# Patient Record
Sex: MALE | Race: BLACK OR AFRICAN AMERICAN | Employment: UNEMPLOYED | URBAN - METROPOLITAN AREA
[De-identification: names, ages, dates, MRNs, and addresses within clinical notes are randomized per-mention and may not be internally consistent; named-entity substitution may affect disease eponyms.]

---

## 2018-10-18 ENCOUNTER — HOSPITAL ENCOUNTER (OUTPATIENT)
Dept: WOUND CARE | Age: 43
Discharge: HOME | End: 2018-10-18
Payer: MEDICARE

## 2018-10-18 VITALS
SYSTOLIC BLOOD PRESSURE: 152 MMHG | HEART RATE: 98 BPM | TEMPERATURE: 97.1 F | OXYGEN SATURATION: 100 % | DIASTOLIC BLOOD PRESSURE: 101 MMHG | RESPIRATION RATE: 18 BRPM

## 2018-10-18 PROBLEM — L97.513 RIGHT FOOT ULCER, WITH NECROSIS OF MUSCLE (HCC): Status: ACTIVE | Noted: 2018-10-18

## 2018-10-18 PROBLEM — E11.42 DIABETIC POLYNEUROPATHY (HCC): Status: ACTIVE | Noted: 2018-10-18

## 2018-10-18 PROBLEM — L97.512 RIGHT FOOT ULCER, WITH FAT LAYER EXPOSED (HCC): Status: ACTIVE | Noted: 2018-10-18

## 2018-10-18 PROCEDURE — 77030011255 HC DSG AQUACEL AG BMS -A: Performed by: PODIATRIST

## 2018-10-18 PROCEDURE — 11042 DBRDMT SUBQ TIS 1ST 20SQCM/<: CPT

## 2018-10-18 PROCEDURE — 77030011256 HC DRSG MEPILEX <16IN NO BORD MOLN -A: Performed by: PODIATRIST

## 2018-10-18 RX ORDER — GABAPENTIN 300 MG/1
300 CAPSULE ORAL 3 TIMES DAILY
COMMUNITY
End: 2019-06-28

## 2018-10-18 NOTE — PROGRESS NOTES
Podiatry Consultation Note    Assessment:       Patient Active Problem List   Diagnosis Code    Right foot ulcer, with necrosis of muscle ([address] L97.513    Diabetic polyneuropathy ([address] E11.42    Right foot ulcer, with fat layer exposed ([address] L97.512       Plan:     Must return to the surgical shoe given Monday to help offload the ulcer lesions. Complete Doxycycline as instructed. Selective excisional debridement with LWC with Aquacel AG and mepilex. Subjective:     I was asked to evaluate [facility] for for diabetic ulcer right foot. He was seen at [facility] yesterday and impressions where taken for diabetic shoes with inserts. He  is a 39 y.o. male admitted on 10/18/2018 for wound care. Allergies   Allergen Reactions    Contrast Agent [Iodine] Rash       Current Outpatient Medications   Medication Sig    gabapentin (NEURONTIN) 300 mg capsule Take 300 mg by mouth three (3) times daily.  amoxicillin/potassium clav (AMOXICILLIN-POT CLAVULANATE PO) Take 1 Tab by mouth two (2) times a day.  insulin aspart (NOVOLOG) 100 unit/mL injection by SubCUTAneous route. Indications: TYPE 2 DIABETES MELLITUS    simvastatin (ZOCOR) 20 mg tablet Take 40 mg by mouth nightly.  aspirin 81 mg chewable tablet Take 81 mg by mouth daily. Indications: MYOCARDIAL INFARCTION PREVENTION     No current facility-administered medications for this encounter. Past Medical History:   Diagnosis Date    Diabetes [facility])      Past Surgical History:   Procedure Laterality Date    HX GI      NEUROLOGICAL PROCEDURE UNLISTED       No family history on file.   Social History     Socioeconomic History    Marital status: SINGLE     Spouse name: Not on file    Number of children: Not on file    Years of education: Not on file    Highest education level: Not on file   Social Needs    Financial resource strain: Not on file    Food insecurity - worry: Not on file    Food insecurity - inability: Not on file   [employer] needs - medical: Not on file   [employer] needs - non-medical: Not on file   Occupational History    Not on file   Tobacco Use    Smoking status: Current Every Day Smoker     Packs/day: 0.25    Smokeless tobacco: Never Used   Substance and Sexual Activity    Alcohol use:  Yes     Alcohol/week: 3.0 oz     Types: 1 Shots of liquor, 4 Glasses of wine per week     Comment: socially    Drug use: Yes     Frequency: 7.0 times per week     Types: Marijuana    Sexual activity: Not on file   Other Topics Concern     Service Not Asked    Blood Transfusions Not Asked    Caffeine Concern Not Asked    Occupational Exposure Not Asked    Hobby Hazards Not Asked    Sleep Concern Not Asked    Stress Concern Not Asked    Weight Concern Not Asked    Special Diet Not Asked    Back Care Not Asked    Exercise Not Asked    Bike Helmet Not Asked   [address] Not Asked    Self-Exams Not Asked   Social History Narrative    Not on file       Physical Exam:  GENERAL: alert, cooperative, no distress, appears stated age    REVIEW OF SYSTEMS:  General: denies chronic fatigue, weight loss, fever, anemia, bruising, depression, nervousness, panic attacks  HEENT: denies ringing in ears, ear infections, dizzy spells, poor vision, glaucoma, sinus trouble, hoarseness, eye infections  GI: denies diarrhea, gas, bloating, heartburn, regurgitation, difficulty swallowing, painful swallowing, nausea, vomiting, constipation, abdominal pain, decreased appetite, blood in stools, black stools, jaundice, dark urine  Lungs: denies pneumonia, asthma, cough, SOB, hemoptysis  Heart: denies chest pain, irregular heart beat, ankle swelling, HTN  Skin: denies rashes, hives, allergic reaction  Urinary: denies UTI, kidney stones, decreased urine force and flow, urination at night, blood in urine, painful urination  Bones and Joints: denies arthritis, rheumatism, back pain, gout, osteoporosis  Neurologic: denies stroke, seizures, headaches, numbness, tingling      Vitals:    10/18/18 1404   BP: (!) 152/101   Pulse: 98   Resp: 18   Temp: 97.1 °F (36.2 °C)   SpO2: 100%       OBJECTIVE:    Patient is alert, well developed, cooperative, pleasant and in no apparent distress. Lower Extremity Exam:     PHYSICAL EXAM: Patient is 39year old male. Pleasant, alert and oriented x3, in no apparent distress, and looks his/her given age. Patient is well-developed and nourished, with good attention to hygiene and body habitus. Mood and affect normal, appropriate to situation. VASCULAR EXAM:. . Pedal pulses are palpable 2/4 DP 2/4 PT right and left foot. Skin temperature is warm to cool right and left foot. Digital capillary fill time is 3 seconds right and left foot. There is no edema of the right and left foot and ankle. NEUROLOGICAL EXAM:. . Sensation is decrease to the right and left foot. . Protective sensation decrease with 5.07g wire Bilateral. Deep tendon reflexes are intact and symmetrical on the right and left foot. MUSCULOSKELETAL EXAM:. . Muscle tone is normal.There is no equinus of the right and left limb. The muscle strength is 5/5 for the flexors, extensors, inverters, and everters. Natalia Figueredo MYCOTIC NAILS:. . Mycosis: Partial (<50%) Bilateral 1 2 3 4 5. Shape: Dystrophic Bilateral 1 2 3 4 5 Color: Yellow Bilateral 1 2 3 4 5. DERMATOLOGICAL EXAM:. . Skin is of abnormal texture and turgor with atrophic skin changes noting decreased hair growth, nail changes (thickening), pigmentary changes, skin texture (thin), Bilateral. No asc lymphagitis noted right  Subhyperkeratotic lesion with tissue breakdown. Noted Grade 2 ulcer. 1.0 centimeters x 0.9 centimeters x 0.5 centimeters with necrotic, nonviable tissue there is no probing to the capsule sub met 5 right. . There is serous exudate with odor noted upon debridement. Subhyperkeratotic lesion with tissue breakdown. Noted Grade 2 ulcer.  1.0 centimeters x 0.5 centimeters x 0.1 centimeters with necrotic, nonviable tissue sub met 1 right. . There is serous exudate noted. No results found for this or any previous visit (from the past 24 hour(s)). Imaging: deferred        EXCISIONAL DEBRIDEMENT NOTE    Selective sharp instrument debridement of slough and devitilized tissue    After the benefits/risks/SE were discussed, the patient agreed to proceed. Time out was done:   * Patient was identified by name and date of birth   * Agreement on procedure being performed was verified   * Procedure site verified and marked as necessary   * Patient was positioned for comfort   * Consent was signed and verified. Site: sub met 5 and sub met 1 right foot    Instruments used:    []  Dermal curette  [x] Blade        [] #15  [x] #10  [] Forceps  [] Tissue nippers  [] sterile scissors  [] Other     Anesthesia:    []  EMLA 2.5% cream: applied to wound beds for approximately 15minutes. []   Lidocaine 2% Topical Gel      []  Lidocaine injectable 1% with epinephrine 1:100,000    []  Lidocaine injectable 1% without epinephrine    []  Other:     [x]  None         [x] patient is insensate due to neuropathy         [] patient declines        [] allergy to anesthetic        [] tissue for debridement is either superficial, loosely adherent and/or necrotic and denervated     After satisfactory anesthesia achieved, the wound/s was/were sharply excisional debrided necrotic, devitalized and granulation tissue down to the sub Q layer, revealing a clean and viable wound bed. Post debridement measurement was1.2_x1.3_x0.2cm sub met 5 right  Post debridement measurement was 0.5_x0.6_x0.1_cm     Bleeding: <5mL Resolved with light focal pressure. Wounds were cleaned and irrigated with saline.      Wound care applied:   []   Hydrogell   []  Hypergel   [x]   Hydrofiber/Aquacel  AG    []   Cadexomer Iodine (Iodosorb)   []  Silver Alginate    []   Medihoney:    []   Collagenase:Santyl   []  Calcium Alginate   []   Collagen: []   Foam   []  Non-Adherent Contact Layer   []   Xeroform    []   Adaptec:   []   Hydrocolloid   []   Transparent Film    []   Promogran   []   Cheri   []   Promogran       []  Antibiotic ointment/cream  []   Wound VAC   []   Other (see below)     Other:     []   Dry Gauze and Roll Gauze    []   Foam and Roll Gauze    []   Dry Gauze    []    Bordered gauze:     []   Secure with Tape    [x]   Other  mepilex    [x]   Compression Wrap:          []   Unna Boot    []Multi-Layer    [x]Tubular Bandages       Chari-Ulcer Care    []   Cream     []   Lotion     []   Ointment     []   Barrier     []   Other:       The patient tolerated the procedure well with no complications. The patient left the exam room in satisfactory and stable condition.        Josue Calix DPM  [facility]  [phone] [facility]  10/18/2018, 2:37 PM

## 2018-10-18 NOTE — WOUND CARE
Patient Name: Soo Schwartz    : 1975    MRN: [id number]      Situation: Referred her by Dr. Angelito Encarnacion of [address] [place] [address]. Background: Diabetic x 23 years. Foot ulcers present x 1 year. He presented today with no dressings, saying the dressings were taken off yesterday when his feet were measured for his diabetic shoes. He states he has been putting a generic form of Vaseline on the ulcers. Assessment: Two diabetic foot ulcers on right plantar surface of foot. See flowsheet below. Plan: After debridement by Dr. Angelito Encarnacion, wounds were dressed with Aquacel Ag and bordered foam.  Supplies ordered from [facility].  He will resume wearing his post-op shoe given to him previously by Dr. Angelito Encarnacion. Patient instructed in dressings. Extra Aquacel AG and bandages given until supplies are received. He will return in 2 weeks for follow up. Offered information on Diabetic Education at [place], but he said he has already been to the classes.         10/18/18 1411   Wound Foot Right;Plantar;Medial   Date First Assessed/Time First Assessed: 10/18/18 1407   POA: Yes  Wound Type: Diabetic  Location: Foot  Orientation: Right;Plantar;Medial   DRESSING TYPE Open to air   Non-Pressure Injury Partial thickness (epider/derm)   Wound Length (cm) 0.6 cm   Wound Width (cm) 0.6 cm   Wound Surface area (cm^2) 0.36 cm^2   Condition of Edges Closed;Calloused   Drainage Amount  None   Wound Odor None   Periwound Skin Condition Calloused   Cleansing and Cleansing Agents  Dermal wound cleanser   Dressing Type Applied Other (Comment)  (Aquacel Ag, Mepilex Border)   Post-Procedure Length (cm) 0.7 cm   Post-Procedure Width (cm) 0.3 cm   Post-Procedure Depth (cm) 0.1 cm   Post-Procedure Volume (cm^3) 0.02 cm^3   Post-Procedure Surface Area (cm^2) 0.21 cm^2   Wound Foot Right;Plantar;Lateral   Date First Assessed/Time First Assessed: 10/18/18 1407   POA: Yes  Wound Type: Diabetic  Location: Foot  Orientation: Right;Plantar;Lateral DRESSING TYPE Open to air   Wound Length (cm) 1 cm   Wound Width (cm) 1.1 cm   Wound Depth (cm) 0.4   Wound Surface area (cm^2) 1.1 cm^2   Condition of Base Pink   Condition of Edges Calloused; Open   Tissue Type Pink   Tissue Type Percent Pink 100   Drainage Amount  (None observe, patient took dressing off yesterday)   Periwound Skin Condition Calloused   Cleansing and Cleansing Agents  Dermal wound cleanser   Dressing Type Applied Other (Comment)  (Aquacel Ag, border foam)   Wound Procedure Type Selective Debridement   Procedure Pain Scale Numeric 0/10   Post-Procedure Length (cm) 1.1 cm   Post-Procedure Width (cm) 1.4 cm   Post-Procedure Depth (cm) 0.4 cm   Post-Procedure Volume (cm^3) 0.62 cm^3   Post-Procedure Surface Area (cm^2) 1.54 cm^2   Post Procedure Procedure Pain Scale Numeric 0/10

## 2018-11-01 ENCOUNTER — HOSPITAL ENCOUNTER (OUTPATIENT)
Dept: WOUND CARE | Age: 43
Discharge: HOME | End: 2018-11-01
Payer: MEDICARE

## 2018-11-01 VITALS
SYSTOLIC BLOOD PRESSURE: 144 MMHG | RESPIRATION RATE: 16 BRPM | OXYGEN SATURATION: 100 % | HEART RATE: 101 BPM | TEMPERATURE: 97.2 F | DIASTOLIC BLOOD PRESSURE: 101 MMHG

## 2018-11-01 PROCEDURE — 77030011256 HC DRSG MEPILEX <16IN NO BORD MOLN -A: Performed by: PODIATRIST

## 2018-11-01 PROCEDURE — 77030011255 HC DSG AQUACEL AG BMS -A: Performed by: PODIATRIST

## 2018-11-01 PROCEDURE — 11042 DBRDMT SUBQ TIS 1ST 20SQCM/<: CPT

## 2018-11-01 RX ORDER — INSULIN GLARGINE 100 [IU]/ML
INJECTION, SOLUTION SUBCUTANEOUS
COMMUNITY
End: 2019-05-29

## 2018-11-01 NOTE — WOUND CARE
Patient seen today for follow up of diabetic ulcers on his right foot. He states he feels they are getting better. The medial plantar ulcer is now healed and calloused over. The lateral ulcer is still open, but the base is pink and there are no signs of further breakdown. His ulcers were debrided today. He was given foam dressings to use for off-loading. Moleskin was placed in his surgical shoe and cut to off-load the lateral ulcer. Patient's BP was reading high today. It was also elevated at his last appointment. He states he has seen his PCP in the meantime, but she did not address it. He will call her office for an appointment. He will follow up here in the wound clinic in two weeks.

## 2018-11-01 NOTE — PROGRESS NOTES
Progress and Debridement Note    Patient: Yahaira Lerma MRN: [id number]  SSN: [id number]    YOB: 1975  Age: 39 y.o. Sex: male      Assessment:     Active Problems:    Right foot ulcer, with necrosis of muscle ([address] (10/18/2018)      Diabetic polyneuropathy ([address] (10/18/2018)      Right foot ulcer, with fat layer exposed ([address] (10/18/2018)        Plan:      Selective excisional debridement with [address] with Aquacel AG and mepilex. Modified surgical shoe to offload sub met 5. Patient to check on status of diabetic shoe gear. Continue current treatment        Subjective:     40 y/o diabetic male present to the wound clinic for diabetic ulcer right foot. Patient state he has been using the surgical a.but has not hear anything form [facility] regarding his diabetic shoe gear.     Objective:     Patient Vitals for the past 24 hrs:   Temp Pulse Resp BP SpO2   11/01/18 1442 97.2 °F (36.2 °C) (!) 101 16 (!) 144/101 100 %       Physical Exam:     General Exam  alert, cooperative, no distress, appears stated age    REVIEW OF SYSTEMS:  General: denies chronic fatigue, weight loss, fever, anemia, bruising, depression, nervousness, panic attacks  HEENT: denies ringing in ears, ear infections, dizzy spells, poor vision, glaucoma, sinus trouble, hoarseness, eye infections  GI: denies diarrhea, gas, bloating, heartburn, regurgitation, difficulty swallowing, painful swallowing, nausea, vomiting, constipation, abdominal pain, decreased appetite, blood in stools, black stools, jaundice, dark urine  Lungs: denies pneumonia, asthma, cough, SOB, hemoptysis  Heart: denies chest pain, irregular heart beat, ankle swelling, HTN  Skin: denies rashes, hives, allergic reaction  Urinary: denies UTI, kidney stones, decreased urine force and flow, urination at night, blood in urine, painful urination  Bones and Joints: denies arthritis, rheumatism, back pain, gout, osteoporosis  Neurologic: denies stroke, seizures, headaches, numbness, tingling    PHYSICAL EXAM: Patient is 39year old male. Pleasant, alert and oriented x3, in no apparent distress, and looks his/her given age. Patient is well-developed and nourished, with good attention to hygiene and body habitus. Mood and affect normal, appropriate to situation. VASCULAR EXAM:. . Pedal pulses are palpable 2/4 DP 2/4 PT right and left foot. Skin temperature is warm to cool right and left foot. Digital capillary fill time is 3 seconds right and left foot. There is no edema of the right and left foot and ankle. NEUROLOGICAL EXAM:. . Sensation is decrease to the right and left foot. . Protective sensation decrease with 5.07g wire Bilateral. Deep tendon reflexes are intact and symmetrical on the right and left foot. MUSCULOSKELETAL EXAM:. . Muscle tone is normal.There is no equinus of the right and left limb. The muscle strength is 5/5 for the flexors, extensors, inverters, and everters. Natalia Figueredo MYCOTIC NAILS:. . Mycosis: Partial (<50%) Bilateral 1 2 3 4 5. Shape: Dystrophic Bilateral 1 2 3 4 5 Color: Yellow Bilateral 1 2 3 4 5. DERMATOLOGICAL EXAM:. . Skin is of abnormal texture and turgor with atrophic skin changes noting decreased hair growth, nail changes (thickening), pigmentary changes, skin texture (thin), Bilateral. No asc lymphagitis noted right      Subhyperkeratotic lesion with tissue breakdown. Noted Grade 2 ulcer. 1.0 centimeters x 0.5 centimeters x 0.4 centimeters with necrotic, nonviable tissue there is no probing to the capsule sub met 5 right. . There is serous exudate with odor noted upon debridement  . Subhyperkeratotic lesion with tissue breakdown. Noted Grade 2Resolved ulcer.  There is complete epithelialization noted sub met 1 right.            Wound Foot Right;Plantar;Medial (Active)   DRESSING TYPE Open to air 11/1/2018  2:40 PM   Non-Pressure Injury Partial thickness (epider/derm) 10/18/2018  2:11 PM   Wound Length (cm) 0 cm 11/1/2018  2:40 PM   Wound Width (cm) 0.6 cm 10/18/2018  2:11 PM   Wound Surface area (cm^2) 0.36 cm^2 10/18/2018  2:11 PM   Condition of Edges Calloused 11/1/2018  2:40 PM   Drainage Amount  None 11/1/2018  2:40 PM   Wound Odor None 11/1/2018  2:40 PM   Periwound Skin Condition Calloused 11/1/2018  2:40 PM   Cleansing and Cleansing Agents  Dermal wound cleanser 11/1/2018  2:40 PM   Dressing Type Applied Other (Comment) 10/18/2018  2:11 PM   Post-Procedure Length (cm) 0.7 cm 10/18/2018  2:11 PM   Post-Procedure Width (cm) 0.3 cm 10/18/2018  2:11 PM   Post-Procedure Depth (cm) 0.1 cm 10/18/2018  2:11 PM   Post-Procedure Volume (cm^3) 0.02 cm^3 10/18/2018  2:11 PM   Post-Procedure Surface Area (cm^2) 0.21 cm^2 10/18/2018  2:11 PM   Number of days: 14       Wound Foot Right;Plantar;Lateral (Active)   DRESSING STATUS Removed 11/1/2018  2:40 PM   DRESSING TYPE Other (Comment) 11/1/2018  2:40 PM   Wound Length (cm) 1 cm 11/1/2018  2:40 PM   Wound Width (cm) 0.6 cm 11/1/2018  2:40 PM   Wound Depth (cm) 0.4 11/1/2018  2:40 PM   Wound Surface area (cm^2) 0.6 cm^2 11/1/2018  2:40 PM   Change in Wound Size % 45.45 11/1/2018  2:40 PM   Condition of Base [place] 11/1/2018  2:40 PM   Condition of Edges Calloused 11/1/2018  2:40 PM   Tissue Type Pink 11/1/2018  2:40 PM   Tissue Type Percent Pink 100 11/1/2018  2:40 PM   Drainage Amount  Moderate 11/1/2018  2:40 PM   Drainage Color Serosanguinous 11/1/2018  2:40 PM   Periwound Skin Condition Macerated 11/1/2018  2:40 PM   Cleansing and Cleansing Agents  Dermal wound cleanser 11/1/2018  2:40 PM   Dressing Type Applied Other (Comment) 10/18/2018  2:11 PM   Wound Procedure Type Selective Debridement 10/18/2018  2:11 PM   Procedure Pain Scale Numeric 0/10 10/18/2018  2:11 PM   Post-Procedure Length (cm) 1.1 cm 10/18/2018  2:11 PM   Post-Procedure Width (cm) 1.4 cm 10/18/2018  2:11 PM   Post-Procedure Depth (cm) 0.4 cm 10/18/2018  2:11 PM   Post-Procedure Volume (cm^3) 0.62 cm^3 10/18/2018  2:11 PM   Post-Procedure Surface Area (cm^2) 1.54 cm^2 10/18/2018  2:11 PM   Post Procedure Procedure Pain Scale Numeric 0/10 10/18/2018  2:11 PM   Number of days: 14          Lab/Data Review:  No results found for this or any previous visit (from the past 24 hour(s)). none      PROCEDURE    Selective sharp instrument excisional debridement of slough and devitilized tissue    After the benefits/risks/SE were discussed, the patient agreed to proceed. Time out was done:   * Patient was identified by name and date of birth   * Agreement on procedure being performed was verified   * Procedure site verified and marked as necessary   * Patient was positioned for comfort   * Consent was signed and verified. Site: sub met 5 right    Instruments used:    []  Dermal curette  [x] Blade        [] #15  [x] #10  [] Forceps  [] Tissue nippers  [] sterile scissors  [] Other     Anesthesia:    []  EMLA 2.5% cream: applied to wound beds for approximately 15minutes. []   Lidocaine 2% Topical Gel      []  Lidocaine injectable 1% with epinephrine 1:100,000    []  Lidocaine injectable 1% without epinephrine    []  Other:     [x]  None         [x] patient is insensate due to neuropathy         [] patient declines        [] allergy to anesthetic        [] tissue for debridement is either superficial, loosely adherent and/or necrotic and denervated     After satisfactory anesthesia achieved, the wound/s was/were sharply debrided necrotic, devitalized and granulation tissue down to the sub Q layer, revealing a clean and viable wound bed. Post debridement measurement was 1.0_x_0.8_x0.3cm . Bleeding: <5mL Resolved with light focal pressure. Wounds were cleaned and irrigated with saline.      Wound care applied:   []   Hydrogell   []  Hypergel   [x]   Hydrofiber/Aquacel AG      []   Cadexomer Iodine (Iodosorb)   []  Silver Alginate    []   Medihoney:    []   Collagenase:Santyl   []  Calcium Alginate   []   Collagen:    []   Foam   []  Non-Adherent Contact Layer   []   Xeroform    []   Adaptec:   []   Hydrocolloid   []   Transparent Film    []  Antibiotic ointment/cream     []   Other (see below)     Other:     []   Dry Gauze and Roll Gauze    []   Foam and Roll Gauze    []   Dry Gauze    []   Bordered gauze:     []   Secure with Tape    [x]   Bordered foam       []   Other      []   Compression Wrap:          []   Unna Boot    []Multi-Layer    []Tubular Bandages       Chari-Ulcer Care    []   Cream     []   Lotion     []   Ointment     []   Barrier     []   Other:       The patient tolerated the procedure well with no complications. The patient left the exam room in satisfactory and stable condition.      Signed By: Jeramy Whiting DPM     November 1, 2018 3:22 PM

## 2018-11-15 ENCOUNTER — HOSPITAL ENCOUNTER (OUTPATIENT)
Dept: WOUND CARE | Age: 43
Discharge: HOME | End: 2018-11-15
Payer: MEDICARE

## 2018-11-15 VITALS
DIASTOLIC BLOOD PRESSURE: 97 MMHG | OXYGEN SATURATION: 98 % | HEART RATE: 97 BPM | SYSTOLIC BLOOD PRESSURE: 140 MMHG | RESPIRATION RATE: 15 BRPM | TEMPERATURE: 98.4 F

## 2018-11-15 PROCEDURE — 77030011256 HC DRSG MEPILEX <16IN NO BORD MOLN -A: Performed by: PODIATRIST

## 2018-11-15 PROCEDURE — 77030022017 HC DRSG HEMO QCLOT ZMED -A: Performed by: PODIATRIST

## 2018-11-15 PROCEDURE — 11042 DBRDMT SUBQ TIS 1ST 20SQCM/<: CPT

## 2018-11-15 RX ORDER — DOXYCYCLINE 100 MG/1
100 CAPSULE ORAL 2 TIMES DAILY
Qty: 20 CAP | Refills: 0 | Status: SHIPPED | OUTPATIENT
Start: 2018-11-15 | End: 2019-05-29

## 2018-11-15 NOTE — PROGRESS NOTES
Progress and Debridement Note    Patient: Bishop Lacey MRN: [id number]  SSN: [id number]    YOB: 1975  Age: 39 y.o. Sex: male      Assessment:     Active Problems:    Right foot ulcer, with necrosis of muscle ([facility] [address] (10/18/2018)      Diabetic polyneuropathy ([address] (10/18/2018)      Right foot ulcer, with fat layer exposed ([address] (10/18/2018)        Plan:    Selective excisional debridement with continued LWC with Aquacel AG, offloading plain foam and surgical shoe right foot. Check on status of diabetic shoe gear. eRx  Doxycycline 100mg BID x 10 days        Subjective:     38 y/o diabetic male present for chronic diabetic ulcer right foot. He states he is not sure on his blood sugar. He states he has not received his diabetic shoes.     Objective:     Patient Vitals for the past 24 hrs:   Temp Pulse Resp BP SpO2   11/15/18 1455 98.4 °F (36.9 °C) 97 15 (!) 140/97 98 %       Physical Exam:     General Exam  alert, cooperative, no distress, appears stated age    REVIEW OF SYSTEMS:  General: denies chronic fatigue, weight loss, fever, anemia, bruising, depression, nervousness, panic attacks  HEENT: denies ringing in ears, ear infections, dizzy spells, poor vision, glaucoma, sinus trouble, hoarseness, eye infections  GI: denies diarrhea, gas, bloating, heartburn, regurgitation, difficulty swallowing, painful swallowing, nausea, vomiting, constipation, abdominal pain, decreased appetite, blood in stools, black stools, jaundice, dark urine  Lungs: denies pneumonia, asthma, cough, SOB, hemoptysis  Heart: denies chest pain, irregular heart beat, ankle swelling, HTN  Skin: denies rashes, hives, allergic reaction  Urinary: denies UTI, kidney stones, decreased urine force and flow, urination at night, blood in urine, painful urination  Bones and Joints: denies arthritis, rheumatism, back pain, gout, osteoporosis  Neurologic: denies stroke, seizures, headaches, numbness, tingling    PHYSICAL EXAM: Patient is 39year old male. Pleasant, alert and oriented x3, in no apparent distress, and looks his/her given age. Patient is well-developed and nourished, with good attention to hygiene and body habitus. Mood and affect normal, appropriate to situation. VASCULAR EXAM:. . Pedal pulses are palpable 2/4 DP 2/4 PT right and left foot. Skin temperature is warm to cool right and left foot. Digital capillary fill time is 3 seconds right and left foot. There is no edema of the right and left foot and ankle. NEUROLOGICAL EXAM:. . Sensation is decrease to the right and left foot. . Protective sensation decrease with 5.07g wire Bilateral. Deep tendon reflexes are intact and symmetrical on the right and left foot. MUSCULOSKELETAL EXAM:. . Muscle tone is normal.There is no equinus of the right and left limb. The muscle strength is 5/5 for the flexors, extensors, inverters, and everters. .     DERMATOLOGICAL EXAM:. . Skin is of abnormal texture and turgor with atrophic skin changes noting decreased hair growth, nail changes (thickening), pigmentary changes, skin texture (thin), Bilateral. No asc lymphagitis noted right        Subhyperkeratotic lesion with tissue breakdown. Noted Grade 2 ulcer. 0.5 centimeters x 0.3 centimeters x 0.3 centimeters with necrotic, nonviable tissue there is no probing to the capsule sub met 5 right. . There is serous exudate with odor noted upon debridement        Wound Foot Right;Plantar;Medial (Active)   DRESSING STATUS Removed 11/15/2018  2:48 PM   DRESSING TYPE Other (Comment) 11/15/2018  2:48 PM   Non-Pressure Injury Partial thickness (epider/derm) 10/18/2018  2:11 PM   Wound Length (cm) 0 cm 11/15/2018  2:48 PM   Wound Width (cm) 0.6 cm 10/18/2018  2:11 PM   Wound Surface area (cm^2) 0.36 cm^2 10/18/2018  2:11 PM   Condition of Edges Closed 11/15/2018  2:48 PM   Drainage Amount  None 11/15/2018  2:48 PM   Wound Odor None 11/1/2018  2:40 PM   Periwound Skin Condition Calloused 11/1/2018  2:40 PM   Cleansing and Cleansing Agents  Dermal wound cleanser 11/15/2018  2:48 PM   Dressing Type Applied Other (Comment) 11/1/2018  2:40 PM   Procedure Time Out 9234 11/1/2018  2:40 PM   Consent Obtained  Yes 11/1/2018  2:40 PM   Procedure Bleeding Yes 11/1/2018  2:40 PM   Post-Procedure Length (cm) 0 cm 11/1/2018  2:40 PM   Post-Procedure Width (cm) 0.3 cm 10/18/2018  2:11 PM   Post-Procedure Depth (cm) 0.1 cm 10/18/2018  2:11 PM   Post-Procedure Volume (cm^3) 0.02 cm^3 10/18/2018  2:11 PM   Post-Procedure Surface Area (cm^2) 0.21 cm^2 10/18/2018  2:11 PM   Post Procedure Procedure Pain Scale Numeric 0/10 11/1/2018  2:40 PM   Assisted Physcian in procedure  Yes 11/1/2018  2:40 PM   Procedure Tolerated Well 11/1/2018  2:40 PM   Number of days: 28       Wound Foot Right;Plantar;Lateral (Active)   DRESSING STATUS Removed 11/15/2018  2:48 PM   DRESSING TYPE Other (Comment) 11/15/2018  2:48 PM   Wound Length (cm) 0.5 cm 11/15/2018  2:48 PM   Wound Width (cm) 0.3 cm 11/15/2018  2:48 PM   Wound Depth (cm) 0.3 11/15/2018  2:48 PM   Wound Surface area (cm^2) 0.15 cm^2 11/15/2018  2:48 PM   Change in Wound Size % 86.36 11/15/2018  2:48 PM   Condition of Base Larned 11/15/2018  2:48 PM   Condition of Edges Calloused 11/15/2018  2:48 PM   Tissue Type Pink 11/15/2018  2:48 PM   Tissue Type Percent Pink 100 11/15/2018  2:48 PM   Drainage Amount  Small  11/15/2018  2:48 PM   Drainage Color Serosanguinous 11/15/2018  2:48 PM   Wound Odor None 11/15/2018  2:48 PM   Periwound Skin Condition Macerated 11/1/2018  2:40 PM   Cleansing and Cleansing Agents  Dermal wound cleanser 11/15/2018  2:48 PM   Dressing Type Applied Other (Comment) 11/1/2018  2:40 PM   Wound Procedure Type Selective Debridement 10/18/2018  2:11 PM   Procedure Time Out 1510 11/15/2018  3:10 PM   Consent Obtained  Yes 11/15/2018  3:10 PM   Procedure Anesthia  no 11/15/2018  3:10 PM   Procedure Instrument 15 blade 11/15/2018  3:10 PM   Procedure Pain Scale Numeric 0/10 11/15/2018  3:10 PM   Post-Procedure Length (cm) 0.8 cm 11/15/2018  3:12 PM   Post-Procedure Width (cm) 0.9 cm 11/15/2018  3:12 PM   Post-Procedure Depth (cm) 0.4 cm 11/15/2018  3:12 PM   Post-Procedure Volume (cm^3) 0.29 cm^3 11/15/2018  3:12 PM   Post-Procedure Surface Area (cm^2) 0.72 cm^2 11/15/2018  3:12 PM   Post Procedure Procedure Pain Scale Numeric 0/10 10/18/2018  2:11 PM   Number of days: 28          Lab/Data Review:  No results found for this or any previous visit (from the past 24 hour(s)). none      PROCEDURE    Selective sharp instrument excisional debridement of slough and devitilized tissue    After the benefits/risks/SE were discussed, the patient agreed to proceed. Time out was done:   * Patient was identified by name and date of birth   * Agreement on procedure being performed was verified   * Procedure site verified and marked as necessary   * Patient was positioned for comfort   * Consent was signed and verified. Site: sub met 5 right    Instruments used:    []  Dermal curette  [x] Blade        [] #15  [x] #10  [] Forceps  [] Tissue nippers  [] sterile scissors  [] Other     Anesthesia:    []  EMLA 2.5% cream: applied to wound beds for approximately 15minutes. []   Lidocaine 2% Topical Gel      []  Lidocaine injectable 1% with epinephrine 1:100,000    []  Lidocaine injectable 1% without epinephrine    []  Other:     [x]  None         [x] patient is insensate due to neuropathy         [] patient declines        [] allergy to anesthetic        [] tissue for debridement is either superficial, loosely adherent and/or necrotic and denervated     After satisfactory anesthesia achieved, the wound/s was/were sharply debrided necrotic, devitalized and granulation tissue down to the muscle layer, revealing a clean and viable wound bed. Post debridement measurement was 0.8x_0.9_x0.4_cm . Bleeding: <5mL Resolved with light focal pressure.      Wounds were cleaned and irrigated with saline. Wound care applied:   []   Hydrogell   []  Hypergel   [x]   Hydrofiber/Aquacel AG     []   Cadexomer Iodine (Iodosorb)   []  Silver Alginate    []   Medihoney:    []   Collagenase:Santyl   []  Calcium Alginate   []   Collagen:    [x]   Foam   []  Non-Adherent Contact Layer   []   Xeroform    []   Adaptec:   []   Hydrocolloid   []   Transparent Film    []  Antibiotic ointment/cream     []   Other (see below)     Other:     []   Dry Gauze and Roll Gauze    []   Foam and Roll Gauze    []   Dry Gauze    []   Bordered gauze:     []   Secure with Tape    [x]   Bordered foam offloading foam       []   Other      []   Compression Wrap:          []   Unna Boot    []Multi-Layer    []Tubular Bandages       Chari-Ulcer Care    []   Cream     []   Lotion     []   Ointment     []   Barrier     []   Other:       The patient tolerated the procedure well with no complications. The patient left the exam room in satisfactory and stable condition.      Signed By: Lorena Irene DPM     November 15, 2018 3:18 PM

## 2018-11-15 NOTE — WOUND CARE
Medial ulcer healed. Patient educated on importance of maintaining his blood sugars within a healthy limit. Debridement completed by Dr Shiva Howe. Aquacel AG, plain foam for off-loading used and secured with Hypafix. Patient educated on dressing application and supplies provided, patient to complete dressing change 3x weekly. Started on Doxycycline 100mg BID x 10 days.

## 2019-05-29 ENCOUNTER — HOSPITAL ENCOUNTER (INPATIENT)
Age: 44
LOS: 8 days | Discharge: NURSING, REHAB OR LONG-TERM CARE | DRG: 854 | End: 2019-06-06
Attending: EMERGENCY MEDICINE | Admitting: INTERNAL MEDICINE
Payer: MEDICARE

## 2019-05-29 ENCOUNTER — APPOINTMENT (OUTPATIENT)
Dept: GENERAL RADIOLOGY | Age: 44
DRG: 854 | End: 2019-05-29
Attending: PHYSICIAN ASSISTANT
Payer: MEDICARE

## 2019-05-29 DIAGNOSIS — L97.513 RIGHT FOOT ULCER, WITH NECROSIS OF MUSCLE (HCC): Primary | ICD-10-CM

## 2019-05-29 DIAGNOSIS — M86.9 OSTEOMYELITIS OF RIGHT FOOT, UNSPECIFIED TYPE (HCC): ICD-10-CM

## 2019-05-29 PROBLEM — R29.898 LEFT ARM WEAKNESS: Status: ACTIVE | Noted: 2019-05-29

## 2019-05-29 PROBLEM — A41.9 SEPSIS (HCC): Status: ACTIVE | Noted: 2019-05-29

## 2019-05-29 LAB
ALBUMIN SERPL-MCNC: 4 G/DL (ref 3.4–5)
ALBUMIN/GLOB SERPL: 0.8 {RATIO} (ref 0.8–1.7)
ALP SERPL-CCNC: 78 U/L (ref 45–117)
ALT SERPL-CCNC: 24 U/L (ref 16–61)
ANION GAP BLD CALC-SCNC: 17 MMOL/L (ref 10–20)
ANION GAP SERPL CALC-SCNC: 9 MMOL/L (ref 3–18)
APPEARANCE UR: CLEAR
AST SERPL-CCNC: 26 U/L (ref 15–37)
ATRIAL RATE: 96 BPM
BASOPHILS # BLD: 0 K/UL (ref 0–0.1)
BASOPHILS NFR BLD: 1 % (ref 0–2)
BILIRUB SERPL-MCNC: 0.5 MG/DL (ref 0.2–1)
BILIRUB UR QL: NEGATIVE
BUN BLD-MCNC: 22 MG/DL (ref 7–18)
BUN SERPL-MCNC: 20 MG/DL (ref 7–18)
BUN/CREAT SERPL: 11 (ref 12–20)
CA-I BLD-MCNC: 1.29 MMOL/L (ref 1.12–1.32)
CALCIUM SERPL-MCNC: 10.4 MG/DL (ref 8.5–10.1)
CALCULATED P AXIS, ECG09: 73 DEGREES
CALCULATED R AXIS, ECG10: 56 DEGREES
CALCULATED T AXIS, ECG11: 74 DEGREES
CHLORIDE BLD-SCNC: 99 MMOL/L (ref 100–108)
CHLORIDE SERPL-SCNC: 99 MMOL/L (ref 100–108)
CO2 BLD-SCNC: 29 MMOL/L (ref 19–24)
CO2 SERPL-SCNC: 31 MMOL/L (ref 21–32)
COLOR UR: YELLOW
CREAT SERPL-MCNC: 1.83 MG/DL (ref 0.6–1.3)
CREAT UR-MCNC: 1.6 MG/DL (ref 0.6–1.3)
DIAGNOSIS, 93000: NORMAL
DIFFERENTIAL METHOD BLD: ABNORMAL
EOSINOPHIL # BLD: 0.1 K/UL (ref 0–0.4)
EOSINOPHIL NFR BLD: 1 % (ref 0–5)
ERYTHROCYTE [DISTWIDTH] IN BLOOD BY AUTOMATED COUNT: 12.4 % (ref 11.6–14.5)
EST. AVERAGE GLUCOSE BLD GHB EST-MCNC: 197 MG/DL
GLOBULIN SER CALC-MCNC: 4.9 G/DL (ref 2–4)
GLUCOSE BLD STRIP.AUTO-MCNC: 231 MG/DL (ref 70–110)
GLUCOSE BLD STRIP.AUTO-MCNC: 55 MG/DL (ref 70–110)
GLUCOSE BLD STRIP.AUTO-MCNC: 60 MG/DL (ref 70–110)
GLUCOSE BLD STRIP.AUTO-MCNC: 75 MG/DL (ref 74–106)
GLUCOSE BLD STRIP.AUTO-MCNC: 86 MG/DL (ref 70–110)
GLUCOSE SERPL-MCNC: 77 MG/DL (ref 74–99)
GLUCOSE UR STRIP.AUTO-MCNC: >1000 MG/DL
HBA1C MFR BLD: 8.5 % (ref 4.2–5.6)
HCT VFR BLD AUTO: 36.7 % (ref 36–48)
HCT VFR BLD CALC: 39 % (ref 36–49)
HGB BLD-MCNC: 11.6 G/DL (ref 13–16)
HGB BLD-MCNC: 13.3 G/DL (ref 12–16)
HGB UR QL STRIP: NEGATIVE
KETONES UR QL STRIP.AUTO: 15 MG/DL
LACTATE BLD-SCNC: 1.25 MMOL/L (ref 0.4–2)
LACTATE BLD-SCNC: 3.16 MMOL/L (ref 0.4–2)
LEUKOCYTE ESTERASE UR QL STRIP.AUTO: NEGATIVE
LYMPHOCYTES # BLD: 1.7 K/UL (ref 0.9–3.6)
LYMPHOCYTES NFR BLD: 33 % (ref 21–52)
MCH RBC QN AUTO: 28.9 PG (ref 24–34)
MCHC RBC AUTO-ENTMCNC: 31.6 G/DL (ref 31–37)
MCV RBC AUTO: 91.5 FL (ref 74–97)
MONOCYTES # BLD: 0.3 K/UL (ref 0.05–1.2)
MONOCYTES NFR BLD: 5 % (ref 3–10)
NEUTS SEG # BLD: 3.2 K/UL (ref 1.8–8)
NEUTS SEG NFR BLD: 60 % (ref 40–73)
NITRITE UR QL STRIP.AUTO: NEGATIVE
P-R INTERVAL, ECG05: 138 MS
PH UR STRIP: 5.5 [PH] (ref 5–8)
PLATELET # BLD AUTO: 459 K/UL (ref 135–420)
PMV BLD AUTO: 9.9 FL (ref 9.2–11.8)
POTASSIUM BLD-SCNC: 3.7 MMOL/L (ref 3.5–5.5)
POTASSIUM SERPL-SCNC: 3.8 MMOL/L (ref 3.5–5.5)
PROT SERPL-MCNC: 8.9 G/DL (ref 6.4–8.2)
PROT UR STRIP-MCNC: NEGATIVE MG/DL
Q-T INTERVAL, ECG07: 368 MS
QRS DURATION, ECG06: 84 MS
QTC CALCULATION (BEZET), ECG08: 464 MS
RBC # BLD AUTO: 4.01 M/UL (ref 4.7–5.5)
SODIUM BLD-SCNC: 140 MMOL/L (ref 136–145)
SODIUM SERPL-SCNC: 139 MMOL/L (ref 136–145)
SP GR UR REFRACTOMETRY: >1.03 (ref 1–1.03)
UROBILINOGEN UR QL STRIP.AUTO: 1 EU/DL (ref 0.2–1)
VENTRICULAR RATE, ECG03: 96 BPM
WBC # BLD AUTO: 5.2 K/UL (ref 4.6–13.2)

## 2019-05-29 PROCEDURE — 74011000258 HC RX REV CODE- 258: Performed by: INTERNAL MEDICINE

## 2019-05-29 PROCEDURE — 81003 URINALYSIS AUTO W/O SCOPE: CPT

## 2019-05-29 PROCEDURE — 87077 CULTURE AEROBIC IDENTIFY: CPT

## 2019-05-29 PROCEDURE — 74011000258 HC RX REV CODE- 258: Performed by: PHYSICIAN ASSISTANT

## 2019-05-29 PROCEDURE — 85025 COMPLETE CBC W/AUTO DIFF WBC: CPT

## 2019-05-29 PROCEDURE — 96375 TX/PRO/DX INJ NEW DRUG ADDON: CPT

## 2019-05-29 PROCEDURE — 74011250636 HC RX REV CODE- 250/636: Performed by: INTERNAL MEDICINE

## 2019-05-29 PROCEDURE — 74011250636 HC RX REV CODE- 250/636: Performed by: PHYSICIAN ASSISTANT

## 2019-05-29 PROCEDURE — 80047 BASIC METABLC PNL IONIZED CA: CPT

## 2019-05-29 PROCEDURE — 87040 BLOOD CULTURE FOR BACTERIA: CPT

## 2019-05-29 PROCEDURE — 77030021352 HC CBL LD SYS DISP COVD -B

## 2019-05-29 PROCEDURE — 93005 ELECTROCARDIOGRAM TRACING: CPT

## 2019-05-29 PROCEDURE — 83605 ASSAY OF LACTIC ACID: CPT

## 2019-05-29 PROCEDURE — 99284 EMERGENCY DEPT VISIT MOD MDM: CPT

## 2019-05-29 PROCEDURE — 96365 THER/PROPH/DIAG IV INF INIT: CPT

## 2019-05-29 PROCEDURE — 65660000000 HC RM CCU STEPDOWN

## 2019-05-29 PROCEDURE — 82962 GLUCOSE BLOOD TEST: CPT

## 2019-05-29 PROCEDURE — 83036 HEMOGLOBIN GLYCOSYLATED A1C: CPT

## 2019-05-29 PROCEDURE — 74011636637 HC RX REV CODE- 636/637: Performed by: INTERNAL MEDICINE

## 2019-05-29 PROCEDURE — 77030037870 HC GLD SHT PREVALON SAGE -B

## 2019-05-29 PROCEDURE — 80053 COMPREHEN METABOLIC PANEL: CPT

## 2019-05-29 PROCEDURE — 74011250637 HC RX REV CODE- 250/637: Performed by: INTERNAL MEDICINE

## 2019-05-29 PROCEDURE — 73630 X-RAY EXAM OF FOOT: CPT

## 2019-05-29 PROCEDURE — 71045 X-RAY EXAM CHEST 1 VIEW: CPT

## 2019-05-29 RX ORDER — HEPARIN SODIUM 5000 [USP'U]/ML
5000 INJECTION, SOLUTION INTRAVENOUS; SUBCUTANEOUS EVERY 8 HOURS
Status: DISCONTINUED | OUTPATIENT
Start: 2019-05-29 | End: 2019-06-06 | Stop reason: HOSPADM

## 2019-05-29 RX ORDER — CALCIUM CARBONATE/VITAMIN D3 600MG-5MCG
1 TABLET ORAL DAILY
Status: DISCONTINUED | OUTPATIENT
Start: 2019-05-30 | End: 2019-06-06 | Stop reason: HOSPADM

## 2019-05-29 RX ORDER — DEXTROSE MONOHYDRATE 25 G/50ML
25-50 INJECTION, SOLUTION INTRAVENOUS AS NEEDED
Status: DISCONTINUED | OUTPATIENT
Start: 2019-05-29 | End: 2019-05-31 | Stop reason: SDUPTHER

## 2019-05-29 RX ORDER — SODIUM CHLORIDE 0.9 % (FLUSH) 0.9 %
5-10 SYRINGE (ML) INJECTION AS NEEDED
Status: DISCONTINUED | OUTPATIENT
Start: 2019-05-29 | End: 2019-06-06 | Stop reason: HOSPADM

## 2019-05-29 RX ORDER — INSULIN GLARGINE 100 [IU]/ML
10 INJECTION, SOLUTION SUBCUTANEOUS DAILY
Status: DISCONTINUED | OUTPATIENT
Start: 2019-05-30 | End: 2019-05-31

## 2019-05-29 RX ORDER — GABAPENTIN 100 MG/1
300 CAPSULE ORAL 3 TIMES DAILY
Status: DISCONTINUED | OUTPATIENT
Start: 2019-05-29 | End: 2019-06-06 | Stop reason: HOSPADM

## 2019-05-29 RX ORDER — GUAIFENESIN 100 MG/5ML
81 LIQUID (ML) ORAL DAILY
Status: DISCONTINUED | OUTPATIENT
Start: 2019-05-30 | End: 2019-06-06 | Stop reason: HOSPADM

## 2019-05-29 RX ORDER — INSULIN GLARGINE 100 [IU]/ML
10 INJECTION, SOLUTION SUBCUTANEOUS
Status: DISCONTINUED | OUTPATIENT
Start: 2019-05-30 | End: 2019-05-29

## 2019-05-29 RX ORDER — VANCOMYCIN/0.9 % SOD CHLORIDE 1.5G/250ML
1500 PLASTIC BAG, INJECTION (ML) INTRAVENOUS ONCE
Status: COMPLETED | OUTPATIENT
Start: 2019-05-29 | End: 2019-05-29

## 2019-05-29 RX ORDER — MAGNESIUM SULFATE 100 %
4 CRYSTALS MISCELLANEOUS AS NEEDED
Status: DISCONTINUED | OUTPATIENT
Start: 2019-05-29 | End: 2019-06-06 | Stop reason: HOSPADM

## 2019-05-29 RX ORDER — INSULIN LISPRO 100 [IU]/ML
INJECTION, SOLUTION INTRAVENOUS; SUBCUTANEOUS
Status: DISCONTINUED | OUTPATIENT
Start: 2019-05-29 | End: 2019-06-05

## 2019-05-29 RX ORDER — SODIUM CHLORIDE 450 MG/100ML
75 INJECTION, SOLUTION INTRAVENOUS CONTINUOUS
Status: DISCONTINUED | OUTPATIENT
Start: 2019-05-29 | End: 2019-06-06 | Stop reason: HOSPADM

## 2019-05-29 RX ORDER — INSULIN GLARGINE 100 [IU]/ML
28 INJECTION, SOLUTION SUBCUTANEOUS
Status: DISCONTINUED | OUTPATIENT
Start: 2019-05-29 | End: 2019-05-29

## 2019-05-29 RX ORDER — SIMVASTATIN 40 MG/1
40 TABLET, FILM COATED ORAL
Status: DISCONTINUED | OUTPATIENT
Start: 2019-05-29 | End: 2019-06-06 | Stop reason: HOSPADM

## 2019-05-29 RX ORDER — INSULIN GLARGINE 100 [IU]/ML
28 INJECTION, SOLUTION SUBCUTANEOUS DAILY
Status: ON HOLD | COMMUNITY
End: 2019-06-06 | Stop reason: SDUPTHER

## 2019-05-29 RX ADMIN — SODIUM CHLORIDE 1000 ML: 900 INJECTION, SOLUTION INTRAVENOUS at 12:27

## 2019-05-29 RX ADMIN — INSULIN LISPRO 4 UNITS: 100 INJECTION, SOLUTION INTRAVENOUS; SUBCUTANEOUS at 22:56

## 2019-05-29 RX ADMIN — PIPERACILLIN SODIUM,TAZOBACTAM SODIUM 3.38 G: 3; .375 INJECTION, POWDER, FOR SOLUTION INTRAVENOUS at 16:41

## 2019-05-29 RX ADMIN — SIMVASTATIN 40 MG: 40 TABLET, FILM COATED ORAL at 22:56

## 2019-05-29 RX ADMIN — SODIUM CHLORIDE 125 ML/HR: 450 INJECTION, SOLUTION INTRAVENOUS at 16:44

## 2019-05-29 RX ADMIN — GABAPENTIN 300 MG: 100 CAPSULE ORAL at 16:41

## 2019-05-29 RX ADMIN — GABAPENTIN 300 MG: 100 CAPSULE ORAL at 22:56

## 2019-05-29 RX ADMIN — Medication 16 G: at 16:40

## 2019-05-29 RX ADMIN — SODIUM CHLORIDE 40 ML: 900 INJECTION, SOLUTION INTRAVENOUS at 15:51

## 2019-05-29 RX ADMIN — SODIUM CHLORIDE 1000 ML: 900 INJECTION, SOLUTION INTRAVENOUS at 13:42

## 2019-05-29 RX ADMIN — VANCOMYCIN HYDROCHLORIDE 1500 MG: 10 INJECTION, POWDER, LYOPHILIZED, FOR SOLUTION INTRAVENOUS at 13:42

## 2019-05-29 RX ADMIN — HEPARIN SODIUM 5000 UNITS: 5000 INJECTION INTRAVENOUS; SUBCUTANEOUS at 22:56

## 2019-05-29 RX ADMIN — PIPERACILLIN SODIUM,TAZOBACTAM SODIUM 3.38 G: 3; .375 INJECTION, POWDER, FOR SOLUTION INTRAVENOUS at 12:29

## 2019-05-29 NOTE — PROGRESS NOTES
MRI ordered for patient, however patient has an allergy to contrast dye, stating \"It was a long time ago but I swelled up\". Dr. Rui Jha notified and says to hold off on the MRI for now. MRI notified.

## 2019-05-29 NOTE — PROGRESS NOTES
Pharmacy Dosing Services: Vancomycin    Indication: Diabetic foot infection    Day of therapy: 1    Other Antimicrobials (Include dose, start day & day of therapy):  Pip/tazo 3.375 gm IV every 6 hours (in ED)    Loading dose (date given): 1500 mg  Current Maintenance dose: None at this time    Goal Vancomycin Level: 15-20  (Trough 15-20 for most infections, 20 for meningitis/osteomyelitis, pre-HD level ~25)    Vancomycin Level (if drawn): None at this time     Significant Cultures: pending    Renal function stable? (unstable defined as SCr increase of 0.5 mg/dL or > 50% increase from baseline, whichever is greater) (Y/N): Y     CAPD, Hemodialysis or Renal Replacement Therapy (Y/N): N     Recent Labs     19  1215   CREA 1.83*   BUN 20*   WBC 5.2     Temp (24hrs), Av.9 °F (36.6 °C), Min:97.9 °F (36.6 °C), Max:97.9 °F (36.6 °C)    Creatinine Clearance (Creatinine Clearance (ml/min)): 46     Regimen assessment: New start - follow BMP  Maintenance dose: 1000 mg IV every 12 hours  Next scheduled level: Trough on  at 1430       Pharmacy will follow daily and adjust medications as appropriate for renal function and/or serum levels.     Thank you,  Aixa Rebolledo, PHARMD

## 2019-05-29 NOTE — ED NOTES
Wound to dorsal foot unable to stage black with some white tissue, approximately 1.5 cm in diameter    Wound to medial red, approximately 1.5 cm in diameter, stage 2, no drainage.

## 2019-05-29 NOTE — ED NOTES
I performed a brief evaluation, including history and physical, of the patient here in triage and I have determined that pt will need further treatment and evaluation from the main side ER physician. I have placed initial orders to help in expediting patients care.      May 29, 2019 at 12:08 PM - ARABELLA Montero        Visit Vitals  BP (!) 72/47 (BP 1 Location: Right arm, BP Patient Position: Sitting)   Pulse (!) 134   Temp 97.9 °F (36.6 °C)   Resp 16   Ht 5' 6\" (1.676 m)   Wt 68 kg (150 lb)   SpO2 98%   BMI 24.21 kg/m²

## 2019-05-29 NOTE — ED NOTES
Spoke with MRI, will hold off on MRI as the patient is receiving antibiotics and fluids.  Patient is septic

## 2019-05-29 NOTE — ED NOTES
TRANSFER - ED to INPATIENT REPORT:    SBAR report made available to receiving floor on this patient being transferred to [address] [place] [address] ([facility])  for routine progression of care       Admitting diagnosis Sepsis ([facility] [address] [A41.9]    Information from the following report(s) SBAR, Kardex and Recent Results was made available to receiving floor. Lines:   Peripheral IV 05/29/19 Left Antecubital (Active)   Site Assessment Clean, dry, & intact 5/29/2019 12:50 PM   Phlebitis Assessment 0 5/29/2019 12:50 PM   Infiltration Assessment 0 5/29/2019 12:50 PM   Dressing Status Clean, dry, & intact 5/29/2019 12:50 PM   Dressing Type Transparent 5/29/2019 12:50 PM   Hub Color/Line Status Pink;Patent; Flushed 5/29/2019 12:50 PM        Medication list confirmed with patient    Opportunity for questions and clarification was provided.       Patient is oriented to time, place, person and situation Sepsis summary completed 2000 ml NS, will hand 40 ml prior to leaving the floor, vanc is till infusing*  Patient is  continent and ambulatory without assist     Valuables transported with patient     Patient transported with:   Monitor  Tech    MAP (Monitor): 110 =Monitored (most recent)  Vitals w/ MEWS Score (last day)     Date/Time MEWS Score Pulse Resp Temp BP Level of Consciousness SpO2    05/29/19 1515    92  13    156/97  (Abnormal)     100 %    05/29/19 1500    92  13    158/96  (Abnormal)     100 %    05/29/19 1445    97  15    143/93  (Abnormal)     100 %    05/29/19 1440    99  20    120/82    100 %    05/29/19 1415    97  13    128/78    100 %    05/29/19 1400    100  12    127/76    100 %    05/29/19 1345    105  (Abnormal)   20    120/69    100 %    05/29/19 1330    108  (Abnormal)       116/88        05/29/19 1315    106  (Abnormal)   15    109/61        05/29/19 1300    110  (Abnormal)   23        100 %    05/29/19 1245    114  (Abnormal)   12    89/52  (Abnormal)     100 %    05/29/19 1209         83/51  (Abnormal)         05/29/19 1206  6  134  (Abnormal)   16  97.9 °F (36.6 °C)  72/47  (Abnormal)   Alert  98 %              Septic Patients:     Lactic Acid  Lab Results   Component Value Date    LACPOC 1.25 05/29/2019    LACPO 3.16 ([facility]) 05/29/2019    (Most recent on top)  Repeat drawn: YES Time: 1521     ALL LACTIC ACIDS GREATER THAN 2 MUST BE REPEATED POC WITHIN 4 HOURS OR PRIOR TO ADMISSION               Total Fluid Bolus initiated and documented on MAR: YES    All ordered antibiotics initiated within first 3 hours of TIME ZERO?   YES

## 2019-05-29 NOTE — H&P
Mark Alicea MD   Physician   Hospitalist   Progress Notes   Signed   Date of Service:  19 6203                          []Kristina copied text    []Sushma for details                                              Internal Medicine History and Physical  Note              NAME:            Black Gillis   :               1975   MRN:               [id number]      PCP:                Andre Singh MD      Date/Time:      2019 1:33 PM        I hereby certify this patient for admission based upon medical necessity as noted below:         Assessment / plan :         #  Sepsis ([address] (2019) , likely from foot infection. Iv vanco and zosyn. Chemistry not available yet. Blood and ulcer CS. ID consulted. Tele bed. Maintain vital signs. IVF. Lactic acid. Presented with hypotension and tachycardia     # Dehydration. IVF. Strict I/O      #   Right foot ulcer, with necrosis of muscle ([facility] [address] (10/18/2018). Podiatrist consulted. Wound care     #   Osteomyelitis of right foot ([address] (2019), possible OM. Possible the source of the infection. ID consulted. As above.     #   Left arm weakness (2019) from previous injury in his neck. Stable.      # DM . Provide SSI, hypoglycemia protocol and frequent Accu checks.         -DVT prophylaxis :  heparin.   - Code Status : FULL  Further management depend on the course of the case and expanded data base. DISPO - pt to be admitted at this time for reasons addressed above, continued hospitalization for ongoing assessment and treatment indicated          Subjective:      CHIEF COMPLAINT: sent from pod clinic      HISTORY OF PRESENT ILLNESS:     Mr. Freida Jorgensen is a 39 y.o.  male who is admitted for sepsi. Mr. Freida Jorgensen presented to the Emergency Department today  complaining of above. He has R foot ulcer on the bottom for about a year and on the side  Recently. Today went to pod clinic and found to have possible OM on the foot and sent to er.  Pt  Deny any symptoms, no pains, no fever. Say his BSL fluctuating . Diagnosed with DM about 15-20 years . Using insulin. In er found to have low bp and high hr , given ivf resuscitation. Given iv Abx and CS sent per er md.              Past Medical History:   Diagnosis Date    Diabetes ([address]                 Past Surgical History:   Procedure Laterality Date    HX GI        NEUROLOGICAL PROCEDURE UNLISTED             Social History            Tobacco Use    Smoking status: Current Every Day Smoker       Packs/day: 0.25    Smokeless tobacco: Never Used   Substance Use Topics    Alcohol use: Yes       Alcohol/week: 3.0 oz       Types: 1 Shots of liquor, 4 Glasses of wine per week       Comment: socially         History reviewed. No pertinent family history.           Allergies   Allergen Reactions    Contrast Agent [Iodine] Rash                 Prior to Admission medications    Medication Sig Start Date End Date Taking? Authorizing Provider   insulin glargine (LANTUS,BASAGLAR) 100 unit/mL (3 mL) inpn 28 Units by SubCUTAneous route daily.     Yes Alexis, MD Patricia   calcium-cholecalciferol, d3, (CALCIUM 600 + D) 600-125 mg-unit tab Take  by mouth.     Yes Alexis, MD Patricia   gabapentin (NEURONTIN) 300 mg capsule Take 300 mg by mouth three (3) times daily.     Yes Provider, Historical   simvastatin (ZOCOR) 20 mg tablet Take 40 mg by mouth nightly.     Yes Alexis, MD Patricia   insulin aspart (NOVOLOG) 100 unit/mL injection by SubCUTAneous route. Indications: TYPE 2 DIABETES MELLITUS       Patricia Espinoza MD   aspirin 81 mg chewable tablet Take 81 mg by mouth daily.  Indications: MYOCARDIAL INFARCTION PREVENTION       Patricia Espinoza MD         Review of Systems:  (bold if positive, otherwise negative), apart from what mentioned in HPI  Apart from above patient deny any other significant complain  Gen:  Weight gain, weight loss, fever, chills, fatigue  Eyes:  Visual changes, pain, conjunctivitis  ENT:  Sore throat, rhinorrhea, decreased hearing  CVS:  Palpitations, chest pain, dizziness, syncope, edema, PND  Pulm:  Cough, dyspnea, sputum, hemoptysis, wheezing  GI:  Abdominal pain, nausea, emesis, diarrhea, constipation, GERD, melena  :  Hematuria, incontinence, nocturia, dysuria, discharge  MS:  Pain, weakness, swelling, arthritis  Skin:  Rash, erythema, abscess, ulcers/ wound, moles  Endo:  Heat intolerance, cold intolerance, weight gain, polyuria  Hem:  Enlarged nodes, bruising, bleeding, night sweats  Renal:  Edema, change in urine, flank pain  Neuro:  Numbness, tingling, weakness, seizure, headache, tremors         VITALS:    Vital signs reviewed; most recent are:     Visit Vitals  /61   Pulse (!) 106   Temp 97.9 °F (36.6 °C)   Resp 15   Ht 5' 6\" (1.676 m)   Wt 68 kg (150 lb)   SpO2 100%   BMI 24.21 kg/m²          SpO2 Readings from Last 6 Encounters:   05/29/19 100%   11/15/18 98%   11/01/18 100%   10/18/18 100%   02/23/13 99%              Intake/Output Summary (Last 24 hours) at 5/29/2019 1355  Last data filed at 5/29/2019 1323      Gross per 24 hour   Intake 1100 ml   Output    Net 1100 ml         Physical Exam:      Gen:  Appear stated age, Well-developed, well-nourished, in no acute distress  HEENT:  Head atraumatic, normocephalic , hearing intact to voice, moist mucous membranes. Neck:  Supple, no masses I appreciate, thyroid non-tender. Resp:  No accessory muscle use,Bilateral BS present, clear breath sounds without wheezes rales or rhonchi  Card:  No murmurs, normal S1, S2 without thrills, bruits or peripheral edema. Abd:  Soft, non-tender, non-distended, normoactive bowel sounds are present, no palpable organomegaly    Musc:  No cyanosis or clubbing. Skin: r foot ulcers x2   No rashes or ulcers, skin turgor is good. Neuro: L UL weakness   Cranial nerves are grossly intact,  follows commands appropriately.   Psych:  Good insight, oriented to person, place and time, alert.     Labs:            Recent Labs     05/29/19  1215 WBC 5.2   HGB 11.6*   HCT 36.7   *          Recent Labs     05/29/19  1215      K 3.8   CL 99*   CO2 31   GLU 77   BUN 20*   CREA 1.83*   CA 10.4*   ALB 4.0   TBILI 0.5   SGOT 26   ALT 24            Lab Results   Component Value Date/Time     Glucose (POC) 391 (H) 02/23/2013 01:37 AM     Glucose (POC) 419 (HH) 02/23/2013 12:25 AM     Glucose, POC 75 05/29/2019 12:25 PM      No results for input(s): PH, PCO2, PO2, HCO3, FIO2 in the last 72 hours. No results for input(s): INR in the last 72 hours.     No lab exists for component: Michael Nunez     Xr Chest Port     Result Date: 5/29/2019  IMPRESSION: No acute pulmonary process identified.         Please refer to radiology reports. Risk of deterioration: high         Total time spent in the care of this patient: [address] [facility] discussed with: Patient, Nursing Staff, Consultant/Specialist and ER MD        Discussed:  Care Plan                  I have personally reviewed all pertinent labs, films and EKGs that have officially resulted.  I reviewed available pertaining electronic documentation outlining the initial presentation as well as the emergency room physician's encounter.     Attending Physician:   Roni Medina MD

## 2019-05-29 NOTE — CONSULTS
Infectious Disease Consultation Note    Date of Admission: 5/29/2019    Date of Consultation: 5/29/2019    Referred by: Dr. Satya Hernandes       Reason for Referral:        Current Antimicrobials: Prior Antimicrobials         Assessment / Plan:     Osteomyelitis, right 5th metatarsal  - acute. Secondary to new dorsolateral ulcer  - xray 5/29: lytic changes distal 5th metatarsal -> continue current abx  -> await Podiatry plans. Anticipate Metatarsal resection. If all infected bone successfully removed, 5 days abx post-op   Transient Hypotension  - promptly resolved with IVF. Likely dehydration. Does not appear septic. Now hypertensive -> monitor   Chronic non-healing ulcer, lateral plantar, R foot    DM    HTN      Microbiology:   5/29 blcx IP x2    Lines / Catheters:   piv    HPI:     39year-old [place] American male with Insulin requiring DM with chronic right lateral plantar ulcer admitted to [facility] 5/29/2019 with new dorsolateral R foot ulcer and osteomyelitis 5th metatarsal.  He has had a chronic right lateral plantar ulcer for over a year which has been followed at the wound clinic and by Dr. Mendoza De La Torre since at least October 2018. Serial photos in wound care notes show a decreasing ulcer but last follow up was in November 2018. This ulcer remained unhealed but around 5 days PTA he developed a blister on the dorsolateral aspect of the right foot which became unroofed leaving a new ulcer. He had no fever or chills or sweats but he had anorexia and did not eat anything for the day PTA. He had no vomiting or diarrhea. Seen at wound clinic today he was directed to the ED. Blood pressure was 72/47 and pulse 134. He was afebrile and WBC count was 5.2. Xray of the right foot showed focal lytic changes in the 5th distal metatarsal compatible with osteomyelitis. He was admitted and started on Vancomycin and zosyn.   Within 2 hours of IV fluid bolus, his blood pressure normalized and subsequently became hypertensive. Tachycardia has resolved    Active Hospital Problems    Diagnosis Date Noted    Osteomyelitis of right foot ([facility] [address] 05/29/2019    Sepsis ([facility] [address] 05/29/2019    Left arm weakness 05/29/2019    Right foot ulcer, with necrosis of muscle ([facility] [address] 10/18/2018     Past Medical History:   Diagnosis Date    Diabetes [facility])      Past Surgical History:   Procedure Laterality Date    HX GI      NEUROLOGICAL PROCEDURE UNLISTED       History reviewed. No pertinent family history. Social History     Socioeconomic History    Marital status: SINGLE     Spouse name: Not on file    Number of children: Not on file    Years of education: Not on file    Highest education level: Not on file   Occupational History    Not on file   Social Needs    Financial resource strain: Not on file    Food insecurity:     Worry: Not on file     Inability: Not on file    Transportation needs:     Medical: Not on file     Non-medical: Not on file   Tobacco Use    Smoking status: Current Every Day Smoker     Packs/day: 0.25    Smokeless tobacco: Never Used   Substance and Sexual Activity    Alcohol use:  Yes     Alcohol/week: 3.0 oz     Types: 1 Shots of liquor, 4 Glasses of wine per week     Comment: socially    Drug use: Yes     Frequency: 7.0 times per week     Types: Marijuana    Sexual activity: Not on file   Lifestyle    Physical activity:     Days per week: Not on file     Minutes per session: Not on file    Stress: Not on file   Relationships    Social connections:     Talks on phone: Not on file     Gets together: Not on file     Attends Islam service: Not on file     Active member of club or organization: Not on file     Attends meetings of clubs or organizations: Not on file     Relationship status: Not on file    Intimate partner violence:     Fear of current or ex partner: Not on file     Emotionally abused: Not on file     Physically abused: Not on file     Forced sexual activity: Not on file   Other Topics Concern     Service Not Asked    Blood Transfusions Not Asked    Caffeine Concern Not Asked    Occupational Exposure Not Asked    Hobby Hazards Not Asked    Sleep Concern Not Asked    Stress Concern Not Asked    Weight Concern Not Asked    Special Diet Not Asked    Back Care Not Asked    Exercise Not Asked    Bike Helmet Not Asked   [address] Not Asked    Self-Exams Not Asked   Social History Narrative    Not on file       Allergies:    Contrast agent [iodine] . Medications:     Current Facility-Administered Medications   Medication Dose Route Frequency    sodium chloride (NS) flush 5-10 mL  5-10 mL IntraVENous PRN    [START ON 5/30/2019] aspirin chewable tablet 81 mg  81 mg Oral DAILY    [START ON 5/30/2019] calcium-vitamin D 600 mg(1,500mg) -200 unit per tablet 1 Tab  1 Tab Oral DAILY    gabapentin (NEURONTIN) capsule 300 mg  300 mg Oral TID    . PHARMACY TO SUBSTITUTE PER PROTOCOL (Reordered from: insulin glargine (LANTUS,BASAGLAR) 100 unit/mL (3 mL) inpn)    Per Protocol    simvastatin (ZOCOR) tablet 40 mg  40 mg Oral QHS    0.45% sodium chloride infusion  125 mL/hr IntraVENous CONTINUOUS    heparin (porcine) injection 5,000 Units  5,000 Units SubCUTAneous Q8H    insulin lispro (HUMALOG) injection   SubCUTAneous AC&HS    glucose chewable tablet 16 g  4 Tab Oral PRN    glucagon (GLUCAGEN) injection 1 mg  1 mg IntraMUSCular PRN    dextrose (D50) infusion 12.5-25 g  25-50 mL IntraVENous PRN    [START ON 5/30/2019] vancomycin (VANCOCIN) 1,000 mg in 0.9% sodium chloride (MBP/ADV) 250 mL adv  1,000 mg IntraVENous Q12H    [START ON 5/31/2019] VANCOMYCIN INFORMATION NOTE   Other ONCE    VANCOMYCIN INFORMATION NOTE   Other Rx Dosing/Monitoring    piperacillin-tazobactam (ZOSYN) 3.375 g in 0.9% sodium chloride (MBP/ADV) 100 mL \"Extended 4 Hours Infusion ####\"   3.375 g IntraVENous Q8H       ROS:   A comprehensive review of systems was negative except for that written in the History of Present Illness. Physical Exam:     Temp (24hrs), Av.7 °F (36.5 °C), Min:97.3 °F (36.3 °C), Max:98 °F (36.7 °C)    Visit Vitals  BP (!) 189/117 (BP 1 Location: Left arm, BP Patient Position: At rest)   Pulse 98   Temp 97.3 °F (36.3 °C)   Resp 16   Ht 5' 6\" (1.676 m)   Wt 68 kg (150 lb)   SpO2 100%   BMI 24.21 kg/m²       General: Well developed, well nourished 39 y.o.  male in no acute distress. ENT: ENT exam normal, no neck nodes or sinus tenderness  Head: normocephalic, without obvious abnormality  Mouth:  mucous membranes moist, pharynx normal without lesions  Neck: supple, symmetrical, trachea midline   Cardio:  regular rate and rhythm, S1, S2 normal, no murmur, click, rub or gallop  Chest: inspection normal - no chest wall deformities or tenderness, respiratory effort normal  Lungs: clear to auscultation, no wheezes or rales and unlabored breathing  Abdomen: soft, non-tender. Bowel sounds normal. No masses, no organomegaly.   Extremities:  no redness or tenderness in the calves or thighs, no edema, new ulcer right dorsolateral foot, chronic plantar ulcer at level of  5th MT  Neuro: Grossly normal    Lab results:     Chemistry  Recent Labs     19   GLU 77      K 3.8   CL 99*   CO2 31   BUN 20*   CREA 1.83*   CA 10.4*   AGAP 9   BUCR 11*   AP 78   TP 8.9*   ALB 4.0   GLOB 4.9*   AGRAT 0.8       CBC w/ Diff  Recent Labs     19   WBC 5.2   RBC 4.01*   HGB 11.6*   HCT 36.7   *   GRANS 60   LYMPH 33   EOS 1       Microbiology  All Micro Results     Procedure Component Value Units Date/Time    CULTURE, BLOOD [[id number]] Collected:  19 1225    Order Status:  Completed Specimen:  Blood Updated:  19     Special Requests: PERIPHERAL        Culture result: PENDING    CULTURE, BLOOD [[id number]] Collected:  19    Order Status:  Completed Specimen:  Blood Updated:  19     Special Requests: PERIPHERAL        Culture result: PENDING             Ling Tabor MD, [facility]  Infectious Disease Specialist  Pager [phone]

## 2019-05-29 NOTE — PROGRESS NOTES
Internal Medicine History and Physical  Note           NAME:  Black Gillis   :   1975   MRN:  [id number]     PCP:  Andre Singh MD     Date/Time:  2019 1:33 PM      I hereby certify this patient for admission based upon medical necessity as noted below:        Assessment / plan :        #  Sepsis ([facility] [address] (2019) , likely from foot infection. Iv vanco and zosyn. Chemistry not available yet. Blood and ulcer CS. ID consulted. Tele bed. Maintain vital signs. IVF. Lactic acid. Presented with hypotension and tachycardia    # Dehydration. IVF. Strict I/O     #   Right foot ulcer, with necrosis of muscle ([facility] [address] (10/18/2018). Podiatrist consulted. Wound care    #   Osteomyelitis of right foot ([facility] [address] (2019), possible OM. Possible the source of the infection. ID consulted. As above. #   Left arm weakness (2019) from previous injury in his neck. Stable. # DM . Provide SSI, hypoglycemia protocol and frequent Accu checks. -DVT prophylaxis :  heparin.   - Code Status : FULL  Further management depend on the course of the case and expanded data base. DISPO - pt to be admitted at this time for reasons addressed above, continued hospitalization for ongoing assessment and treatment indicated        Subjective:     CHIEF COMPLAINT: sent from pod clinic     HISTORY OF PRESENT ILLNESS:     Mr. Freida Jorgensen is a 39 y.o.  male who is admitted for sepsi. Mr. Freida Jorgensen presented to the Emergency Department today  complaining of above. He has R foot ulcer on the bottom for about a year and on the side  Recently. Today went to pod clinic and found to have possible OM on the foot and sent to er. Pt  Deny any symptoms, no pains, no fever. Say his BSL fluctuating . Diagnosed with DM about 15-20 years . Using insulin. In er found to have low bp and high hr , given ivf resuscitation.  Given iv Abx and CS sent per er md.       Past Medical History: Diagnosis Date    Diabetes [facility])         Past Surgical History:   Procedure Laterality Date    HX GI      NEUROLOGICAL PROCEDURE UNLISTED         Social History     Tobacco Use    Smoking status: Current Every Day Smoker     Packs/day: 0.25    Smokeless tobacco: Never Used   Substance Use Topics    Alcohol use: Yes     Alcohol/week: 3.0 oz     Types: 1 Shots of liquor, 4 Glasses of wine per week     Comment: socially        History reviewed. No pertinent family history. Allergies   Allergen Reactions    Contrast Agent [Iodine] Rash        Prior to Admission medications    Medication Sig Start Date End Date Taking? Authorizing Provider   insulin glargine (LANTUS,BASAGLAR) 100 unit/mL (3 mL) inpn 28 Units by SubCUTAneous route daily. Yes Alexis, MD Patricia   calcium-cholecalciferol, d3, (CALCIUM 600 + D) 600-125 mg-unit tab Take  by mouth. Yes Patricia Espinoza MD   gabapentin (NEURONTIN) 300 mg capsule Take 300 mg by mouth three (3) times daily. Yes Provider, Historical   simvastatin (ZOCOR) 20 mg tablet Take 40 mg by mouth nightly. Yes Alexis, MD Patricia   insulin aspart (NOVOLOG) 100 unit/mL injection by SubCUTAneous route. Indications: TYPE 2 DIABETES MELLITUS    Alexis, MD Patricia   aspirin 81 mg chewable tablet Take 81 mg by mouth daily.  Indications: MYOCARDIAL INFARCTION PREVENTION    Alexis, MD Patricia       Review of Systems:  (bold if positive, otherwise negative), apart from what mentioned in HPI  Apart from above patient deny any other significant complain  Gen:  Weight gain, weight loss, fever, chills, fatigue  Eyes:  Visual changes, pain, conjunctivitis  ENT:  Sore throat, rhinorrhea, decreased hearing  CVS:  Palpitations, chest pain, dizziness, syncope, edema, PND  Pulm:  Cough, dyspnea, sputum, hemoptysis, wheezing  GI:  Abdominal pain, nausea, emesis, diarrhea, constipation, GERD, melena  :  Hematuria, incontinence, nocturia, dysuria, discharge  MS:  Pain, weakness, swelling, arthritis  Skin: Rash, erythema, abscess, ulcers/ wound, moles  Endo:  Heat intolerance, cold intolerance, weight gain, polyuria  Hem:  Enlarged nodes, bruising, bleeding, night sweats  Renal:  Edema, change in urine, flank pain  Neuro:  Numbness, tingling, weakness, seizure, headache, tremors       VITALS:    Vital signs reviewed; most recent are:    Visit Vitals  /61   Pulse (!) 106   Temp 97.9 °F (36.6 °C)   Resp 15   Ht 5' 6\" (1.676 m)   Wt 68 kg (150 lb)   SpO2 100%   BMI 24.21 kg/m²     SpO2 Readings from Last 6 Encounters:   05/29/19 100%   11/15/18 98%   11/01/18 100%   10/18/18 100%   02/23/13 99%            Intake/Output Summary (Last 24 hours) at 5/29/2019 1355  Last data filed at 5/29/2019 1323  Gross per 24 hour   Intake 1100 ml   Output    Net 1100 ml        Physical Exam:     Gen:  Appear stated age, Well-developed, well-nourished, in no acute distress  HEENT:  Head atraumatic, normocephalic , hearing intact to voice, moist mucous membranes. Neck:  Supple, no masses I appreciate, thyroid non-tender. Resp:  No accessory muscle use,Bilateral BS present, clear breath sounds without wheezes rales or rhonchi  Card:  No murmurs, normal S1, S2 without thrills, bruits or peripheral edema. Abd:  Soft, non-tender, non-distended, normoactive bowel sounds are present, no palpable organomegaly    Musc:  No cyanosis or clubbing. Skin: r foot ulcers x2   No rashes or ulcers, skin turgor is good. Neuro: L UL weakness   Cranial nerves are grossly intact,  follows commands appropriately. Psych:  Good insight, oriented to person, place and time, alert.     Labs:       Recent Labs     05/29/19  1215   WBC 5.2   HGB 11.6*   HCT 36.7   *     Recent Labs     05/29/19  1215      K 3.8   CL 99*   CO2 31   GLU 77   BUN 20*   CREA 1.83*   CA 10.4*   ALB 4.0   TBILI 0.5   SGOT 26   ALT 24     Lab Results   Component Value Date/Time    Glucose (POC) 391 (H) 02/23/2013 01:37 AM    Glucose (POC) 419 (HH) 02/23/2013 12:25 AM Glucose, POC 75 05/29/2019 12:25 PM     No results for input(s): PH, PCO2, PO2, HCO3, FIO2 in the last 72 hours. No results for input(s): INR in the last 72 hours. No lab exists for component: Goldberg Aw    Xr Chest Port    Result Date: 5/29/2019  IMPRESSION: No acute pulmonary process identified. Please refer to radiology reports. Risk of deterioration: high      Total time spent in the care of this patient: [address] discussed with: Patient, Nursing Staff, Consultant/Specialist and ER MD      Discussed:  Care Plan       I have personally reviewed all pertinent labs, films and EKGs that have officially resulted. I reviewed available pertaining electronic documentation outlining the initial presentation as well as the emergency room physician's encounter.     Attending Physician: Mell Mosley MD

## 2019-05-30 LAB
ALBUMIN SERPL-MCNC: 3 G/DL (ref 3.4–5)
ALBUMIN/GLOB SERPL: 0.8 {RATIO} (ref 0.8–1.7)
ALP SERPL-CCNC: 59 U/L (ref 45–117)
ALT SERPL-CCNC: 18 U/L (ref 16–61)
ANION GAP SERPL CALC-SCNC: 7 MMOL/L (ref 3–18)
AST SERPL-CCNC: 22 U/L (ref 15–37)
BASOPHILS # BLD: 0 K/UL (ref 0–0.1)
BASOPHILS NFR BLD: 1 % (ref 0–2)
BILIRUB DIRECT SERPL-MCNC: 0.2 MG/DL (ref 0–0.2)
BILIRUB SERPL-MCNC: 0.7 MG/DL (ref 0.2–1)
BUN SERPL-MCNC: 11 MG/DL (ref 7–18)
BUN/CREAT SERPL: 11 (ref 12–20)
CALCIUM SERPL-MCNC: 8.2 MG/DL (ref 8.5–10.1)
CHLORIDE SERPL-SCNC: 105 MMOL/L (ref 100–108)
CO2 SERPL-SCNC: 27 MMOL/L (ref 21–32)
CREAT SERPL-MCNC: 1 MG/DL (ref 0.6–1.3)
DIFFERENTIAL METHOD BLD: ABNORMAL
EOSINOPHIL # BLD: 0.1 K/UL (ref 0–0.4)
EOSINOPHIL NFR BLD: 2 % (ref 0–5)
ERYTHROCYTE [DISTWIDTH] IN BLOOD BY AUTOMATED COUNT: 12.6 % (ref 11.6–14.5)
GLOBULIN SER CALC-MCNC: 3.8 G/DL (ref 2–4)
GLUCOSE BLD STRIP.AUTO-MCNC: 118 MG/DL (ref 70–110)
GLUCOSE BLD STRIP.AUTO-MCNC: 138 MG/DL (ref 70–110)
GLUCOSE BLD STRIP.AUTO-MCNC: 265 MG/DL (ref 70–110)
GLUCOSE BLD STRIP.AUTO-MCNC: 309 MG/DL (ref 70–110)
GLUCOSE BLD STRIP.AUTO-MCNC: 58 MG/DL (ref 70–110)
GLUCOSE BLD STRIP.AUTO-MCNC: 78 MG/DL (ref 70–110)
GLUCOSE SERPL-MCNC: 29 MG/DL (ref 74–99)
HCT VFR BLD AUTO: 32 % (ref 36–48)
HGB BLD-MCNC: 10.1 G/DL (ref 13–16)
LYMPHOCYTES # BLD: 1.6 K/UL (ref 0.9–3.6)
LYMPHOCYTES NFR BLD: 36 % (ref 21–52)
MAGNESIUM SERPL-MCNC: 2.2 MG/DL (ref 1.6–2.6)
MCH RBC QN AUTO: 29.4 PG (ref 24–34)
MCHC RBC AUTO-ENTMCNC: 31.6 G/DL (ref 31–37)
MCV RBC AUTO: 93.3 FL (ref 74–97)
MONOCYTES # BLD: 0.4 K/UL (ref 0.05–1.2)
MONOCYTES NFR BLD: 9 % (ref 3–10)
NEUTS SEG # BLD: 2.2 K/UL (ref 1.8–8)
NEUTS SEG NFR BLD: 52 % (ref 40–73)
PHOSPHATE SERPL-MCNC: 4 MG/DL (ref 2.5–4.9)
PLATELET # BLD AUTO: 368 K/UL (ref 135–420)
PMV BLD AUTO: 9.9 FL (ref 9.2–11.8)
POTASSIUM SERPL-SCNC: 4 MMOL/L (ref 3.5–5.5)
PROT SERPL-MCNC: 6.8 G/DL (ref 6.4–8.2)
RBC # BLD AUTO: 3.43 M/UL (ref 4.7–5.5)
SODIUM SERPL-SCNC: 139 MMOL/L (ref 136–145)
WBC # BLD AUTO: 4.3 K/UL (ref 4.6–13.2)

## 2019-05-30 PROCEDURE — 85025 COMPLETE CBC W/AUTO DIFF WBC: CPT

## 2019-05-30 PROCEDURE — 36415 COLL VENOUS BLD VENIPUNCTURE: CPT

## 2019-05-30 PROCEDURE — 74011250636 HC RX REV CODE- 250/636: Performed by: INTERNAL MEDICINE

## 2019-05-30 PROCEDURE — 74011250637 HC RX REV CODE- 250/637: Performed by: INTERNAL MEDICINE

## 2019-05-30 PROCEDURE — 74011000258 HC RX REV CODE- 258: Performed by: INTERNAL MEDICINE

## 2019-05-30 PROCEDURE — 84100 ASSAY OF PHOSPHORUS: CPT

## 2019-05-30 PROCEDURE — 83735 ASSAY OF MAGNESIUM: CPT

## 2019-05-30 PROCEDURE — 65660000000 HC RM CCU STEPDOWN

## 2019-05-30 PROCEDURE — 80048 BASIC METABOLIC PNL TOTAL CA: CPT

## 2019-05-30 PROCEDURE — 80076 HEPATIC FUNCTION PANEL: CPT

## 2019-05-30 PROCEDURE — 74011636637 HC RX REV CODE- 636/637: Performed by: INTERNAL MEDICINE

## 2019-05-30 PROCEDURE — 82962 GLUCOSE BLOOD TEST: CPT

## 2019-05-30 RX ADMIN — INSULIN LISPRO 9 UNITS: 100 INJECTION, SOLUTION INTRAVENOUS; SUBCUTANEOUS at 23:34

## 2019-05-30 RX ADMIN — GABAPENTIN 300 MG: 100 CAPSULE ORAL at 08:31

## 2019-05-30 RX ADMIN — Medication 16 G: at 07:58

## 2019-05-30 RX ADMIN — PIPERACILLIN SODIUM,TAZOBACTAM SODIUM 3.38 G: 3; .375 INJECTION, POWDER, FOR SOLUTION INTRAVENOUS at 16:48

## 2019-05-30 RX ADMIN — INSULIN LISPRO 9 UNITS: 100 INJECTION, SOLUTION INTRAVENOUS; SUBCUTANEOUS at 12:01

## 2019-05-30 RX ADMIN — PIPERACILLIN SODIUM,TAZOBACTAM SODIUM 3.38 G: 3; .375 INJECTION, POWDER, FOR SOLUTION INTRAVENOUS at 08:30

## 2019-05-30 RX ADMIN — HEPARIN SODIUM 5000 UNITS: 5000 INJECTION INTRAVENOUS; SUBCUTANEOUS at 14:31

## 2019-05-30 RX ADMIN — INSULIN GLARGINE 10 UNITS: 100 INJECTION, SOLUTION SUBCUTANEOUS at 09:00

## 2019-05-30 RX ADMIN — PIPERACILLIN SODIUM,TAZOBACTAM SODIUM 3.38 G: 3; .375 INJECTION, POWDER, FOR SOLUTION INTRAVENOUS at 01:14

## 2019-05-30 RX ADMIN — SODIUM CHLORIDE 125 ML/HR: 450 INJECTION, SOLUTION INTRAVENOUS at 01:14

## 2019-05-30 RX ADMIN — HEPARIN SODIUM 5000 UNITS: 5000 INJECTION INTRAVENOUS; SUBCUTANEOUS at 23:33

## 2019-05-30 RX ADMIN — ASPIRIN 81 MG 81 MG: 81 TABLET ORAL at 08:31

## 2019-05-30 RX ADMIN — SIMVASTATIN 40 MG: 40 TABLET, FILM COATED ORAL at 23:34

## 2019-05-30 RX ADMIN — GABAPENTIN 300 MG: 100 CAPSULE ORAL at 16:48

## 2019-05-30 RX ADMIN — HEPARIN SODIUM 5000 UNITS: 5000 INJECTION INTRAVENOUS; SUBCUTANEOUS at 06:53

## 2019-05-30 RX ADMIN — SODIUM CHLORIDE 1250 MG: 900 INJECTION, SOLUTION INTRAVENOUS at 14:31

## 2019-05-30 RX ADMIN — Medication 1 TABLET: at 08:31

## 2019-05-30 RX ADMIN — SODIUM CHLORIDE 1000 MG: 900 INJECTION, SOLUTION INTRAVENOUS at 04:37

## 2019-05-30 RX ADMIN — GABAPENTIN 300 MG: 100 CAPSULE ORAL at 23:34

## 2019-05-30 NOTE — DIABETES MGMT
Diabetes Patient/Family Education Record  Factors That  May Influence Patients Ability  to Learn or  Comply with Recommendations   []   Language barrier    []   Cultural needs   []   Motivation    []   Cognitive limitation    []   Physical   []   Education    []   Physiological factors   []   Hearing/vision/speaking impairment   []   Uatsdin beliefs    []   Financial factors   []  Other:   [x]  No factors identified at this time.      Person Instructed:   [x]   Patient   []   Family   []  Other     Preference for Learning:   [x]   Verbal   []   Written   []  Demonstration     Level of Comprehension & Competence:    [x]  Good                                      [] Fair                                     []  Poor                             [x]  Needs Reinforcement   [x]  Teachback completed    Education Component:   [x]  Medication management,  and potential medication interactions    [x]  Nutritional management -obtain usual meal pattern   []  Exercise   [x]  Signs, symptoms, and treatment of hyperglycemia and hypoglycemia   [x] Prevention, recognition and treatment of hyperglycemia and hypoglycemia   [x]  Importance of blood glucose monitoring and how to obtain a blood glucose meter -has meter   []  Instruction on use of the blood glucose meter   [x]  Discuss the importance of HbA1C monitoring    [x]  Sick day guidelines   []  Proper use and disposal of lancets, needles, syringes or insulin pens (if appropriate)   []  Potential long-term complications (retinopathy, kidney disease, neuropathy, foot care)   [] Information about whom to contact in case of emergency or for more information    [x]  Goal:  Patient/family will demonstrate understanding of Diabetes Self Management Skills by: (date) _6/7______  Plan for post-discharge education or self-management support:    [] Outpatient class schedule provided            [] Patient Declined    [] Scheduled for outpatient classes (date) _______  Verify:  Does patient understand how diabetes medications work? ____yes________________________  Does patient know what their most recent A1c is? _____8.5______________________________  Does patient monitor glucose at home? _______once a day____________________________________  Does patient have difficulty obtaining diabetes medications or testing supplies? _____no- $10/mo____________       Uriel Sepulveda RD

## 2019-05-30 NOTE — PROGRESS NOTES
Received critical lab value Glucose 29; called and spoke to RN caring for patient. RN stated patient is being treated.

## 2019-05-30 NOTE — PROGRESS NOTES
Problem: Discharge Planning  Goal: *Discharge to safe environment  Outcome: Progressing Towards Goal    Plan: home    Reason for Admission:   Sepsis, diabetic foot infection                  RRAT Score: 16                 Do you (patient/family) have any concerns for transition/discharge? Not @ this time              Plan for utilizing home health:   Depending on ?wound care needs. Current Advanced Directive/Advance Care Plan:   Not on file    Likelihood of readmission? Mod/Yellow            Transition of Care Plan:     Home with possible home health & out-pt follow up when medically stable. Chart reviewed. Met with pt., verified all demographics. States has MCR/ASHLEY ins. [facility] Dr. Ingrid Rodrigues is his PCP, last seen approx few months ago. Ayaz Bright, mother: [phone]. Lives alone. Uses no DME  Independent with ADL's prior to admit. Not receiving any home health. Will cont to follow for any needs. Lara ConleyRN,ext [phone]. Patient has designated no one to participate in his/her discharge plan and to receive any needed information, will listen himself. Name:   Address:  Phone number:    Care Management Interventions  PCP Verified by CM: Yes(Dr. Ingrid Rodrigues, last seen few months ago. )  Palliative Care Criteria Met (RRAT>21 & CHF Dx)?: No  Mode of Transport at Discharge:  Other (see comment)(family/friends)  Transition of Care Consult (CM Consult): Discharge Planning  Discharge Durable Medical Equipment: No  Physical Therapy Consult: No  Occupational Therapy Consult: No  Speech Therapy Consult: No  Current Support Network: Lives Alone  Confirm Follow Up Transport: Self  Plan discussed with Pt/Family/Caregiver: Yes  Discharge Location  Discharge Placement: Home

## 2019-05-30 NOTE — DIABETES MGMT
Glycemic Control: Contacted by pharmacy re safety of administering 28 units of Lantus   With BG 55-60-75 requiring Hypoglycemia treatment. Recommend retiming and reducing to 10 units  Lantus  q 24 hrs.  Sammi KELLOGG    Recent Glucose Results:   Lab Results   Component Value Date/Time    GLU 77 05/29/2019 12:15 PM    GLUCPOC 86 05/29/2019 05:02 PM    GLUCPOC 55 (L) 05/29/2019 04:34 PM    GLUCPOC 60 (L) 05/29/2019 04:18 PM    GLUCPOC 75 05/29/2019 12:25 PM

## 2019-05-30 NOTE — PROGRESS NOTES
Bedside and Verbal shift change report given to Nitin Howard RN (oncoming nurse) by Mery Toscano RN (offgoing nurse). Report included the following information SBAR, Kardex, Intake/Output, MAR and Recent Results. 1915-  Bedside and Verbal shift change report given to Mery Toscano RN (oncoming nurse) by Nitin Howard RN (offgoing nurse). Report included the following information SBAR, Kardex, Intake/Output, MAR and Recent Results.

## 2019-05-30 NOTE — DIABETES MGMT
NUTRITIONAL ASSESSMENT GLYCEMIC CONTROL/ PLAN OF CARE     Evaristo Gracia           39 y.o.           5/29/2019                 1. Right foot ulcer, with necrosis of muscle ([address]       Diabetic polyneuropathy ([address] E11.42    Right foot ulcer, with fat layer exposed ([address] L97.512    Osteomyelitis of right foot ([address] M86.9    Sepsis ([address] A41.9    Left arm weakness R29.898      INTERVENTIONS/PLAN:   Pt is hypoglycemia prone. BG 29 this am before given long acting insulin and he did not feel symptoms. Added HS snack for hypoglycemia prevention. ASSESSMENT:   Nutritional Status:    Pt screened for nutritional status. He is 5'6\"  150 lbs  Ideal wt 142 lbs. Pt is at an appropriate weight as evidenced by 105% ideal weight and BMI= 24.5kg/m2. Pt stated usual meal pattern. Discussed tips he has been given in the past for improving his diet. Pt is well nourished at this time. Diabetes Management:   Recent blood glucose:        Lab Results   Component Value Date/Time    GLU 29 (LL) 05/30/2019 05:30 AM    GLUCPOC 309 (H) 05/30/2019 11:31 AM    GLUCPOC 118 (H) 05/30/2019 08:16 AM    GLUCPOC 78 05/30/2019 07:51 AM     within target range (non-ICU: <140; ICU<180): [] Yes   [x]  No    Current Insulin regimen: lantus decreased from 28 units to 10 units daily. Home medication/insulin regimen:   Key Antihyperglycemic Medications             insulin glargine (LANTUS,BASAGLAR) 100 unit/mL (3 mL) inpn (Taking) 28 Units by SubCUTAneous route daily. insulin aspart (NOVOLOG) 100 unit/mL injection by SubCUTAneous route. Indications: TYPE 2 DIABETES MELLITUS        HbA1c: 8.5% equivalent  to ave Blood Glucose of 195 mg/dl for 2-3 months prior to admission  Adequate glycemic control PTA:  [] Yes  [x] No   Checks BG in am- discussed timing and 2 hrs after meals.     SUBJECTIVE/OBJECTIVE:   Information obtained from: Pt reports he only pays $10 /month for Diabetic meds- insulin    Diet: Diabetic Diet  Patient Vitals for the past 100 hrs:   % Diet Eaten   05/29/19 1812 100 %   Observed intake at the breakfast meal-90%  Medications: [x]                Reviewed     Most Recent POC Glucose:   Recent Labs     05/30/19  0530 05/29/19  1215   GLU 29* 77         Labs:   Lab Results   Component Value Date/Time    Hemoglobin A1c 8.5 (H) 05/29/2019 12:15 PM     Lab Results   Component Value Date/Time    Sodium 139 05/30/2019 05:30 AM    Potassium 4.0 05/30/2019 05:30 AM    Chloride 105 05/30/2019 05:30 AM    CO2 27 05/30/2019 05:30 AM    Anion gap 7 05/30/2019 05:30 AM    Glucose 29 (LL) 05/30/2019 05:30 AM    BUN 11 05/30/2019 05:30 AM    Creatinine 1.00 05/30/2019 05:30 AM    Calcium 8.2 (L) 05/30/2019 05:30 AM    Magnesium 2.2 05/30/2019 05:30 AM    Phosphorus 4.0 05/30/2019 05:30 AM    Albumin 3.0 (L) 05/30/2019 05:30 AM       Anthropometrics: IBW : 64.4 kg (142 lb), % IBW (Calculated): 105.63 %, BMI (calculated): 24.2  Wt Readings from Last 1 Encounters:   05/29/19 68 kg (150 lb)      Ht Readings from Last 1 Encounters:   05/29/19 5' 6\" (1.676 m)     Estimated Nutrition Needs:  2040 Kcals/day, Protein (g): 80 g Fluid (ml): 2000 ml  Based on:   [x]          Actual BW    []          ABW   []            Adjusted BW    Nutrition Diagnoses:      BG below and above target range- monitor for adjustment. Nutrition Interventions: Encouraged increased intake at meals to meet calorie and protein requirements and to prevent further hypoglycemia. Goal:   Blood glucose will be within target range of  mg/dL by 6/3/19. Wt maintenance by 6/10    Pt will select nutrient dense food choices by 6/10. .     Nutrition Monitoring and Evaluation      []     Monitor po intake on meal rounds  [x]     Continue inpatient monitoring and intervention  []     Other:      Nutrition Risk:  []   High     [x]  Moderate    []  Minimal/Uncompromised    Marjan Galvez RD  pgr [phone]

## 2019-05-30 NOTE — PROGRESS NOTES
conducted an initial consultation and Spiritual Assessment for Ekta Agosto, who is a 39 y.o.,male. Patients Primary Language is: [place]. According to the patients EMR Protestant Affiliation is: Liset Olson. The reason the Patient came to the hospital is:   Patient Active Problem List    Diagnosis Date Noted    Osteomyelitis of right foot ([facility] 05/29/2019    Sepsis ([address] 05/29/2019    Left arm weakness 05/29/2019    Right foot ulcer, with necrosis of muscle ([address] 10/18/2018    Diabetic polyneuropathy ([address] 10/18/2018    Right foot ulcer, with fat layer exposed ([facility] 10/18/2018        The  provided the following Interventions:  Initiated a relationship of care and support. Explored issues of gladis, spirituality and/or Adventist needs while hospitalized. Listened empathically. Provided chaplaincy education. Provided information about Spiritual Care Services. Offered prayer and assurance of continued prayers on patient's behalf. Chart reviewed. The following outcomes were achieved:  Patient shared some information about their medical narrative and spiritual journey/beliefs. Patient processed feeling about current hospitalization. Patient expressed gratitude for the 's visit. Assessment:  Patient did not indicate any spiritual or Adventist issues which require Spiritual Care Services interventions at this time. Patient does not have any Adventist/cultural needs that will affect patients preferences in health care. Plan:  Chaplains will continue to follow and will provide pastoral care on an as needed or requested basis.  recommends bedside caregivers page  on duty if patient shows signs of acute spiritual or emotional distress.     [facility]   Staff [address] [facility]   [phone]

## 2019-05-30 NOTE — WOUND CARE
Received IP wound care consult and reviewed EMR. Patient was seen in podiatry office on 5/29/19 and sent to ED for further treatment. Patient pending surgical intervention with podiatry.

## 2019-05-30 NOTE — PROGRESS NOTES
1929: Assumed pt care. Received pt resting in bed, pt is alert and oriented x 4. Denies any pain at this time. No signs of distress. Call bell within reach. 5-30-19 2256: received a call from the lab, notified by Kiya Lee that pt has positive blood culture result of gram positive cocci in pairs and chains. Verbal read back provided. 0185: paged Dr. Monica Poon. 6725: still awaiting on Dr. Monica Poon to call back. Paged him again. 0860: still no call back from Dr. Monica Poon, paged him again. [phone]: called [place] [phone], notified her that Dr. Monica Poon has not called me back yet after paging him 3 times, she said, \"Ok I will text him. \" advised by charge nurse Samuel Hickman to call Dr. Monica Poon directly at [place] [address]. Called Dr. Monica Poon, notified him that pt has positive blood culture of gram positive cocci in pairs and chains. Informed him that pt is on zosyn and vanco, Dr. Monica Poon said, \"Ok, that should be fine. \" RBV provided. 9361: Bedside and Verbal shift change report given to [facility]  (oncoming nurse) by Tawnya Bryson   (offgoing nurse). Report included the following information SBAR, Intake/Output, MAR and Recent Results.

## 2019-05-30 NOTE — PROGRESS NOTES
Internal Medicine Progress Note        NAME: Faustino Woodson   :  1975  MRM:  [id number]    Date/Time: 2019        ASSESSMENT/PLAN:        #  Sepsis POA , likely from foot infection. Iv vanco and zosyn. Chemistry not available yet. Blood culture and ulcer CS. ID consulted. Tele bed. Maintain vital signs. IVF. Lactic acid. Presented with hypotension and tachycardia.      # Dehydration. IVF. Strict I/O      #   Right foot ulcer, with necrosis of muscle ([address] (10/18/2018). Podiatrist consulted. Wound care     #   Osteomyelitis, right 5th metatarsal, poa , possible OM. Possible the source of the infection. ID consulted. As above.     #   Left arm weakness (2019) from previous injury in his neck. Stable.      # DM . Uncontrolled with hyperglycemia. Provide SSI, hypoglycemia protocol and frequent Accu checks.       # Episode of hypoglycemia. Tend to have hypoglycemia in hospital per nursing. Decreased lantus. BSL better. -DVT prophylaxis :  heparin.   - Code Status : FULL      Lab Review:     Recent Labs     19  0519  1215   WBC 4.3* 5.2   HGB 10.1* 11.6*   HCT 32.0* 36.7    459*     Recent Labs     19  0519  1215    139   K 4.0 3.8    99*   CO2 27 31   GLU 29* 77   BUN 11 20*   CREA 1.00 1.83*   CA 8.2* 10.4*   MG 2.2  --    PHOS 4.0  --    ALB 3.0* 4.0   TBILI 0.7 0.5   SGOT 22 26   ALT 18 24     Lab Results   Component Value Date/Time    Glucose (POC) 309 (H) 2019 11:31 AM    Glucose (POC) 118 (H) 2019 08:16 AM    Glucose (POC) 78 2019 07:51 AM    Glucose (POC) 58 (L) 2019 07:26 AM    Glucose (POC) 231 (H) 2019 09:35 PM    Glucose, POC 75 2019 12:25 PM     No results for input(s): PH, PCO2, PO2, HCO3, FIO2 in the last 72 hours. No results for input(s): INR in the last 72 hours.     No lab exists for component: INREXT    No results found for: SDES  Lab Results   Component Value Date/Time    Culture result: NO GROWTH AFTER 19 HOURS 05/29/2019 12:25 PM    Culture result: CULTURE IN [facility] UPDATES TO FOLLOW 05/29/2019 12:15 PM    Culture result: Sent to [facility] for ID/Susceptibility if indicated 05/29/2019 12:15 PM              Subjective:     Chief Complaint:      Offer no complain. Foot feel better    ROS:  (bold if positive,otherwise negative)    Fever/chills ,  Dysuria   Cough , Sputum , SOB/ONEAL , Chest Pain     Diarrhea ,Nausea/Vomit , Abd Pain , Constipation    Tolerating Diet                Objective:     Vitals:  Last 24hrs VS reviewed since prior progress note. Most recent are:    Visit Vitals  /84 (BP 1 Location: Left arm, BP Patient Position: At rest)   Pulse 88   Temp 98.7 °F (37.1 °C)   Resp 16   Ht 5' 6\" (1.676 m)   Wt 68 kg (150 lb)   SpO2 98%   BMI 24.21 kg/m²     SpO2 Readings from Last 6 Encounters:   05/30/19 98%   11/15/18 98%   11/01/18 100%   10/18/18 100%   02/23/13 99%            Intake/Output Summary (Last 24 hours) at 5/30/2019 1151  Last data filed at 5/30/2019 0659  Gross per 24 hour   Intake 3705 ml   Output 1350 ml   Net 2355 ml          Physical Exam:   Gen:  Appear stated age, Well-developed, well-nourished, in no acute distress  HEENT:  Head atraumatic, normocephalic , hearing intact to voice, moist mucous membranes. Neck:  Supple, no masses I appreciate, thyroid non-tender. Resp:  No accessory muscle use,Bilateral BS present, clear breath sounds without wheezes rales or rhonchi  Card:  No murmurs, normal S1, S2 without thrills, bruits or peripheral edema. Abd:  Soft, non-tender, non-distended, normoactive bowel sounds are present, no palpable organomegaly    Musc:  No cyanosis or clubbing. Skin: r foot ulcers x2   No rashes or ulcers, skin turgor is good. Neuro: L UL weakness   Cranial nerves are grossly intact,  follows commands appropriately. Psych:  Good insight, oriented to person, place and time, alert.     Medications Reviewed: (see below)    Lab Data Reviewed: (see below)    ______________________________________________________________________    Medications:     Current Facility-Administered Medications   Medication Dose Route Frequency    sodium chloride (NS) flush 5-10 mL  5-10 mL IntraVENous PRN    aspirin chewable tablet 81 mg  81 mg Oral DAILY    calcium-vitamin D 600 mg(1,500mg) -200 unit per tablet 1 Tab  1 Tab Oral DAILY    gabapentin (NEURONTIN) capsule 300 mg  300 mg Oral TID    simvastatin (ZOCOR) tablet 40 mg  40 mg Oral QHS    0.45% sodium chloride infusion  125 mL/hr IntraVENous CONTINUOUS    heparin (porcine) injection 5,000 Units  5,000 Units SubCUTAneous Q8H    insulin lispro (HUMALOG) injection   SubCUTAneous AC&HS    glucose chewable tablet 16 g  4 Tab Oral PRN    glucagon (GLUCAGEN) injection 1 mg  1 mg IntraMUSCular PRN    dextrose (D50) infusion 12.5-25 g  25-50 mL IntraVENous PRN    vancomycin (VANCOCIN) 1,000 mg in 0.9% sodium chloride (MBP/ADV) 250 mL adv  1,000 mg IntraVENous Q12H    [START ON 5/31/2019] VANCOMYCIN INFORMATION NOTE   Other ONCE    VANCOMYCIN INFORMATION NOTE   Other Rx Dosing/Monitoring    piperacillin-tazobactam (ZOSYN) 3.375 g in 0.9% sodium chloride (MBP/ADV) 100 mL \"Extended 4 Hours Infusion ####\"   3.375 g IntraVENous Q8H    insulin glargine (LANTUS) injection 10 Units  10 Units SubCUTAneous DAILY          Total time spent with patient: 35 minutes                  Care Plan discussed with: Patient, Care Manager, Nursing Staff and Consultant/Specialist    Discussed:  Care Plan and D/C Planning    Prophylaxis:  Hep SQ    Disposition:  Home w/Family             Attending Physician: Roni Medina MD

## 2019-05-30 NOTE — PROGRESS NOTES
Infectious Disease Follow-up Note      Date of Admission: 5/29/2019     Date of Note:  5/30/2019      Summary:     40 y/o AAM w/ DM, HTN chronic R lateral plantar ulcer admitted w/new dorsolateral R foot ulcer and OM 5th MT on xray. Transient hypotension promptly responsive to fluids - not septic. Interval History:     Lying in bed. No new complaints. Appears comfortable. Awaiting Podiatry visit. Current Antimicrobials: Prior Antimicrobials    Vancom, zosyn 5/29 - 1        Assessment / Plan:      Osteomyelitis, right 5th metatarsal  - acute. Secondary to new dorsolateral ulcer  - xray 5/29: lytic changes distal 5th metatarsal -> continue current abx  -> await Podiatry plans. Anticipate Metatarsal resection. If all infected bone successfully removed, 5 days abx post-op   Transient Hypotension  - promptly resolved with IVF. Likely dehydration. Does not appear septic.  Now hypertensive -> monitor   Chronic non-healing ulcer, lateral plantar, R foot     DM     HTN        Microbiology:   5/29      blcx IP x2     Lines / Catheters:   piv    Patient Active Problem List   Diagnosis Code    Right foot ulcer, with necrosis of muscle ([address] L97.513    Diabetic polyneuropathy ([address] E11.42    Right foot ulcer, with fat layer exposed ([address] L97.512    Osteomyelitis of right foot ([address] M86.9    Sepsis ([address] A41.9    Left arm weakness R29.898       Current Facility-Administered Medications   Medication Dose Route Frequency    vancomycin (VANCOCIN) 1,250 mg in 0.9% sodium chloride 250 mL IVPB  1,250 mg IntraVENous Q12H    [START ON 5/31/2019] VANCOMYCIN INFORMATION NOTE   Other ONCE    sodium chloride (NS) flush 5-10 mL  5-10 mL IntraVENous PRN    aspirin chewable tablet 81 mg  81 mg Oral DAILY    calcium-vitamin D 600 mg(1,500mg) -200 unit per tablet 1 Tab  1 Tab Oral DAILY    gabapentin (NEURONTIN) capsule 300 mg  300 mg Oral TID    simvastatin (ZOCOR) tablet 40 mg  40 mg Oral QHS    0.45% sodium chloride infusion  125 mL/hr IntraVENous CONTINUOUS    heparin (porcine) injection 5,000 Units  5,000 Units SubCUTAneous Q8H    insulin lispro (HUMALOG) injection   SubCUTAneous AC&HS    glucose chewable tablet 16 g  4 Tab Oral PRN    glucagon (GLUCAGEN) injection 1 mg  1 mg IntraMUSCular PRN    dextrose (D50) infusion 12.5-25 g  25-50 mL IntraVENous PRN    VANCOMYCIN INFORMATION NOTE   Other Rx Dosing/Monitoring    piperacillin-tazobactam (ZOSYN) 3.375 g in 0.9% sodium chloride (MBP/ADV) 100 mL \"Extended 4 Hours Infusion ####\"   3.375 g IntraVENous Q8H    insulin glargine (LANTUS) injection 10 Units  10 Units SubCUTAneous DAILY       Objective:     Visit Vitals  /73 (BP 1 Location: Left arm, BP Patient Position: At rest)   Pulse 98   Temp 98.5 °F (36.9 °C)   Resp 16   Ht 5' 6\" (1.676 m)   Wt 68 kg (150 lb)   SpO2 98%   BMI 24.21 kg/m²       Temp (24hrs), Av °F (36.7 °C), Min:97.3 °F (36.3 °C), Max:98.7 °F (37.1 °C)      General: Well developed, well nourished 39 y.o.  male in no acute distress. ENT: ENT exam normal, no neck nodes or sinus tenderness  Head: normocephalic, without obvious abnormality  Mouth:  mucous membranes moist, pharynx normal without lesions  Neck: supple, symmetrical, trachea midline   Cardio:  regular rate and rhythm, S1, S2 normal, no murmur, click, rub or gallop  Chest: inspection normal - no chest wall deformities or tenderness, respiratory effort normal  Lungs: clear to auscultation, no wheezes or rales and unlabored breathing  Abdomen: soft, non-tender. Bowel sounds normal. No masses, no organomegaly.   Extremities:  no redness or tenderness in the calves or thighs, no edema, new ulcer right dorsolateral foot, chronic plantar ulcer at level of  5th MT  Neuro: Grossly normal        Lab results     Chemistry  Recent Labs     19  0530 19  1215   GLU 29* 77    139   K 4.0 3.8    99*   CO2 27 31   BUN 11 20*   CREA 1.00 1.83*   CA 8.2* 10.4* AGAP 7 9   BUCR 11* 11*   AP 59 78   TP 6.8 8.9*   ALB 3.0* 4.0   GLOB 3.8 4.9*   AGRAT 0.8 0.8       CBC w/ Diff  Recent Labs     05/30/19  0530 05/29/19  1215   WBC 4.3* 5.2   RBC 3.43* 4.01*   HGB 10.1* 11.6*   HCT 32.0* 36.7    459*   GRANS 52 60   LYMPH 36 33   EOS 2 1       Microbiology  All Micro Results     Procedure Component Value Units Date/Time    CULTURE, BLOOD [[id number]] Collected:  05/29/19 1225    Order Status:  Completed Specimen:  Blood Updated:  05/30/19 0843     Special Requests: PERIPHERAL        Culture result: NO GROWTH AFTER 19 HOURS       CULTURE, BLOOD [[id number]] Collected:  05/29/19 1215    Order Status:  Completed Specimen:  Blood Updated:  05/30/19 0333     Special Requests: PERIPHERAL        GRAM STAIN       ANAEROBIC BOTTLE GRAM POSITIVE COCCI IN CHAINS IN PAIRS                  SMEAR CALLED TO AND CORRECTLY REPEATED BY: Sonny Renee RN,3000, ON 5/30/19 AT 0329 TO S           Culture result:       CULTURE IN [address] UPDATES TO FOLLOW                  Sent to [facility] for ID/Susceptibility if indicated                   Rosales Muir MD, [facility]  Infectious Disease Specialist  Pager [phone]

## 2019-05-30 NOTE — CONSULTS
Podiatry Consult    Subjective:         Date of Consultation: May 30, 2019     Referring Physician: Dr Daquan Ahuja    Mr Skip Shannon is a 38y/o male who is a 39 y.o. male with DM, HTN. Who was admitted for further work up for right foot NON healing ulcers lateral and sub 5th met head despite prior treatment. Patient Active Problem List    Diagnosis Date Noted    Osteomyelitis of right foot ([facility] 05/29/2019    Sepsis ([facility] 05/29/2019    Left arm weakness 05/29/2019    Right foot ulcer, with necrosis of muscle ([address] 10/18/2018    Diabetic polyneuropathy ([address] 10/18/2018    Right foot ulcer, with fat layer exposed ([facility] 10/18/2018     Past Medical History:   Diagnosis Date    Diabetes ([facility]       Past Surgical History:   Procedure Laterality Date    HX GI      NEUROLOGICAL PROCEDURE UNLISTED        History reviewed. No pertinent family history. Social History     Tobacco Use    Smoking status: Current Every Day Smoker     Packs/day: 0.25    Smokeless tobacco: Never Used   Substance Use Topics    Alcohol use:  Yes     Alcohol/week: 3.0 oz     Types: 1 Shots of liquor, 4 Glasses of wine per week     Comment: socially     Current Facility-Administered Medications   Medication Dose Route Frequency Provider Last Rate Last Dose    vancomycin (VANCOCIN) 1,250 mg in 0.9% sodium chloride 250 mL IVPB  1,250 mg IntraVENous Q12H Karen Monge  mL/hr at 05/30/19 1431 1,250 mg at 05/30/19 1431    [START ON 5/31/2019] VANCOMYCIN INFORMATION NOTE   Other ONCE Karen Monge MD        sodium chloride (NS) flush 5-10 mL  5-10 mL IntraVENous PRN Karen Monge MD        aspirin chewable tablet 81 mg  81 mg Oral DAILY Karen Monge MD   81 mg at 05/30/19 0831    calcium-vitamin D 600 mg(1,500mg) -200 unit per tablet 1 Tab  1 Tab Oral DAILY Karen Monge MD   1 Tab at 05/30/19 0831    gabapentin (NEURONTIN) capsule 300 mg  300 mg Oral TID Karen Monge MD   300 mg at 05/30/19 1756  simvastatin (ZOCOR) tablet 40 mg  40 mg Oral QHS Sheryl Varner MD   40 mg at 05/29/19 2256    0.45% sodium chloride infusion  125 mL/hr IntraVENous CONTINUOUS Sheryl Varner  mL/hr at 05/30/19 0114 125 mL/hr at 05/30/19 0114    heparin (porcine) injection 5,000 Units  5,000 Units SubCUTAneous Q8H Sheryl Varner MD   5,000 Units at 05/30/19 1431    insulin lispro (HUMALOG) injection   SubCUTAneous AC&HS Sheryl Varner MD   Stopped at 05/30/19 1648    glucose chewable tablet 16 g  4 Tab Oral PRN Sheryl Varner MD   16 g at 05/30/19 0758    glucagon (GLUCAGEN) injection 1 mg  1 mg IntraMUSCular PRN Sheryl Varner MD        dextrose (D50) infusion 12.5-25 g  25-50 mL IntraVENous PRN Sheryl Varner MD        VANCOMYCIN INFORMATION NOTE   Other Rx Dosing/Monitoring Sheryl Varner MD        piperacillin-tazobactam (ZOSYN) 3.375 g in 0.9% sodium chloride (MBP/ADV) 100 mL \"Extended 4 Hours Infusion ####\"   3.375 g IntraVENous Q8H Sheryl Varner MD 25 mL/hr at 05/30/19 1648 3.375 g at 05/30/19 1648    insulin glargine (LANTUS) injection 10 Units  10 Units SubCUTAneous DAILY Sheryl Varner MD   10 Units at 05/30/19 0900      Allergies   Allergen Reactions    Contrast Agent [Iodine] Rash        Review of Systems:      Constitutional: Negative for chills and fever. He denies chronic fatigue, weight loss,depression, nervousness, panic attacks. HEENT: Negative for congestion, rhinorrhea and sore throat.  He denies ringing in ears,  dizzy spells, glaucoma, sinus trouble,  Respiratory: Denies for cough and shortness of breath, hemoptysis    Cardiovascular: Denies chest pain, irregular heart beat.   Gastrointestinal: Negative for abdominal pain, blood in stool, constipation, diarrhea, nausea and vomiting. Genitourinary: Negative for dysuria, frequency and hematuria. Musculoskeletal: Negative for back pain and myalgias.    Skin: Positive for RIGHT foot with necrotic tissue distal lateral 5th metatarsal head both plantar ulcer and lateral  Negative for rash. Neurological: Negative for dizziness and headaches        Objective:     Patient Vitals for the past 8 hrs:   BP Temp Pulse Resp SpO2   19 1520 107/73 98.5 °F (36.9 °C) 98 16 98 %   19 1138 143/84 98.7 °F (37.1 °C) 88 16 98 %     Temp (24hrs), Av °F (36.7 °C), Min:97.3 °F (36.3 °C), Max:98.7 °F (37.1 °C)      Physical Exam:    Mr. Marisabel Javier is 38 y/o  male who is pleasant, alert and oriented x3, in no apparent distress. Patient is well-developed and nourished, with good attention to hygiene and body habitus.     Lower Extremity Exam: .   Left: 5th metatarsal  with no odor present. There are two ulcers lateral and sub 5th met heads. There is some  diffuse erythema and mild edema.  NO active weeping / drainage.     VASCULAR EXAM:   Pedal pulses: intact 2/4 D/P and P/T. Skin temperature is warm to warm right and left foot. Digital capillary fill time is 3sec right and left foot.      Neurological Exam:   Light touch protective sensation is absent to both feet. There is noted Loss of protective sensation. There are no Tinel's or 's signs present to the nerves crossing the ankle joint.     MUSCULOSKELETAL EXAM:.   Muscle tone is normal for age and situation. Muscle strength of the flexor and extensor group inversion and eversion Bilateral 5/5.      MYCOTIC NAIL:.   Dystrophic nail 1,2,3,4,5 bilateral. Elongated thickened nails 1,2,3,4,5 bilateral Hypertrophic nails 1,2,3,4,5     DERMATOLOGICAL EXAM:.   Skin is of abnormal texture and turgor with some atrophic skin changes noting decreased hair growth, nail changes (thickening), pigmentary changes, skin texture (thin,shiney), skin color (rubor, red) . R/L Bilateral. There is diffuse xerosis.  There is noted subungual debris     RADIOGRAPHIC FINDINGS:.  (see report for details)  lytic changes of the fifth distal metatarsal compatible with osteomyelitis      Wound Presentation:  Wound Foot Right;Plantar;Medial (Active)   Number of days: 224       Wound Foot Right;Plantar;Lateral (Active)   Number of days: 224        Lab Review:   Recent Results (from the past 24 hour(s))   GLUCOSE, POC    Collection Time: 05/29/19  9:35 PM   Result Value Ref Range    Glucose (POC) 231 (H) 70 - 072 mg/dL   METABOLIC PANEL, BASIC    Collection Time: 05/30/19  5:30 AM   Result Value Ref Range    Sodium 139 136 - 145 mmol/L    Potassium 4.0 3.5 - 5.5 mmol/L    Chloride 105 100 - 108 mmol/L    CO2 27 21 - 32 mmol/L    Anion gap 7 3.0 - 18 mmol/L    Glucose 29 (LL) 74 - 99 mg/dL    BUN 11 7.0 - 18 MG/DL    Creatinine 1.00 0.6 - 1.3 MG/DL    BUN/Creatinine ratio 11 (L) 12 - 20      GFR est AA >60 >60 ml/min/1.73m2    GFR est non-AA >60 >60 ml/min/1.73m2    Calcium 8.2 (L) 8.5 - 10.1 MG/DL   CBC WITH AUTOMATED DIFF    Collection Time: 05/30/19  5:30 AM   Result Value Ref Range    WBC 4.3 (L) 4.6 - 13.2 K/uL    RBC 3.43 (L) 4.70 - 5.50 M/uL    HGB 10.1 (L) 13.0 - 16.0 g/dL    HCT 32.0 (L) 36.0 - 48.0 %    MCV 93.3 74.0 - 97.0 FL    MCH 29.4 24.0 - 34.0 PG    MCHC 31.6 31.0 - 37.0 g/dL    RDW 12.6 11.6 - 14.5 %    PLATELET 491 462 - 373 K/uL    MPV 9.9 9.2 - 11.8 FL    NEUTROPHILS 52 40 - 73 %    LYMPHOCYTES 36 21 - 52 %    MONOCYTES 9 3 - 10 %    EOSINOPHILS 2 0 - 5 %    BASOPHILS 1 0 - 2 %    ABS. NEUTROPHILS 2.2 1.8 - 8.0 K/UL    ABS. LYMPHOCYTES 1.6 0.9 - 3.6 K/UL    ABS. MONOCYTES 0.4 0.05 - 1.2 K/UL    ABS. EOSINOPHILS 0.1 0.0 - 0.4 K/UL    ABS.  BASOPHILS 0.0 0.0 - 0.1 K/UL    DF AUTOMATED     MAGNESIUM    Collection Time: 05/30/19  5:30 AM   Result Value Ref Range    Magnesium 2.2 1.6 - 2.6 mg/dL   PHOSPHORUS    Collection Time: 05/30/19  5:30 AM   Result Value Ref Range    Phosphorus 4.0 2.5 - 4.9 MG/DL   HEPATIC FUNCTION PANEL    Collection Time: 05/30/19  5:30 AM   Result Value Ref Range    Protein, total 6.8 6.4 - 8.2 g/dL    Albumin 3.0 (L) 3.4 - 5.0 g/dL Globulin 3.8 2.0 - 4.0 g/dL    A-G Ratio 0.8 0.8 - 1.7      Bilirubin, total 0.7 0.2 - 1.0 MG/DL    Bilirubin, direct 0.2 0.0 - 0.2 MG/DL    Alk. phosphatase 59 45 - 117 U/L    AST (SGOT) 22 15 - 37 U/L    ALT (SGPT) 18 16 - 61 U/L   GLUCOSE, POC    Collection Time: 05/30/19  7:26 AM   Result Value Ref Range    Glucose (POC) 58 (L) 70 - 110 mg/dL   GLUCOSE, POC    Collection Time: 05/30/19  7:51 AM   Result Value Ref Range    Glucose (POC) 78 70 - 110 mg/dL   GLUCOSE, POC    Collection Time: 05/30/19  8:16 AM   Result Value Ref Range    Glucose (POC) 118 (H) 70 - 110 mg/dL   GLUCOSE, POC    Collection Time: 05/30/19 11:31 AM   Result Value Ref Range    Glucose (POC) 309 (H) 70 - 110 mg/dL   GLUCOSE, POC    Collection Time: 05/30/19  4:19 PM   Result Value Ref Range    Glucose (POC) 138 (H) 70 - 110 mg/dL       Impression:     NON healing ulcer of the right foot 5th metatarsal.     Recommendation:     Discussed with the patient all the above findings. Reviewed surgical treatment options pending MRI findings. Will order [address]    I would like to thank Dr. Kindra Morales and nursing staff for assistance in the team approach and care for the patient.     Signed By: Dr Vivian Martínez and Ankle  C) [phone]    May 30, 2019

## 2019-05-31 ENCOUNTER — APPOINTMENT (OUTPATIENT)
Dept: MRI IMAGING | Age: 44
DRG: 854 | End: 2019-05-31
Attending: PODIATRIST
Payer: MEDICARE

## 2019-05-31 LAB
ANION GAP SERPL CALC-SCNC: 7 MMOL/L (ref 3–18)
BACTERIA SPEC CULT: ABNORMAL
BASOPHILS # BLD: 0 K/UL (ref 0–0.1)
BASOPHILS NFR BLD: 1 % (ref 0–2)
BUN SERPL-MCNC: 12 MG/DL (ref 7–18)
BUN/CREAT SERPL: 10 (ref 12–20)
CALCIUM SERPL-MCNC: 8.1 MG/DL (ref 8.5–10.1)
CHLORIDE SERPL-SCNC: 101 MMOL/L (ref 100–108)
CO2 SERPL-SCNC: 29 MMOL/L (ref 21–32)
CREAT SERPL-MCNC: 1.24 MG/DL (ref 0.6–1.3)
DIFFERENTIAL METHOD BLD: ABNORMAL
EOSINOPHIL # BLD: 0.1 K/UL (ref 0–0.4)
EOSINOPHIL NFR BLD: 3 % (ref 0–5)
ERYTHROCYTE [DISTWIDTH] IN BLOOD BY AUTOMATED COUNT: 12.6 % (ref 11.6–14.5)
GLUCOSE BLD STRIP.AUTO-MCNC: 146 MG/DL (ref 70–110)
GLUCOSE BLD STRIP.AUTO-MCNC: 154 MG/DL (ref 70–110)
GLUCOSE BLD STRIP.AUTO-MCNC: 253 MG/DL (ref 70–110)
GLUCOSE BLD STRIP.AUTO-MCNC: 306 MG/DL (ref 70–110)
GLUCOSE SERPL-MCNC: 257 MG/DL (ref 74–99)
GRAM STN SPEC: ABNORMAL
GRAM STN SPEC: ABNORMAL
HCT VFR BLD AUTO: 32.1 % (ref 36–48)
HGB BLD-MCNC: 9.9 G/DL (ref 13–16)
LYMPHOCYTES # BLD: 1.5 K/UL (ref 0.9–3.6)
LYMPHOCYTES NFR BLD: 41 % (ref 21–52)
MAGNESIUM SERPL-MCNC: 2 MG/DL (ref 1.6–2.6)
MCH RBC QN AUTO: 28.6 PG (ref 24–34)
MCHC RBC AUTO-ENTMCNC: 30.8 G/DL (ref 31–37)
MCV RBC AUTO: 92.8 FL (ref 74–97)
MONOCYTES # BLD: 0.4 K/UL (ref 0.05–1.2)
MONOCYTES NFR BLD: 11 % (ref 3–10)
NEUTS SEG # BLD: 1.6 K/UL (ref 1.8–8)
NEUTS SEG NFR BLD: 44 % (ref 40–73)
PHOSPHATE SERPL-MCNC: 3.6 MG/DL (ref 2.5–4.9)
PLATELET # BLD AUTO: 346 K/UL (ref 135–420)
PMV BLD AUTO: 10.5 FL (ref 9.2–11.8)
POTASSIUM SERPL-SCNC: 4.5 MMOL/L (ref 3.5–5.5)
RBC # BLD AUTO: 3.46 M/UL (ref 4.7–5.5)
SERVICE CMNT-IMP: ABNORMAL
SODIUM SERPL-SCNC: 137 MMOL/L (ref 136–145)
VANCOMYCIN TROUGH SERPL-MCNC: 19.7 UG/ML (ref 10–20)
WBC # BLD AUTO: 3.5 K/UL (ref 4.6–13.2)

## 2019-05-31 PROCEDURE — 84100 ASSAY OF PHOSPHORUS: CPT

## 2019-05-31 PROCEDURE — 74011000258 HC RX REV CODE- 258: Performed by: INTERNAL MEDICINE

## 2019-05-31 PROCEDURE — 36415 COLL VENOUS BLD VENIPUNCTURE: CPT

## 2019-05-31 PROCEDURE — 65660000000 HC RM CCU STEPDOWN

## 2019-05-31 PROCEDURE — 80202 ASSAY OF VANCOMYCIN: CPT

## 2019-05-31 PROCEDURE — 74011250636 HC RX REV CODE- 250/636: Performed by: INTERNAL MEDICINE

## 2019-05-31 PROCEDURE — 74011250637 HC RX REV CODE- 250/637: Performed by: INTERNAL MEDICINE

## 2019-05-31 PROCEDURE — 74011636637 HC RX REV CODE- 636/637: Performed by: INTERNAL MEDICINE

## 2019-05-31 PROCEDURE — 85025 COMPLETE CBC W/AUTO DIFF WBC: CPT

## 2019-05-31 PROCEDURE — 80048 BASIC METABOLIC PNL TOTAL CA: CPT

## 2019-05-31 PROCEDURE — 83735 ASSAY OF MAGNESIUM: CPT

## 2019-05-31 PROCEDURE — 82962 GLUCOSE BLOOD TEST: CPT

## 2019-05-31 PROCEDURE — 73718 MRI LOWER EXTREMITY W/O DYE: CPT

## 2019-05-31 RX ORDER — INSULIN GLARGINE 100 [IU]/ML
6 INJECTION, SOLUTION SUBCUTANEOUS 2 TIMES DAILY
Status: DISCONTINUED | OUTPATIENT
Start: 2019-06-01 | End: 2019-05-31

## 2019-05-31 RX ORDER — INSULIN GLARGINE 100 [IU]/ML
6 INJECTION, SOLUTION SUBCUTANEOUS 2 TIMES DAILY
Status: DISCONTINUED | OUTPATIENT
Start: 2019-06-01 | End: 2019-06-03

## 2019-05-31 RX ORDER — INSULIN GLARGINE 100 [IU]/ML
6 INJECTION, SOLUTION SUBCUTANEOUS 2 TIMES DAILY
Status: DISCONTINUED | OUTPATIENT
Start: 2019-05-31 | End: 2019-05-31

## 2019-05-31 RX ADMIN — SODIUM CHLORIDE 1250 MG: 900 INJECTION, SOLUTION INTRAVENOUS at 14:13

## 2019-05-31 RX ADMIN — GABAPENTIN 300 MG: 100 CAPSULE ORAL at 09:10

## 2019-05-31 RX ADMIN — INSULIN LISPRO 3 UNITS: 100 INJECTION, SOLUTION INTRAVENOUS; SUBCUTANEOUS at 23:48

## 2019-05-31 RX ADMIN — PIPERACILLIN SODIUM,TAZOBACTAM SODIUM 3.38 G: 3; .375 INJECTION, POWDER, FOR SOLUTION INTRAVENOUS at 17:40

## 2019-05-31 RX ADMIN — SODIUM CHLORIDE 125 ML/HR: 450 INJECTION, SOLUTION INTRAVENOUS at 07:38

## 2019-05-31 RX ADMIN — GABAPENTIN 300 MG: 100 CAPSULE ORAL at 17:40

## 2019-05-31 RX ADMIN — Medication 1 TABLET: at 09:10

## 2019-05-31 RX ADMIN — INSULIN LISPRO 9 UNITS: 100 INJECTION, SOLUTION INTRAVENOUS; SUBCUTANEOUS at 12:06

## 2019-05-31 RX ADMIN — PIPERACILLIN SODIUM,TAZOBACTAM SODIUM 3.38 G: 3; .375 INJECTION, POWDER, FOR SOLUTION INTRAVENOUS at 01:05

## 2019-05-31 RX ADMIN — INSULIN LISPRO 12 UNITS: 100 INJECTION, SOLUTION INTRAVENOUS; SUBCUTANEOUS at 09:08

## 2019-05-31 RX ADMIN — GABAPENTIN 300 MG: 100 CAPSULE ORAL at 23:48

## 2019-05-31 RX ADMIN — HEPARIN SODIUM 5000 UNITS: 5000 INJECTION INTRAVENOUS; SUBCUTANEOUS at 14:09

## 2019-05-31 RX ADMIN — HEPARIN SODIUM 5000 UNITS: 5000 INJECTION INTRAVENOUS; SUBCUTANEOUS at 06:14

## 2019-05-31 RX ADMIN — SIMVASTATIN 40 MG: 40 TABLET, FILM COATED ORAL at 23:48

## 2019-05-31 RX ADMIN — PIPERACILLIN SODIUM,TAZOBACTAM SODIUM 3.38 G: 3; .375 INJECTION, POWDER, FOR SOLUTION INTRAVENOUS at 09:12

## 2019-05-31 RX ADMIN — ASPIRIN 81 MG 81 MG: 81 TABLET ORAL at 09:10

## 2019-05-31 RX ADMIN — SODIUM CHLORIDE 1250 MG: 900 INJECTION, SOLUTION INTRAVENOUS at 04:40

## 2019-05-31 RX ADMIN — INSULIN GLARGINE 10 UNITS: 100 INJECTION, SOLUTION SUBCUTANEOUS at 09:07

## 2019-05-31 NOTE — PROGRESS NOTES
Problem: Pain  Goal: *Control of Pain  5/31/2019 1958 by Lydia ROBLERO  Outcome: Progressing Towards Goal  5/31/2019 1953 by Lydia Witt  Outcome: Progressing Towards Goal     Problem: Patient Education: Go to Patient Education Activity  Goal: Patient/Family Education  5/31/2019 1958 by Lydia ROBLERO  Outcome: Progressing Towards Goal  5/31/2019 1953 by Lydia Witt  Outcome: Progressing Towards Goal     Problem: Falls - Risk of  Goal: *Absence of falls  5/31/2019 1958 by Lydia ROBLERO  Outcome: Progressing Towards Goal  5/31/2019 1953 by Lydia Witt  Outcome: Progressing Towards Goal  Goal: *Knowledge of fall prevention  5/31/2019 1958 by Lydia ROBLERO  Outcome: Progressing Towards Goal  5/31/2019 1953 by Lydia Witt  Outcome: Progressing Towards Goal     Problem: Diabetes Self-Management  Goal: *Disease process and treatment process  Description  Define diabetes and identify own type of diabetes; list 3 options for treating diabetes. 5/31/2019 1958 by Lydia ROBLERO  Outcome: Progressing Towards Goal  5/31/2019 1953 by Lydia Witt  Outcome: Progressing Towards Goal  Goal: *Incorporating nutritional management into lifestyle  Description  Describe effect of type, amount and timing of food on blood glucose; list 3 methods for planning meals. 5/31/2019 1958 by Lydia ROBLERO  Outcome: Progressing Towards Goal  5/31/2019 1953 by Lydia Witt  Outcome: Progressing Towards Goal  Goal: *Incorporating physical activity into lifestyle  Description  State effect of exercise on blood glucose levels.   5/31/2019 1958 by Lydia ROBLERO  Outcome: Progressing Towards Goal  5/31/2019 1953 by Lydia Witt  Outcome: Progressing Towards Goal  Goal: *Developing strategies to promote health/change behavior  Description  Define the ABC's of diabetes; identify appropriate screenings, schedule and personal plan for screenings. 5/31/2019 1958 by Ted ROBLERO  Outcome: Progressing Towards Goal  5/31/2019 1953 by Ted Keller  Outcome: Progressing Towards Goal  Goal: *Using medications safely  Description  State effect of diabetes medications on diabetes; name diabetes medication taking, action and side effects. 5/31/2019 1958 by Ted Keller N  Outcome: Progressing Towards Goal  5/31/2019 1953 by Ted Keller  Outcome: Progressing Towards Goal  Goal: *Monitoring blood glucose, interpreting and using results  Description  Identify recommended blood glucose targets  and personal targets. 5/31/2019 1958 by Ted ROBLERO  Outcome: Progressing Towards Goal  5/31/2019 1953 by Ted Keller  Outcome: Progressing Towards Goal  Goal: *Prevention, detection, treatment of acute complications  Description  List symptoms of hyper- and hypoglycemia; describe how to treat low blood sugar and actions for lowering  high blood glucose level. 5/31/2019 1958 by Ted ROBLERO  Outcome: Progressing Towards Goal  5/31/2019 1953 by Ted Keller  Outcome: Progressing Towards Goal  Goal: *Prevention, detection and treatment of chronic complications  Description  Define the natural course of diabetes and describe the relationship of blood glucose levels to long term complications of diabetes.   5/31/2019 1958 by Ted ROBLERO  Outcome: Progressing Towards Goal  5/31/2019 1953 by Ted Keller  Outcome: Progressing Towards Goal  Goal: *Developing strategies to address psychosocial issues  Description  Describe feelings about living with diabetes; identify support needed and support network  5/31/2019 1958 by Ted Keller  Outcome: Progressing Towards Goal  5/31/2019 1953 by Ted Keller  Outcome: Progressing Towards Goal  Goal: *Insulin pump training  5/31/2019 1958 by Ted ROBLERO  Outcome: Progressing Towards Goal  5/31/2019 1953 by Sarai Hope Marina Nunes N  Outcome: Progressing Towards Goal  Goal: *Sick day guidelines  5/31/2019 1958 by Climmie Ache N  Outcome: Progressing Towards Goal  5/31/2019 1953 by Climmie Ache  Outcome: Progressing Towards Goal  Goal: *Patient Specific Goal (EDIT GOAL, INSERT TEXT)  5/31/2019 1958 by Climmie Ache N  Outcome: Progressing Towards Goal  5/31/2019 1953 by Climmie Ache  Outcome: Progressing Towards Goal     Problem: Patient Education: Go to Patient Education Activity  Goal: Patient/Family Education  5/31/2019 1958 by Climmie Ache N  Outcome: Progressing Towards Goal  5/31/2019 1953 by Climmie Ache  Outcome: Progressing Towards Goal     Problem: Discharge Planning  Goal: *Discharge to safe environment  5/31/2019 1958 by Climmie Ache  Outcome: Progressing Towards Goal  5/31/2019 1953 by Climmie Ache  Outcome: Progressing Towards Goal

## 2019-05-31 NOTE — PROGRESS NOTES
Problem: Pain  Goal: *Control of Pain  Outcome: Progressing Towards Goal     Problem: Patient Education: Go to Patient Education Activity  Goal: Patient/Family Education  Outcome: Progressing Towards Goal     Problem: Falls - Risk of  Goal: *Absence of falls  Outcome: Progressing Towards Goal  Goal: *Knowledge of fall prevention  Outcome: Progressing Towards Goal     Problem: Diabetes Self-Management  Goal: *Disease process and treatment process  Description  Define diabetes and identify own type of diabetes; list 3 options for treating diabetes. Outcome: Progressing Towards Goal  Goal: *Incorporating nutritional management into lifestyle  Description  Describe effect of type, amount and timing of food on blood glucose; list 3 methods for planning meals. Outcome: Progressing Towards Goal  Goal: *Incorporating physical activity into lifestyle  Description  State effect of exercise on blood glucose levels. Outcome: Progressing Towards Goal  Goal: *Developing strategies to promote health/change behavior  Description  Define the ABC's of diabetes; identify appropriate screenings, schedule and personal plan for screenings. Outcome: Progressing Towards Goal  Goal: *Using medications safely  Description  State effect of diabetes medications on diabetes; name diabetes medication taking, action and side effects. Outcome: Progressing Towards Goal  Goal: *Monitoring blood glucose, interpreting and using results  Description  Identify recommended blood glucose targets  and personal targets. Outcome: Progressing Towards Goal  Goal: *Prevention, detection, treatment of acute complications  Description  List symptoms of hyper- and hypoglycemia; describe how to treat low blood sugar and actions for lowering  high blood glucose level.   Outcome: Progressing Towards Goal  Goal: *Prevention, detection and treatment of chronic complications  Description  Define the natural course of diabetes and describe the relationship of blood glucose levels to long term complications of diabetes.   Outcome: Progressing Towards Goal  Goal: *Developing strategies to address psychosocial issues  Description  Describe feelings about living with diabetes; identify support needed and support network  Outcome: Progressing Towards Goal  Goal: *Insulin pump training  Outcome: Progressing Towards Goal  Goal: *Sick day guidelines  Outcome: Progressing Towards Goal  Goal: *Patient Specific Goal (EDIT GOAL, INSERT TEXT)  Outcome: Progressing Towards Goal     Problem: Patient Education: Go to Patient Education Activity  Goal: Patient/Family Education  Outcome: Progressing Towards Goal     Problem: Discharge Planning  Goal: *Discharge to safe environment  Outcome: Progressing Towards Goal

## 2019-05-31 NOTE — PROGRESS NOTES
Radiologist preliminary report:    MRI right foot  T1 signal changes in the distal fifth metatarsal and fifth digit proximal phalanx with associated soft tissue edema are supportive of osteomyelitis. Full report to follow. Preliminary findings documented in PACS and Epic.

## 2019-05-31 NOTE — PROGRESS NOTES
Infectious Disease Follow-up Note      Date of Admission: 5/29/2019     Date of Note:  5/31/2019    Will plan to check back on Monday 6/3 but will be available by phone this weekend. I can be reached at [phone] if needed. Summary:     38 y/o AAM w/ DM, HTN chronic R lateral plantar ulcer admitted w/new dorsolateral R foot ulcer and OM 5th MT on xray. Transient hypotension promptly responsive to fluids - not septic. Interval History:     Remains afebrile. Tolerating antibiotics. No new complaints. Being taken down for MRI    Current Antimicrobials: Prior Antimicrobials    [place]com, zosyn 5/29 - 2        Assessment / Plan:      Osteomyelitis, right 5th metatarsal  - acute. Secondary to new dorsolateral ulcer  - xray 5/29: lytic changes distal 5th metatarsal -> continue current abx  -> Podiatry awaiting MRI, plans possible surgery tomorrow. If all infected bone successfully removed, 5 days abx post-op   Transient Hypotension  - promptly resolved with IVF. Likely dehydration. Does not appear septic.  Now hypertensive -> monitor   Chronic non-healing ulcer, lateral plantar, R foot     DM     HTN        Microbiology:   5/29      blcx IP x2     Lines / Catheters:   piv    Patient Active Problem List   Diagnosis Code    Right foot ulcer, with necrosis of muscle ([address] L97.513    Diabetic polyneuropathy ([address] E11.42    Right foot ulcer, with fat layer exposed ([address] L97.512    Osteomyelitis of right foot ([address] M86.9    Sepsis ([address] A41.9    Left arm weakness R29.898       Current Facility-Administered Medications   Medication Dose Route Frequency    [START ON 6/1/2019] insulin glargine (LANTUS) injection 6 Units  6 Units SubCUTAneous BID    [START ON 6/2/2019] VANCOMYCIN INFORMATION NOTE   Other ONCE    vancomycin (VANCOCIN) 1,250 mg in 0.9% sodium chloride 250 mL IVPB  1,250 mg IntraVENous Q12H    sodium chloride (NS) flush 5-10 mL  5-10 mL IntraVENous PRN    aspirin chewable tablet 81 mg  81 mg Oral DAILY    calcium-vitamin D 600 mg(1,500mg) -200 unit per tablet 1 Tab  1 Tab Oral DAILY    gabapentin (NEURONTIN) capsule 300 mg  300 mg Oral TID    simvastatin (ZOCOR) tablet 40 mg  40 mg Oral QHS    0.45% sodium chloride infusion  75 mL/hr IntraVENous CONTINUOUS    heparin (porcine) injection 5,000 Units  5,000 Units SubCUTAneous Q8H    insulin lispro (HUMALOG) injection   SubCUTAneous AC&HS    glucose chewable tablet 16 g  4 Tab Oral PRN    glucagon (GLUCAGEN) injection 1 mg  1 mg IntraMUSCular PRN    dextrose (D50) infusion 12.5-25 g  25-50 mL IntraVENous PRN    VANCOMYCIN INFORMATION NOTE   Other Rx Dosing/Monitoring    piperacillin-tazobactam (ZOSYN) 3.375 g in 0.9% sodium chloride (MBP/ADV) 100 mL \"Extended 4 Hours Infusion ####\"   3.375 g IntraVENous Q8H       Objective:     Visit Vitals  BP 93/55 (BP 1 Location: Left arm, BP Patient Position: At rest)   Pulse 88   Temp 98.3 °F (36.8 °C)   Resp 18   Ht 5' 6\" (1.676 m)   Wt 68 kg (150 lb)   SpO2 98%   BMI 24.21 kg/m²       Temp (24hrs), Av.1 °F (36.7 °C), Min:97.7 °F (36.5 °C), Max:98.3 °F (36.8 °C)      General: Well developed, well nourished 39 y.o.  male in no acute distress. ENT: ENT exam normal, no neck nodes or sinus tenderness  Head: normocephalic, without obvious abnormality  Mouth:  mucous membranes moist, pharynx normal without lesions  Neck: supple, symmetrical, trachea midline   Cardio:  regular rate and rhythm, S1, S2 normal, no murmur, click, rub or gallop  Chest: inspection normal - no chest wall deformities or tenderness, respiratory effort normal  Lungs: clear to auscultation, no wheezes or rales and unlabored breathing  Abdomen: soft, non-tender. Bowel sounds normal. No masses, no organomegaly.   Extremities:  no redness or tenderness in the calves or thighs, no edema, new ulcer right dorsolateral foot, chronic plantar ulcer at level of  5th MT  Neuro: Grossly normal        Lab results Chemistry  Recent Labs     05/31/19 0445 05/30/19  0530 05/29/19  1215   * 29* 77    139 139   K 4.5 4.0 3.8    105 99*   CO2 29 27 31   BUN 12 11 20*   CREA 1.24 1.00 1.83*   CA 8.1* 8.2* 10.4*   AGAP 7 7 9   BUCR 10* 11* 11*   AP  --  59 78   TP  --  6.8 8.9*   ALB  --  3.0* 4.0   GLOB  --  3.8 4.9*   AGRAT  --  0.8 0.8       CBC w/ Diff  Recent Labs     05/31/19 0445 05/30/19  0530 05/29/19  1215   WBC 3.5* 4.3* 5.2   RBC 3.46* 3.43* 4.01*   HGB 9.9* 10.1* 11.6*   HCT 32.1* 32.0* 36.7    368 459*   GRANS 44 52 60   LYMPH 41 36 33   EOS 3 2 1       Microbiology  All Micro Results     Procedure Component Value Units Date/Time    CULTURE, BLOOD [[id number]]  (Abnormal) Collected:  05/29/19 1215    Order Status:  Completed Specimen:  Blood Updated:  05/31/19 0732     Special Requests: PERIPHERAL        GRAM STAIN       ANAEROBIC BOTTLE GRAM POSITIVE COCCI IN CHAINS IN PAIRS                  SMEAR CALLED TO AND CORRECTLY REPEATED BY: Misael Benton RN,3000, ON 5/30/19 AT 0329 TO JWS           Culture result:       ANAEROBIC BOTTLE STREPTOCOCCI, BETA HEMOLYTIC GROUP C          CULTURE, BLOOD [[id number]] Collected:  05/29/19 1225    Order Status:  Completed Specimen:  Blood Updated:  05/31/19 0727     Special Requests: PERIPHERAL        Culture result: NO GROWTH 2 DAYS                Dominique Sykes MD, [facility]  Infectious Disease Specialist  Pager [phone]

## 2019-05-31 NOTE — DIABETES MGMT
Glycemic Control Plan of Care    Recommend Lantus 6 units BID         Georges Carlisle MPH RN  Pager [phone]  Office [phone]

## 2019-05-31 NOTE — ANCILLARY DISCHARGE INSTRUCTIONS
Patient and/or next of kin has been given the [facility] Important Message From Medicare About Your Rights\" letter and all questions were answered. Paper copy signed.

## 2019-05-31 NOTE — PROGRESS NOTES
Podiatry Surgery Progress Note    Patient: Esther Morales MRN: [id number] LOS: 2     YOB: 1975  Age: 39 y.o. Sex: male        Admit Date: 5/29/2019    POD * No surgery found *  Procedure:   * No surgery found *        Subjective:   Mr Brie Sultana  is a 39 y.o. male with DM, HTN. Who seen this morning at bedside stating over all feeling better. He was admitted for further work up for right foot NON healing ulcers lateral and sub 5th met head despite prior treatment.      Review of Systems:    Constitutional: Negative for chills and fever. He denies chronic fatigue, weight loss,depression, nervousness, panic attacks. HEENT: Negative for congestion, rhinorrhea and sore throat.  He denies ringing in ears,  dizzy spells, glaucoma, sinus trouble,  Respiratory: Denies for cough and shortness of breath, hemoptysis    Cardiovascular: Denies chest pain, irregular heart beat.   Gastrointestinal: Negative for abdominal pain, blood in stool, constipation, diarrhea, nausea and vomiting. Genitourinary: Negative for dysuria, frequency and hematuria. Musculoskeletal: Negative for back pain and myalgias. Skin: Positive for RIGHT foot with necrotic tissue distal lateral 5th metatarsal head both plantar ulcer and lateral  Negative for rash. Neurological: Negative for dizziness and headaches    Objective:     Vitals  Patient Vitals for the past 8 hrs:   BP Temp Pulse Resp SpO2   05/31/19 0731 114/70 98.1 °F (36.7 °C) 83 18 96 %   05/31/19 0357 103/66 98.1 °F (36.7 °C) 86 18 96 %   05/31/19 0025 134/90 97.7 °F (36.5 °C) 86 16 99 %       I/O Current Shift  No intake/output data recorded.     I/O Last Three Shifts  05/29 1901 - 05/31 0700  In: 1125 [I.V.:1125]  Out: 2800 [Urine:2800]      Data Review  Recent Results (from the past 24 hour(s))   GLUCOSE, POC    Collection Time: 05/30/19  7:51 AM   Result Value Ref Range    Glucose (POC) 78 70 - 110 mg/dL   GLUCOSE, POC    Collection Time: 05/30/19  8:16 AM   Result Value Ref Range    Glucose (POC) 118 (H) 70 - 110 mg/dL   GLUCOSE, POC    Collection Time: 05/30/19 11:31 AM   Result Value Ref Range    Glucose (POC) 309 (H) 70 - 110 mg/dL   GLUCOSE, POC    Collection Time: 05/30/19  4:19 PM   Result Value Ref Range    Glucose (POC) 138 (H) 70 - 110 mg/dL   GLUCOSE, POC    Collection Time: 05/30/19 10:41 PM   Result Value Ref Range    Glucose (POC) 265 (H) 70 - 110 mg/dL   CBC WITH AUTOMATED DIFF    Collection Time: 05/31/19  4:45 AM   Result Value Ref Range    WBC 3.5 (L) 4.6 - 13.2 K/uL    RBC 3.46 (L) 4.70 - 5.50 M/uL    HGB 9.9 (L) 13.0 - 16.0 g/dL    HCT 32.1 (L) 36.0 - 48.0 %    MCV 92.8 74.0 - 97.0 FL    MCH 28.6 24.0 - 34.0 PG    MCHC 30.8 (L) 31.0 - 37.0 g/dL    RDW 12.6 11.6 - 14.5 %    PLATELET 252 767 - 198 K/uL    MPV 10.5 9.2 - 11.8 FL    NEUTROPHILS 44 40 - 73 %    LYMPHOCYTES 41 21 - 52 %    MONOCYTES 11 (H) 3 - 10 %    EOSINOPHILS 3 0 - 5 %    BASOPHILS 1 0 - 2 %    ABS. NEUTROPHILS 1.6 (L) 1.8 - 8.0 K/UL    ABS. LYMPHOCYTES 1.5 0.9 - 3.6 K/UL    ABS. MONOCYTES 0.4 0.05 - 1.2 K/UL    ABS. EOSINOPHILS 0.1 0.0 - 0.4 K/UL    ABS. BASOPHILS 0.0 0.0 - 0.1 K/UL    DF AUTOMATED         Physical Exam:    Sergio Sen  is a 39 y.o. male. Pleasant, alert and oriented x3, in no apparent distress. Patient is well-developed and nourished, with good attention to hygiene and body habitus. Mood and affect normal, appropriate to situation. Lower Extremity Exam: .   Left: 5th metatarsal  with no odor present. There are two ulcers lateral and sub 5th met heads. There is some  diffuse erythema and mild edema.  NO active weeping / drainage.     VASCULAR EXAM:   Pedal pulses: intact 2/4 D/P and P/T. Skin temperature is warm to warm right and left foot. Digital capillary fill time is 3sec right and left foot.      Neurological Exam:   Light touch protective sensation is absent to both feet. There is noted Loss of protective sensation. There are no Tinel's or 's signs present to the nerves crossing the ankle joint.     MUSCULOSKELETAL EXAM:.   Muscle tone is normal for age and situation. Muscle strength of the flexor and extensor group inversion and eversion Bilateral 5/5.      MYCOTIC NAIL:.   Dystrophic nail 1,2,3,4,5 bilateral. Elongated thickened nails 1,2,3,4,5 bilateral Hypertrophic nails 1,2,3,4,5     DERMATOLOGICAL EXAM:.   Skin is of abnormal texture and turgor with some atrophic skin changes noting decreased hair growth, nail changes (thickening), pigmentary changes, skin texture (thin,shiney), skin color (rubor, red) . R/L Bilateral. There is diffuse xerosis. There is noted subungual debris     RADIOGRAPHIC FINDINGS:.  (see report for details)  lytic changes of the fifth distal metatarsal compatible with  osteomyelitis         Wound Presentation:  Wound Foot Right;Plantar;Medial (Active)   Number of days: 225       Wound Foot Right;Plantar;Lateral (Active)   Number of days: 225          Assessment:     Principal Problem:    Sepsis ([address] (5/29/2019)    Active Problems:    Right foot ulcer, with necrosis of muscle ([address] (10/18/2018)      Osteomyelitis of right foot ([address] (5/29/2019)      Left arm weakness (5/29/2019)      NON healing ulcer right foot     Plan/Recommendations/Medical Decision Making:       Discussed with the patient all the above findings. Reviewed surgical treatment options pending MRI findings. Pending MRI findings possible surgical intervention tomorrow. All questions were answered and he indicated that he understood.      Continue  LWC and IVabx.      MRI pending.     Dr Aure [facility]  C) [phone]  5/31/2019  7:40 AM

## 2019-05-31 NOTE — PROGRESS NOTES
Internal Medicine Progress Note        NAME: Flory Hardin   :  1975  MRM:  [id number]    Date/Time: 2019        ASSESSMENT/PLAN: pending MRI. Reduce IVF. Anticipate foot surgery     #  Sepsis ([facility] [address] (2019) , likely from foot infection. Iv vanco and zosyn. Chemistry not available yet. Blood and ulcer CS. ID consulted. Tele bed. Maintain vital signs. IVF. Lactic acid. Presented with hypotension and tachycardia     # Dehydration. IVF. Strict I/O      #   Right foot ulcer, with necrosis of muscle ([facility] [address] (10/18/2018). Podiatrist consulted. Wound care     #   Osteomyelitis of right foot ([facility] [address] (2019), possible OM. Possible the source of the infection. ID consulted. As above.     #   Left arm weakness (2019) from previous injury in his neck. Stable.      # DM . Provide SSI, hypoglycemia protocol and frequent Accu checks.      # Leukopenia. trend     -DVT prophylaxis :  heparin.   - Code Status : FULL      Lab Review:     Recent Labs     19  0519  1215   WBC 3.5* 4.3* 5.2   HGB 9.9* 10.1* 11.6*   HCT 32.1* 32.0* 36.7    368 459*     Recent Labs     19  0445 19  0519  1215    139 139   K 4.5 4.0 3.8    105 99*   CO2 29 27 31   * 29* 77   BUN 12 11 20*   CREA 1.24 1.00 1.83*   CA 8.1* 8.2* 10.4*   MG 2.0 2.2  --    PHOS 3.6 4.0  --    ALB  --  3.0* 4.0   TBILI  --  0.7 0.5   SGOT  --  22 26   ALT  --  18 24     Lab Results   Component Value Date/Time    Glucose (POC) 306 (H) 2019 07:58 AM    Glucose (POC) 265 (H) 2019 10:41 PM    Glucose (POC) 138 (H) 2019 04:19 PM    Glucose (POC) 309 (H) 2019 11:31 AM    Glucose (POC) 118 (H) 2019 08:16 AM    Glucose, POC 75 2019 12:25 PM     No results for input(s): PH, PCO2, PO2, HCO3, FIO2 in the last 72 hours. No results for input(s): INR in the last 72 hours.     No lab exists for component: INREXT    No results found for: SDES  Lab Results Component Value Date/Time    Culture result: NO GROWTH 2 DAYS 05/29/2019 12:25 PM    Culture result: (A) 05/29/2019 12:15 PM     ANAEROBIC BOTTLE STREPTOCOCCI, BETA HEMOLYTIC GROUP C           Subjective:     Chief Complaint:      No acute issue, foot same. ROS:  (bold if positive,otherwise negative)    Fever/chills ,  Dysuria   Cough , Sputum , SOB/ONEAL , Chest Pain     Diarrhea ,Nausea/Vomit , Abd Pain , Constipation     Tolerating Diet                Objective:     Vitals:  Last 24hrs VS reviewed since prior progress note. Most recent are:    Visit Vitals  /70 (BP 1 Location: Left arm, BP Patient Position: At rest)   Pulse 83   Temp 98.1 °F (36.7 °C)   Resp 18   Ht 5' 6\" (1.676 m)   Wt 68 kg (150 lb)   SpO2 96%   BMI 24.21 kg/m²     SpO2 Readings from Last 6 Encounters:   05/31/19 96%   11/15/18 98%   11/01/18 100%   10/18/18 100%   02/23/13 99%            Intake/Output Summary (Last 24 hours) at 5/31/2019 1011  Last data filed at 5/31/2019 4837  Gross per 24 hour   Intake 1475 ml   Output 2050 ml   Net -575 ml          Physical Exam:   Gen:  Appear stated age, Well-developed, well-nourished, in no acute distress  HEENT:  Head atraumatic, normocephalic , hearing intact to voice, moist mucous membranes. Neck:  Supple, no masses I appreciate, thyroid non-tender. Resp:  No accessory muscle use,Bilateral BS present, clear breath sounds without wheezes rales or rhonchi  Card:  No murmurs, normal S1, S2 without thrills, bruits or peripheral edema. Abd:  Soft, non-tender, non-distended, normoactive bowel sounds are present, no palpable organomegaly    Musc:  No cyanosis or clubbing. Skin: r foot ulcers x2  . RIGHT foot with necrotic tissue distal lateral 5th metatarsal head both plantar ulcer and lateral. skin turgor is good. Neuro: L UL weakness   Cranial nerves are grossly intact,  follows commands appropriately. alert.      Medications Reviewed: (see below)    Lab Data Reviewed: (see below)    ______________________________________________________________________    Medications:     Current Facility-Administered Medications   Medication Dose Route Frequency    vancomycin (VANCOCIN) 1,250 mg in 0.9% sodium chloride 250 mL IVPB  1,250 mg IntraVENous Q12H    VANCOMYCIN INFORMATION NOTE   Other ONCE    sodium chloride (NS) flush 5-10 mL  5-10 mL IntraVENous PRN    aspirin chewable tablet 81 mg  81 mg Oral DAILY    calcium-vitamin D 600 mg(1,500mg) -200 unit per tablet 1 Tab  1 Tab Oral DAILY    gabapentin (NEURONTIN) capsule 300 mg  300 mg Oral TID    simvastatin (ZOCOR) tablet 40 mg  40 mg Oral QHS    0.45% sodium chloride infusion  125 mL/hr IntraVENous CONTINUOUS    heparin (porcine) injection 5,000 Units  5,000 Units SubCUTAneous Q8H    insulin lispro (HUMALOG) injection   SubCUTAneous AC&HS    glucose chewable tablet 16 g  4 Tab Oral PRN    glucagon (GLUCAGEN) injection 1 mg  1 mg IntraMUSCular PRN    dextrose (D50) infusion 12.5-25 g  25-50 mL IntraVENous PRN    VANCOMYCIN INFORMATION NOTE   Other Rx Dosing/Monitoring    piperacillin-tazobactam (ZOSYN) 3.375 g in 0.9% sodium chloride (MBP/ADV) 100 mL \"Extended 4 Hours Infusion ####\"   3.375 g IntraVENous Q8H    insulin glargine (LANTUS) injection 10 Units  10 Units SubCUTAneous DAILY          Total time spent with patient: 35 minutes                  Care Plan discussed with: Patient, Care Manager, Nursing Staff and Consultant/Specialist    Discussed:  Care Plan    Prophylaxis:  Hep SQ    Disposition:  Home w/Family             Attending Physician: Tariq Mcgee MD

## 2019-05-31 NOTE — PROGRESS NOTES
1918: Assumed pt care. Received pt resting in bed, pt is alert and oriented x 4. Denies any pain at this time. No signs of distress. Call bell within reach. 2100: wound care done on pt's right foot, sprayed with dermal wound cleanser and scrubbed it, applied iodine soaked sterile gauze then wrapped in kerlix bandage, pt tolerated procedure well.    9-87-82    9073: Bedside and Verbal shift change report given to Isabel Lobato (oncoming nurse) by Sabrina Francois   (offgoing nurse). Report included the following information SBAR, Kardex, Intake/Output, MAR and Recent Results.

## 2019-05-31 NOTE — PROGRESS NOTES
1930 - [facility] Pt in bed, alert and oriented x 4. Not in any form of distress. Denies pain. Frequent use items and call bell within reach. Verbalized understanding to call for assistance. Bed locked in lowest position. Wound dressing changed as ordered. 0710 - Bedside and Verbal shift change report given to Pearl Fonseca RN (oncoming nurse) by Fabien Jj RN (offgoing nurse). Report included the following information SBAR, Kardex, Intake/Output, MAR and Recent Results.

## 2019-05-31 NOTE — PROGRESS NOTES
Bedside shift change report given to this RN (oncoming nurse) by Caprice Brito (offgoing nurse).  Report included the following information SBAR, Kardex and Cardiac Rhythm SR.

## 2019-05-31 NOTE — MANAGEMENT PLAN
Problem: Discharge Planning  Goal: *Discharge to safe environment  Outcome: Progressing Towards Goal    Plan: home    Care Management following and chart review. Possible surgery tomorrow?  Antibiotic length dependent on infection removal. Will continue to follow and assist with any planning needs      Prerna Carlisle RN BSN  Outcomes Manager  Pager # [phone]

## 2019-06-01 LAB
ANION GAP SERPL CALC-SCNC: 5 MMOL/L (ref 3–18)
BASOPHILS # BLD: 0 K/UL (ref 0–0.1)
BASOPHILS NFR BLD: 1 % (ref 0–2)
BUN SERPL-MCNC: 10 MG/DL (ref 7–18)
BUN/CREAT SERPL: 11 (ref 12–20)
CALCIUM SERPL-MCNC: 8.6 MG/DL (ref 8.5–10.1)
CHLORIDE SERPL-SCNC: 101 MMOL/L (ref 100–108)
CO2 SERPL-SCNC: 31 MMOL/L (ref 21–32)
CREAT SERPL-MCNC: 0.93 MG/DL (ref 0.6–1.3)
DIFFERENTIAL METHOD BLD: ABNORMAL
EOSINOPHIL # BLD: 0.1 K/UL (ref 0–0.4)
EOSINOPHIL NFR BLD: 3 % (ref 0–5)
ERYTHROCYTE [DISTWIDTH] IN BLOOD BY AUTOMATED COUNT: 12.7 % (ref 11.6–14.5)
GLUCOSE BLD STRIP.AUTO-MCNC: 182 MG/DL (ref 70–110)
GLUCOSE BLD STRIP.AUTO-MCNC: 257 MG/DL (ref 70–110)
GLUCOSE BLD STRIP.AUTO-MCNC: 292 MG/DL (ref 70–110)
GLUCOSE BLD STRIP.AUTO-MCNC: 357 MG/DL (ref 70–110)
GLUCOSE SERPL-MCNC: 224 MG/DL (ref 74–99)
HCT VFR BLD AUTO: 31.9 % (ref 36–48)
HGB BLD-MCNC: 10 G/DL (ref 13–16)
LYMPHOCYTES # BLD: 1.6 K/UL (ref 0.9–3.6)
LYMPHOCYTES NFR BLD: 48 % (ref 21–52)
MCH RBC QN AUTO: 29.2 PG (ref 24–34)
MCHC RBC AUTO-ENTMCNC: 31.3 G/DL (ref 31–37)
MCV RBC AUTO: 93.3 FL (ref 74–97)
MONOCYTES # BLD: 0.4 K/UL (ref 0.05–1.2)
MONOCYTES NFR BLD: 11 % (ref 3–10)
NEUTS SEG # BLD: 1.3 K/UL (ref 1.8–8)
NEUTS SEG NFR BLD: 37 % (ref 40–73)
PLATELET # BLD AUTO: 329 K/UL (ref 135–420)
PMV BLD AUTO: 10.1 FL (ref 9.2–11.8)
POTASSIUM SERPL-SCNC: 4.5 MMOL/L (ref 3.5–5.5)
RBC # BLD AUTO: 3.42 M/UL (ref 4.7–5.5)
SODIUM SERPL-SCNC: 137 MMOL/L (ref 136–145)
WBC # BLD AUTO: 3.4 K/UL (ref 4.6–13.2)

## 2019-06-01 PROCEDURE — 74011250637 HC RX REV CODE- 250/637: Performed by: INTERNAL MEDICINE

## 2019-06-01 PROCEDURE — 36415 COLL VENOUS BLD VENIPUNCTURE: CPT

## 2019-06-01 PROCEDURE — 74011636637 HC RX REV CODE- 636/637: Performed by: INTERNAL MEDICINE

## 2019-06-01 PROCEDURE — 65660000000 HC RM CCU STEPDOWN

## 2019-06-01 PROCEDURE — 82962 GLUCOSE BLOOD TEST: CPT

## 2019-06-01 PROCEDURE — 74011000258 HC RX REV CODE- 258: Performed by: INTERNAL MEDICINE

## 2019-06-01 PROCEDURE — 74011250636 HC RX REV CODE- 250/636: Performed by: INTERNAL MEDICINE

## 2019-06-01 PROCEDURE — 80048 BASIC METABOLIC PNL TOTAL CA: CPT

## 2019-06-01 PROCEDURE — 85025 COMPLETE CBC W/AUTO DIFF WBC: CPT

## 2019-06-01 RX ADMIN — GABAPENTIN 300 MG: 100 CAPSULE ORAL at 08:32

## 2019-06-01 RX ADMIN — PIPERACILLIN SODIUM,TAZOBACTAM SODIUM 3.38 G: 3; .375 INJECTION, POWDER, FOR SOLUTION INTRAVENOUS at 21:49

## 2019-06-01 RX ADMIN — SODIUM CHLORIDE 1250 MG: 900 INJECTION, SOLUTION INTRAVENOUS at 13:58

## 2019-06-01 RX ADMIN — INSULIN LISPRO 9 UNITS: 100 INJECTION, SOLUTION INTRAVENOUS; SUBCUTANEOUS at 08:30

## 2019-06-01 RX ADMIN — INSULIN LISPRO 3 UNITS: 100 INJECTION, SOLUTION INTRAVENOUS; SUBCUTANEOUS at 16:59

## 2019-06-01 RX ADMIN — GABAPENTIN 300 MG: 100 CAPSULE ORAL at 22:02

## 2019-06-01 RX ADMIN — HEPARIN SODIUM 5000 UNITS: 5000 INJECTION INTRAVENOUS; SUBCUTANEOUS at 13:58

## 2019-06-01 RX ADMIN — HEPARIN SODIUM 5000 UNITS: 5000 INJECTION INTRAVENOUS; SUBCUTANEOUS at 21:48

## 2019-06-01 RX ADMIN — INSULIN GLARGINE 6 UNITS: 100 INJECTION, SOLUTION SUBCUTANEOUS at 21:48

## 2019-06-01 RX ADMIN — Medication 1 TABLET: at 08:32

## 2019-06-01 RX ADMIN — SODIUM CHLORIDE 1250 MG: 900 INJECTION, SOLUTION INTRAVENOUS at 02:04

## 2019-06-01 RX ADMIN — PIPERACILLIN SODIUM,TAZOBACTAM SODIUM 3.38 G: 3; .375 INJECTION, POWDER, FOR SOLUTION INTRAVENOUS at 12:23

## 2019-06-01 RX ADMIN — GABAPENTIN 300 MG: 100 CAPSULE ORAL at 17:00

## 2019-06-01 RX ADMIN — ASPIRIN 81 MG 81 MG: 81 TABLET ORAL at 08:32

## 2019-06-01 RX ADMIN — SIMVASTATIN 40 MG: 40 TABLET, FILM COATED ORAL at 22:02

## 2019-06-01 RX ADMIN — INSULIN LISPRO 9 UNITS: 100 INJECTION, SOLUTION INTRAVENOUS; SUBCUTANEOUS at 11:40

## 2019-06-01 RX ADMIN — PIPERACILLIN SODIUM,TAZOBACTAM SODIUM 3.38 G: 3; .375 INJECTION, POWDER, FOR SOLUTION INTRAVENOUS at 04:52

## 2019-06-01 RX ADMIN — INSULIN LISPRO 15 UNITS: 100 INJECTION, SOLUTION INTRAVENOUS; SUBCUTANEOUS at 22:02

## 2019-06-01 RX ADMIN — INSULIN GLARGINE 6 UNITS: 100 INJECTION, SOLUTION SUBCUTANEOUS at 08:32

## 2019-06-01 NOTE — PROGRESS NOTES
Internal Medicine Progress Note        NAME: Desiree Cm   :  1975  MRM:  [id number]    Date/Time: 2019        ASSESSMENT/PLAN:      #  Sepsis ([facility] [address] (2019) , likely from foot infection. Iv vanco and zosyn. Chemistry not available yet. Blood and ulcer CS. ID consulted. Tele bed. Maintain vital signs. IVF. Lactic acid. Presented with hypotension and tachycardia  - blood C/S , one bottle positive for GPC, anaerobic positive for  BHS      # Dehydration. IVF. Strict I/O      #   Right foot ulcer, with necrosis of muscle ([facility] [address] (10/18/2018). Podiatrist consulted. Wound care     #   Osteomyelitis of right foot ([facility] [address] (2019). MRI positive for  OM. Possible the source of the infection. ID and podiatry consulted. As above. Surgery anticipated       #   Left arm weakness (2019) from previous injury in his neck. Stable.      # DM .  Provide SSI, hypoglycemia protocol and frequent Accu checks.      # Leukopenia. trend     -DVT prophylaxis :  heparin.   - Code Status : FULL      Lab Review:     Recent Labs     19  0420 19  0445 19  0530   WBC 3.4* 3.5* 4.3*   HGB 10.0* 9.9* 10.1*   HCT 31.9* 32.1* 32.0*    346 368     Recent Labs     19  0420 19  0445 19  0530 19  1215    137 139 139   K 4.5 4.5 4.0 3.8    101 105 99*   CO2 31 29 27 31   * 257* 29* 77   BUN 10 12 11 20*   CREA 0.93 1.24 1.00 1.83*   CA 8.6 8.1* 8.2* 10.4*   MG  --  2.0 2.2  --    PHOS  --  3.6 4.0  --    ALB  --   --  3.0* 4.0   TBILI  --   --  0.7 0.5   SGOT  --   --  22 26   ALT  --   --  18 24     Lab Results   Component Value Date/Time    Glucose (POC) 292 (H) 2019 07:56 AM    Glucose (POC) 154 (H) 2019 09:39 PM    Glucose (POC) 146 (H) 2019 04:31 PM    Glucose (POC) 253 (H) 2019 11:55 AM    Glucose (POC) 306 (H) 2019 07:58 AM    Glucose, POC 75 2019 12:25 PM     No results for input(s): PH, PCO2, PO2, HCO3, FIO2 in the last 72 hours. No results for input(s): INR in the last 72 hours. No lab exists for component: INREXT, INREXT    No results found for: SDES  Lab Results   Component Value Date/Time    Culture result: NO GROWTH 3 DAYS 05/29/2019 12:25 PM    Culture result: (A) 05/29/2019 12:15 PM     ANAEROBIC BOTTLE STREPTOCOCCI, BETA HEMOLYTIC GROUP C           Subjective:     Chief Complaint:      No acute issue, foot same. ROS:  (bold if positive,otherwise negative)    Fever/chills ,  Dysuria   Cough , Sputum , SOB/ONEAL , Chest Pain     Diarrhea ,Nausea/Vomit , Abd Pain , Constipation     Tolerating Diet                Objective:     Vitals:  Last 24hrs VS reviewed since prior progress note. Most recent are:    Visit Vitals  /75   Pulse 70   Temp 97.9 °F (36.6 °C)   Resp 18   Ht 5' 6\" (1.676 m)   Wt 68 kg (150 lb)   SpO2 98%   BMI 24.21 kg/m²     SpO2 Readings from Last 6 Encounters:   06/01/19 98%   11/15/18 98%   11/01/18 100%   10/18/18 100%   02/23/13 99%            Intake/Output Summary (Last 24 hours) at 6/1/2019 0945  Last data filed at 6/1/2019 0459  Gross per 24 hour   Intake    Output 1125 ml   Net -1125 ml          Physical Exam:   Gen:  Appear stated age, Well-developed, well-nourished, in no acute distress  HEENT:  Head atraumatic, normocephalic , hearing intact to voice, moist mucous membranes. Neck:  Supple, no masses I appreciate, thyroid non-tender. Resp:  No accessory muscle use,Bilateral BS present, clear breath sounds without wheezes rales or rhonchi  Card:  No murmurs, normal S1, S2 without thrills, bruits or peripheral edema. Abd:  Soft, non-tender, non-distended, normoactive bowel sounds are present, no palpable organomegaly    Musc:  No cyanosis or clubbing. Skin: r foot ulcers x2  . RIGHT foot with necrotic tissue distal lateral 5th metatarsal head both plantar ulcer and lateral. skin turgor is good.   Neuro: L UL weakness   Cranial nerves are grossly intact,  follows commands appropriately. alert.      Medications Reviewed: (see below)    Lab Data Reviewed: (see below)    ______________________________________________________________________    Medications:     Current Facility-Administered Medications   Medication Dose Route Frequency    [START ON 6/2/2019] VANCOMYCIN INFORMATION NOTE   Other ONCE    insulin glargine (LANTUS) injection 6 Units  6 Units SubCUTAneous BID    dextrose 10 % infusion 125-250 mL  125-250 mL IntraVENous PRN    vancomycin (VANCOCIN) 1,250 mg in 0.9% sodium chloride 250 mL IVPB  1,250 mg IntraVENous Q12H    sodium chloride (NS) flush 5-10 mL  5-10 mL IntraVENous PRN    aspirin chewable tablet 81 mg  81 mg Oral DAILY    calcium-vitamin D 600 mg(1,500mg) -200 unit per tablet 1 Tab  1 Tab Oral DAILY    gabapentin (NEURONTIN) capsule 300 mg  300 mg Oral TID    simvastatin (ZOCOR) tablet 40 mg  40 mg Oral QHS    0.45% sodium chloride infusion  75 mL/hr IntraVENous CONTINUOUS    heparin (porcine) injection 5,000 Units  5,000 Units SubCUTAneous Q8H    insulin lispro (HUMALOG) injection   SubCUTAneous AC&HS    glucose chewable tablet 16 g  4 Tab Oral PRN    glucagon (GLUCAGEN) injection 1 mg  1 mg IntraMUSCular PRN    VANCOMYCIN INFORMATION NOTE   Other Rx Dosing/Monitoring    piperacillin-tazobactam (ZOSYN) 3.375 g in 0.9% sodium chloride (MBP/ADV) 100 mL \"Extended 4 Hours Infusion ####\"   3.375 g IntraVENous Q8H          Total time spent with patient: 35 minutes                  Care Plan discussed with: Patient, Care Manager, Nursing Staff and Consultant/Specialist    Discussed:  Care Plan    Prophylaxis:  Hep SQ    Disposition:  Home w/Family             Attending Physician: Patricia Loredo MD

## 2019-06-01 NOTE — PROGRESS NOTES
Bedside shift change report given to this RN (oncoming nurse) by Octavio Oliver RN (offgoing nurse).  Report included the following information SBAR, Kardex and Cardiac Rhythm SR.

## 2019-06-01 NOTE — PROGRESS NOTES
Podiatry Surgery Progress Note    Patient: Briana Gustafson MRN: [id number] LOS: 3     YOB: 1975  Age: 39 y.o. Sex: male        Admit Date: 5/29/2019    POD * No surgery found *  Procedure:   * No surgery found *        Subjective:     Mr Louisa Barber U 30 y. o. male with DM, HTN.  Who seen this morning at bedside stating over all feeling better. He stated that had MRI done yesterday late in the afternoon. He was admitted for further work up for right foot NON healing ulcers lateral and sub 5th met head despite prior treatment.      Review of Systems:    Constitutional: Negative for chills and fever. He denies chronic fatigue, weight loss,depression, nervousness, panic attacks. HEENT: Negative for congestion, rhinorrhea and sore throat.  He denies ringing in ears,  dizzy spells, glaucoma, sinus trouble,  Respiratory: Denies for cough and shortness of breath, hemoptysis    Cardiovascular: Denies chest pain, irregular heart beat.   Gastrointestinal: Negative for abdominal pain, blood in stool, constipation, diarrhea, nausea and vomiting. Genitourinary: Negative for dysuria, frequency and hematuria. Musculoskeletal: Negative for back pain and myalgias. Skin: Positive for RIGHT foot with necrotic tissue distal lateral 5th metatarsal head both plantar ulcer and lateral  Negative for rash. Neurological: Negative for dizziness and headaches        Objective:     Vitals  Patient Vitals for the past 8 hrs:   BP Temp Pulse Resp SpO2   06/01/19 0824 115/75 97.9 °F (36.6 °C) 70 18 98 %   06/01/19 0800     98 %   06/01/19 0458 114/78 98 °F (36.7 °C) 71 18 97 %       I/O Current Shift  No intake/output data recorded.     I/O Last Three Shifts  05/30 1901 - 06/01 0700  In: 1475 [I.V.:1475]  Out: 2325 [Urine:2325]      Data Review  Recent Results (from the past 24 hour(s))   GLUCOSE, POC    Collection Time: 05/31/19 11:55 AM   Result Value Ref Range    Glucose (POC) 253 (H) 70 - 110 mg/dL   VANCOMYCIN TROUGH    Collection Time: 05/31/19 12:28 PM   Result Value Ref Range    Vancomycin,trough 19.7 10.0 - 20.0 ug/mL   GLUCOSE, POC    Collection Time: 05/31/19  4:31 PM   Result Value Ref Range    Glucose (POC) 146 (H) 70 - 110 mg/dL   GLUCOSE, POC    Collection Time: 05/31/19  9:39 PM   Result Value Ref Range    Glucose (POC) 154 (H) 70 - 919 mg/dL   METABOLIC PANEL, BASIC    Collection Time: 06/01/19  4:20 AM   Result Value Ref Range    Sodium 137 136 - 145 mmol/L    Potassium 4.5 3.5 - 5.5 mmol/L    Chloride 101 100 - 108 mmol/L    CO2 31 21 - 32 mmol/L    Anion gap 5 3.0 - 18 mmol/L    Glucose 224 (H) 74 - 99 mg/dL    BUN 10 7.0 - 18 MG/DL    Creatinine 0.93 0.6 - 1.3 MG/DL    BUN/Creatinine ratio 11 (L) 12 - 20      GFR est AA >60 >60 ml/min/1.73m2    GFR est non-AA >60 >60 ml/min/1.73m2    Calcium 8.6 8.5 - 10.1 MG/DL   CBC WITH AUTOMATED DIFF    Collection Time: 06/01/19  4:20 AM   Result Value Ref Range    WBC 3.4 (L) 4.6 - 13.2 K/uL    RBC 3.42 (L) 4.70 - 5.50 M/uL    HGB 10.0 (L) 13.0 - 16.0 g/dL    HCT 31.9 (L) 36.0 - 48.0 %    MCV 93.3 74.0 - 97.0 FL    MCH 29.2 24.0 - 34.0 PG    MCHC 31.3 31.0 - 37.0 g/dL    RDW 12.7 11.6 - 14.5 %    PLATELET 028 927 - 757 K/uL    MPV 10.1 9.2 - 11.8 FL    NEUTROPHILS 37 (L) 40 - 73 %    LYMPHOCYTES 48 21 - 52 %    MONOCYTES 11 (H) 3 - 10 %    EOSINOPHILS 3 0 - 5 %    BASOPHILS 1 0 - 2 %    ABS. NEUTROPHILS 1.3 (L) 1.8 - 8.0 K/UL    ABS. LYMPHOCYTES 1.6 0.9 - 3.6 K/UL    ABS. MONOCYTES 0.4 0.05 - 1.2 K/UL    ABS. EOSINOPHILS 0.1 0.0 - 0.4 K/UL    ABS. BASOPHILS 0.0 0.0 - 0.1 K/UL    DF AUTOMATED     GLUCOSE, POC    Collection Time: 06/01/19  7:56 AM   Result Value Ref Range    Glucose (POC) 292 (H) 70 - 110 mg/dL       Physical Exam:    Michell Singleton  is a 39 y.o. male who is pleasant, alert and oriented x3, in no apparent distress, and looks their given age. Patient is well-developed and nourished, with good attention to hygiene and body habitus.  Mood and affect normal, appropriate to situation. Lower Extremity Exam: .   Left: 5th metatarsal  with no odor present. There are two ulcers lateral and sub 5th met heads.  There is some  diffuse erythema and mild edema.  NO active weeping / drainage.     VASCULAR EXAM:   Pedal pulses: intact 2/4 D/P and P/T.  Skin temperature is warm to warm right and left foot. Digital capillary fill time is 3sec right and left foot.      Neurological Exam:   Light touch protective sensation is absent to both feet. There is noted Loss of protective sensation. There are no Tinel's or 's signs present to the nerves crossing the ankle joint.     MUSCULOSKELETAL EXAM:.   Muscle tone is normal for age and situation. Muscle strength of the flexor and extensor group inversion and eversion Bilateral 5/5.      MYCOTIC NAIL:.   Dystrophic nail 1,2,3,4,5 bilateral. Elongated thickened nails 1,2,3,4,5 bilateral Hypertrophic nails 1,2,3,4,5     DERMATOLOGICAL EXAM:.   Skin is of abnormal texture and turgor with some atrophic skin changes noting decreased hair growth, nail changes (thickening), pigmentary changes, skin texture (thin,shiney), skin color (rubor, red) . R/L Bilateral. There is diffuse xerosis.  There is noted subungual debris     RADIOGRAPHIC FINDINGS:.  (see report for details)  lytic changes of the fifth distal metatarsal compatible with  osteomyelitis        Wound Presentation:  Wound Foot Right;Plantar;Medial (Active)   Dressing Status  Clean, dry, and intact 5/31/2019  7:30 PM   Number of days: 226       Wound Foot Right;Plantar;Lateral (Active)   Dressing Status  Clean, dry, and intact 5/31/2019  7:30 PM   Number of days: 226          Assessment:     Principal Problem:    Sepsis ([address] (5/29/2019)    Active Problems:    Right foot ulcer, with necrosis of muscle ([address] (10/18/2018)      Osteomyelitis of right foot ([address] (5/29/2019)      Left arm weakness (5/29/2019)      NON healing ulcer right foot     Plan/Recommendations/Medical Decision Making:     Discussed with the patient all the above findings. Again discussed the surgical treatment options pending MRI findings. All questions were answered and he indicated that he understood.      Continue  LWC and IVabx.      MRI done report pending will set up surgery once get information from the MRI.        Dr Herrera [facility]  C) [phone]  6/1/2019  10:11 AM

## 2019-06-02 ENCOUNTER — ANESTHESIA (OUTPATIENT)
Dept: SURGERY | Age: 44
DRG: 854 | End: 2019-06-02
Payer: MEDICARE

## 2019-06-02 ENCOUNTER — ANESTHESIA EVENT (OUTPATIENT)
Dept: SURGERY | Age: 44
DRG: 854 | End: 2019-06-02
Payer: MEDICARE

## 2019-06-02 LAB
ANION GAP SERPL CALC-SCNC: 7 MMOL/L (ref 3–18)
BASOPHILS # BLD: 0 K/UL (ref 0–0.1)
BASOPHILS NFR BLD: 1 % (ref 0–2)
BUN SERPL-MCNC: 17 MG/DL (ref 7–18)
BUN/CREAT SERPL: 18 (ref 12–20)
CALCIUM SERPL-MCNC: 8.8 MG/DL (ref 8.5–10.1)
CHLORIDE SERPL-SCNC: 100 MMOL/L (ref 100–108)
CO2 SERPL-SCNC: 30 MMOL/L (ref 21–32)
CREAT SERPL-MCNC: 0.95 MG/DL (ref 0.6–1.3)
DATE LAST DOSE: NORMAL
DIFFERENTIAL METHOD BLD: ABNORMAL
EOSINOPHIL # BLD: 0.1 K/UL (ref 0–0.4)
EOSINOPHIL NFR BLD: 3 % (ref 0–5)
ERYTHROCYTE [DISTWIDTH] IN BLOOD BY AUTOMATED COUNT: 12.7 % (ref 11.6–14.5)
GLUCOSE BLD STRIP.AUTO-MCNC: 134 MG/DL (ref 70–110)
GLUCOSE BLD STRIP.AUTO-MCNC: 165 MG/DL (ref 70–110)
GLUCOSE BLD STRIP.AUTO-MCNC: 180 MG/DL (ref 70–110)
GLUCOSE BLD STRIP.AUTO-MCNC: 226 MG/DL (ref 70–110)
GLUCOSE BLD STRIP.AUTO-MCNC: 300 MG/DL (ref 70–110)
GLUCOSE SERPL-MCNC: 188 MG/DL (ref 74–99)
HCT VFR BLD AUTO: 32.6 % (ref 36–48)
HGB BLD-MCNC: 10 G/DL (ref 13–16)
LYMPHOCYTES # BLD: 1.5 K/UL (ref 0.9–3.6)
LYMPHOCYTES NFR BLD: 42 % (ref 21–52)
MCH RBC QN AUTO: 28.7 PG (ref 24–34)
MCHC RBC AUTO-ENTMCNC: 30.7 G/DL (ref 31–37)
MCV RBC AUTO: 93.7 FL (ref 74–97)
MONOCYTES # BLD: 0.3 K/UL (ref 0.05–1.2)
MONOCYTES NFR BLD: 9 % (ref 3–10)
NEUTS SEG # BLD: 1.6 K/UL (ref 1.8–8)
NEUTS SEG NFR BLD: 45 % (ref 40–73)
PLATELET # BLD AUTO: 320 K/UL (ref 135–420)
PMV BLD AUTO: 10.2 FL (ref 9.2–11.8)
POTASSIUM SERPL-SCNC: 4.2 MMOL/L (ref 3.5–5.5)
RBC # BLD AUTO: 3.48 M/UL (ref 4.7–5.5)
REPORTED DOSE,DOSE: NORMAL UNITS
REPORTED DOSE/TIME,TMG: NORMAL
SODIUM SERPL-SCNC: 137 MMOL/L (ref 136–145)
VANCOMYCIN TROUGH SERPL-MCNC: 17 UG/ML (ref 10–20)
WBC # BLD AUTO: 3.6 K/UL (ref 4.6–13.2)

## 2019-06-02 PROCEDURE — 87075 CULTR BACTERIA EXCEPT BLOOD: CPT

## 2019-06-02 PROCEDURE — 76210000026 HC REC RM PH II 1 TO 1.5 HR: Performed by: PODIATRIST

## 2019-06-02 PROCEDURE — 87070 CULTURE OTHR SPECIMN AEROBIC: CPT

## 2019-06-02 PROCEDURE — 77030020753 HC CUF TRNQT 1BLA STRY -B: Performed by: PODIATRIST

## 2019-06-02 PROCEDURE — 80048 BASIC METABOLIC PNL TOTAL CA: CPT

## 2019-06-02 PROCEDURE — 74011250637 HC RX REV CODE- 250/637: Performed by: PODIATRIST

## 2019-06-02 PROCEDURE — 0CBM0ZZ EXCISION OF PHARYNX, OPEN APPROACH: ICD-10-PCS | Performed by: PODIATRIST

## 2019-06-02 PROCEDURE — 76060000033 HC ANESTHESIA 1 TO 1.5 HR: Performed by: PODIATRIST

## 2019-06-02 PROCEDURE — 74011636637 HC RX REV CODE- 636/637: Performed by: INTERNAL MEDICINE

## 2019-06-02 PROCEDURE — 74011250636 HC RX REV CODE- 250/636: Performed by: PODIATRIST

## 2019-06-02 PROCEDURE — 88307 TISSUE EXAM BY PATHOLOGIST: CPT

## 2019-06-02 PROCEDURE — 77030003009 HC SUT SURG STL J&J -A: Performed by: PODIATRIST

## 2019-06-02 PROCEDURE — 80202 ASSAY OF VANCOMYCIN: CPT

## 2019-06-02 PROCEDURE — 0QBN0ZZ EXCISION OF RIGHT METATARSAL, OPEN APPROACH: ICD-10-PCS | Performed by: PODIATRIST

## 2019-06-02 PROCEDURE — 88311 DECALCIFY TISSUE: CPT

## 2019-06-02 PROCEDURE — 65660000000 HC RM CCU STEPDOWN

## 2019-06-02 PROCEDURE — 74011250636 HC RX REV CODE- 250/636

## 2019-06-02 PROCEDURE — 74011000250 HC RX REV CODE- 250: Performed by: PODIATRIST

## 2019-06-02 PROCEDURE — 88305 TISSUE EXAM BY PATHOLOGIST: CPT

## 2019-06-02 PROCEDURE — 74011250636 HC RX REV CODE- 250/636: Performed by: INTERNAL MEDICINE

## 2019-06-02 PROCEDURE — 0HXMXZZ TRANSFER RIGHT FOOT SKIN, EXTERNAL APPROACH: ICD-10-PCS | Performed by: PODIATRIST

## 2019-06-02 PROCEDURE — 77030006773 HC BLD SAW OSC BRSM -A: Performed by: PODIATRIST

## 2019-06-02 PROCEDURE — 74011000258 HC RX REV CODE- 258: Performed by: INTERNAL MEDICINE

## 2019-06-02 PROCEDURE — 77030002912 HC SUT ETHBND J&J -A: Performed by: PODIATRIST

## 2019-06-02 PROCEDURE — 36415 COLL VENOUS BLD VENIPUNCTURE: CPT

## 2019-06-02 PROCEDURE — 85025 COMPLETE CBC W/AUTO DIFF WBC: CPT

## 2019-06-02 PROCEDURE — 77030013708 HC HNDPC SUC IRR PULS STRY –B: Performed by: PODIATRIST

## 2019-06-02 PROCEDURE — 77030002916 HC SUT ETHLN J&J -A: Performed by: PODIATRIST

## 2019-06-02 PROCEDURE — 74011250636 HC RX REV CODE- 250/636: Performed by: NURSE ANESTHETIST, CERTIFIED REGISTERED

## 2019-06-02 PROCEDURE — 82962 GLUCOSE BLOOD TEST: CPT

## 2019-06-02 PROCEDURE — 77030027714 HC DRN WND KT TLS STRY -B: Performed by: PODIATRIST

## 2019-06-02 PROCEDURE — 76010000149 HC OR TIME 1 TO 1.5 HR: Performed by: PODIATRIST

## 2019-06-02 PROCEDURE — 77030018836 HC SOL IRR NACL ICUM -A: Performed by: PODIATRIST

## 2019-06-02 PROCEDURE — 74011000258 HC RX REV CODE- 258: Performed by: PODIATRIST

## 2019-06-02 PROCEDURE — 74011636637 HC RX REV CODE- 636/637: Performed by: PODIATRIST

## 2019-06-02 RX ORDER — HYDROMORPHONE HYDROCHLORIDE 2 MG/ML
0.5 INJECTION, SOLUTION INTRAMUSCULAR; INTRAVENOUS; SUBCUTANEOUS
Status: DISCONTINUED | OUTPATIENT
Start: 2019-06-02 | End: 2019-06-02 | Stop reason: HOSPADM

## 2019-06-02 RX ORDER — FENTANYL CITRATE 50 UG/ML
50 INJECTION, SOLUTION INTRAMUSCULAR; INTRAVENOUS AS NEEDED
Status: DISCONTINUED | OUTPATIENT
Start: 2019-06-02 | End: 2019-06-02 | Stop reason: HOSPADM

## 2019-06-02 RX ORDER — ACETAMINOPHEN 325 MG/1
650 TABLET ORAL
Status: DISCONTINUED | OUTPATIENT
Start: 2019-06-02 | End: 2019-06-03

## 2019-06-02 RX ORDER — MIDAZOLAM HYDROCHLORIDE 1 MG/ML
INJECTION, SOLUTION INTRAMUSCULAR; INTRAVENOUS AS NEEDED
Status: DISCONTINUED | OUTPATIENT
Start: 2019-06-02 | End: 2019-06-02 | Stop reason: HOSPADM

## 2019-06-02 RX ORDER — OXYCODONE AND ACETAMINOPHEN 5; 325 MG/1; MG/1
1 TABLET ORAL ONCE
Status: DISCONTINUED | OUTPATIENT
Start: 2019-06-02 | End: 2019-06-02 | Stop reason: HOSPADM

## 2019-06-02 RX ORDER — LIDOCAINE HYDROCHLORIDE 10 MG/ML
INJECTION INFILTRATION; PERINEURAL AS NEEDED
Status: DISCONTINUED | OUTPATIENT
Start: 2019-06-02 | End: 2019-06-02 | Stop reason: HOSPADM

## 2019-06-02 RX ORDER — BUPIVACAINE HYDROCHLORIDE 5 MG/ML
INJECTION, SOLUTION EPIDURAL; INTRACAUDAL AS NEEDED
Status: DISCONTINUED | OUTPATIENT
Start: 2019-06-02 | End: 2019-06-02 | Stop reason: HOSPADM

## 2019-06-02 RX ORDER — ONDANSETRON 2 MG/ML
4 INJECTION INTRAMUSCULAR; INTRAVENOUS ONCE
Status: DISCONTINUED | OUTPATIENT
Start: 2019-06-02 | End: 2019-06-02 | Stop reason: HOSPADM

## 2019-06-02 RX ORDER — PROPOFOL 10 MG/ML
INJECTION, EMULSION INTRAVENOUS AS NEEDED
Status: DISCONTINUED | OUTPATIENT
Start: 2019-06-02 | End: 2019-06-02 | Stop reason: HOSPADM

## 2019-06-02 RX ORDER — MAGNESIUM SULFATE 100 %
4 CRYSTALS MISCELLANEOUS AS NEEDED
Status: DISCONTINUED | OUTPATIENT
Start: 2019-06-02 | End: 2019-06-02 | Stop reason: HOSPADM

## 2019-06-02 RX ORDER — DEXTROSE MONOHYDRATE 100 MG/ML
125-250 INJECTION, SOLUTION INTRAVENOUS AS NEEDED
Status: DISCONTINUED | OUTPATIENT
Start: 2019-06-02 | End: 2019-06-02 | Stop reason: HOSPADM

## 2019-06-02 RX ORDER — INSULIN LISPRO 100 [IU]/ML
INJECTION, SOLUTION INTRAVENOUS; SUBCUTANEOUS ONCE
Status: DISCONTINUED | OUTPATIENT
Start: 2019-06-02 | End: 2019-06-02 | Stop reason: HOSPADM

## 2019-06-02 RX ORDER — SODIUM CHLORIDE 0.9 % (FLUSH) 0.9 %
5-40 SYRINGE (ML) INJECTION AS NEEDED
Status: DISCONTINUED | OUTPATIENT
Start: 2019-06-02 | End: 2019-06-02 | Stop reason: HOSPADM

## 2019-06-02 RX ORDER — PROPOFOL 10 MG/ML
INJECTION, EMULSION INTRAVENOUS
Status: DISCONTINUED | OUTPATIENT
Start: 2019-06-02 | End: 2019-06-02 | Stop reason: HOSPADM

## 2019-06-02 RX ORDER — SODIUM CHLORIDE, SODIUM LACTATE, POTASSIUM CHLORIDE, CALCIUM CHLORIDE 600; 310; 30; 20 MG/100ML; MG/100ML; MG/100ML; MG/100ML
INJECTION, SOLUTION INTRAVENOUS
Status: DISCONTINUED | OUTPATIENT
Start: 2019-06-02 | End: 2019-06-02 | Stop reason: HOSPADM

## 2019-06-02 RX ORDER — FENTANYL CITRATE 50 UG/ML
INJECTION, SOLUTION INTRAMUSCULAR; INTRAVENOUS AS NEEDED
Status: DISCONTINUED | OUTPATIENT
Start: 2019-06-02 | End: 2019-06-02 | Stop reason: HOSPADM

## 2019-06-02 RX ORDER — LIDOCAINE HYDROCHLORIDE 20 MG/ML
INJECTION, SOLUTION EPIDURAL; INFILTRATION; INTRACAUDAL; PERINEURAL AS NEEDED
Status: DISCONTINUED | OUTPATIENT
Start: 2019-06-02 | End: 2019-06-02 | Stop reason: HOSPADM

## 2019-06-02 RX ORDER — SODIUM CHLORIDE, SODIUM LACTATE, POTASSIUM CHLORIDE, CALCIUM CHLORIDE 600; 310; 30; 20 MG/100ML; MG/100ML; MG/100ML; MG/100ML
125 INJECTION, SOLUTION INTRAVENOUS CONTINUOUS
Status: DISCONTINUED | OUTPATIENT
Start: 2019-06-02 | End: 2019-06-02 | Stop reason: HOSPADM

## 2019-06-02 RX ORDER — NALOXONE HYDROCHLORIDE 0.4 MG/ML
0.04 INJECTION, SOLUTION INTRAMUSCULAR; INTRAVENOUS; SUBCUTANEOUS AS NEEDED
Status: DISCONTINUED | OUTPATIENT
Start: 2019-06-02 | End: 2019-06-02 | Stop reason: HOSPADM

## 2019-06-02 RX ORDER — SODIUM CHLORIDE 0.9 % (FLUSH) 0.9 %
5-40 SYRINGE (ML) INJECTION EVERY 8 HOURS
Status: DISCONTINUED | OUTPATIENT
Start: 2019-06-02 | End: 2019-06-02 | Stop reason: HOSPADM

## 2019-06-02 RX ORDER — LANOLIN ALCOHOL/MO/W.PET/CERES
3 CREAM (GRAM) TOPICAL
Status: DISCONTINUED | OUTPATIENT
Start: 2019-06-02 | End: 2019-06-06 | Stop reason: HOSPADM

## 2019-06-02 RX ADMIN — SODIUM CHLORIDE, SODIUM LACTATE, POTASSIUM CHLORIDE, CALCIUM CHLORIDE: 600; 310; 30; 20 INJECTION, SOLUTION INTRAVENOUS at 10:27

## 2019-06-02 RX ADMIN — PIPERACILLIN SODIUM,TAZOBACTAM SODIUM 3.38 G: 3; .375 INJECTION, POWDER, FOR SOLUTION INTRAVENOUS at 22:28

## 2019-06-02 RX ADMIN — FENTANYL CITRATE 50 MCG: 50 INJECTION, SOLUTION INTRAMUSCULAR; INTRAVENOUS at 10:25

## 2019-06-02 RX ADMIN — SODIUM CHLORIDE 75 ML/HR: 450 INJECTION, SOLUTION INTRAVENOUS at 22:30

## 2019-06-02 RX ADMIN — SIMVASTATIN 40 MG: 40 TABLET, FILM COATED ORAL at 22:24

## 2019-06-02 RX ADMIN — INSULIN GLARGINE 6 UNITS: 100 INJECTION, SOLUTION SUBCUTANEOUS at 07:58

## 2019-06-02 RX ADMIN — INSULIN LISPRO 6 UNITS: 100 INJECTION, SOLUTION INTRAVENOUS; SUBCUTANEOUS at 17:00

## 2019-06-02 RX ADMIN — GABAPENTIN 300 MG: 100 CAPSULE ORAL at 17:01

## 2019-06-02 RX ADMIN — PROPOFOL 50 MCG/KG/MIN: 10 INJECTION, EMULSION INTRAVENOUS at 10:27

## 2019-06-02 RX ADMIN — INSULIN GLARGINE 6 UNITS: 100 INJECTION, SOLUTION SUBCUTANEOUS at 22:30

## 2019-06-02 RX ADMIN — PROPOFOL 40 MG: 10 INJECTION, EMULSION INTRAVENOUS at 10:27

## 2019-06-02 RX ADMIN — HEPARIN SODIUM 5000 UNITS: 5000 INJECTION INTRAVENOUS; SUBCUTANEOUS at 22:29

## 2019-06-02 RX ADMIN — LIDOCAINE HYDROCHLORIDE 20 MG: 20 INJECTION, SOLUTION EPIDURAL; INFILTRATION; INTRACAUDAL; PERINEURAL at 10:27

## 2019-06-02 RX ADMIN — PIPERACILLIN SODIUM,TAZOBACTAM SODIUM 3.38 G: 3; .375 INJECTION, POWDER, FOR SOLUTION INTRAVENOUS at 15:06

## 2019-06-02 RX ADMIN — SODIUM CHLORIDE, SODIUM LACTATE, POTASSIUM CHLORIDE, AND CALCIUM CHLORIDE 125 ML/HR: 600; 310; 30; 20 INJECTION, SOLUTION INTRAVENOUS at 11:35

## 2019-06-02 RX ADMIN — SODIUM CHLORIDE 1250 MG: 900 INJECTION, SOLUTION INTRAVENOUS at 15:06

## 2019-06-02 RX ADMIN — SODIUM CHLORIDE 1250 MG: 900 INJECTION, SOLUTION INTRAVENOUS at 01:54

## 2019-06-02 RX ADMIN — SODIUM CHLORIDE 75 ML/HR: 450 INJECTION, SOLUTION INTRAVENOUS at 01:53

## 2019-06-02 RX ADMIN — PIPERACILLIN SODIUM,TAZOBACTAM SODIUM 3.38 G: 3; .375 INJECTION, POWDER, FOR SOLUTION INTRAVENOUS at 04:52

## 2019-06-02 RX ADMIN — GABAPENTIN 300 MG: 100 CAPSULE ORAL at 22:24

## 2019-06-02 RX ADMIN — MIDAZOLAM HYDROCHLORIDE 2 MG: 1 INJECTION, SOLUTION INTRAMUSCULAR; INTRAVENOUS at 10:19

## 2019-06-02 RX ADMIN — HEPARIN SODIUM 5000 UNITS: 5000 INJECTION INTRAVENOUS; SUBCUTANEOUS at 15:08

## 2019-06-02 RX ADMIN — INSULIN LISPRO 12 UNITS: 100 INJECTION, SOLUTION INTRAVENOUS; SUBCUTANEOUS at 22:29

## 2019-06-02 NOTE — PROGRESS NOTES
TRANSFER - IN REPORT:    Verbal report received from Brynn RN(name) on Primitivo Pick  being received from PACU(unit) for routine progression of care      Report consisted of patients Situation, Background, Assessment and   Recommendations(SBAR). Information from the following report(s) SBAR, Kardex and Cardiac Rhythm SR was reviewed with the receiving nurse. Opportunity for questions and clarification was provided. Assessment completed upon patients arrival to unit and care assumed.

## 2019-06-02 NOTE — PROGRESS NOTES
TRANSFER - OUT REPORT:    Verbal report given to Pablo Archibald RN(name) on Esther Morales  being transferred to [facility] for routine progression of care       Report consisted of patients Situation, Background, Assessment and   Recommendations(SBAR). Information from the following report(s) SBAR, Kardex and Cardiac Rhythm SR was reviewed with the receiving nurse. Lines:   Peripheral IV 06/01/19 Left Forearm (Active)   Site Assessment Clean, dry, & intact 6/2/2019 11:29 AM   Phlebitis Assessment 0 6/2/2019 11:29 AM   Infiltration Assessment 0 6/2/2019 11:29 AM   Dressing Status Clean, dry, & intact 6/2/2019 11:29 AM   Dressing Type Transparent;Tape 6/2/2019 11:29 AM   Hub Color/Line Status Pink;Capped 6/2/2019 11:29 AM   Action Taken Open ports on tubing capped 6/2/2019 11:29 AM   Alcohol Cap Used Yes 6/2/2019 11:29 AM       Peripheral IV 06/02/19 Anterior;Left;Proximal Forearm (Active)        Opportunity for questions and clarification was provided.       Patient transported with:   Registered Nurse  Tech

## 2019-06-02 NOTE — ROUTINE PROCESS
Bedside and Verbal shift change report given to [facility] RN (oncoming nurse) by Conchis Austin RN (offgoing nurse). Report included the following information SBAR, Intake/Output, MAR, Recent Results and Cardiac Rhythm SR.     0000 Pt NPO at midnight. Consent for procedure obtained and in chart. CHG bath given with linen and gown change. Preprocedural checklist completed. 0600 heparin held prior to procedure per protocol. Bedside and Verbal shift change report given to Doctor Sharon Rothman (oncoming nurse) by [facility] RN (offgoing nurse).  Report included the following information SBAR, Intake/Output, MAR, Recent Results and Cardiac Rhythm SR.

## 2019-06-02 NOTE — PERIOP NOTES
TRANSFER - IN REPORT:    Verbal report received from Tiana Evangelista RN(name) on Sofía Currie  being received from 3022(unit) for ordered procedure      Report consisted of patients Situation, Background, Assessment and   Recommendations(SBAR). Information from the following report(s) Pre Procedure Checklist, Procedure Verification and Quality Measures was reviewed with the receiving nurse. Opportunity for questions and clarification was provided. Assessment completed upon patients arrival to unit and care assumed.

## 2019-06-02 NOTE — PROGRESS NOTES
Problem: Pain  Goal: *Control of Pain  Outcome: Progressing Towards Goal     Problem: Falls - Risk of  Goal: *Absence of falls  Outcome: Progressing Towards Goal

## 2019-06-02 NOTE — PROGRESS NOTES
Bedside shift change report given to this RN (oncoming nurse) by [facility], RN (offgoing nurse).  Report included the following information SBAR, Kardex and Cardiac Rhythm SR.

## 2019-06-02 NOTE — BRIEF OP NOTE
BRIEF OPERATIVE NOTE    Date of Procedure: 6/2/2019   Preoperative Diagnosis: Osteomyelitis and cellulitis of the right fith metatarsal  Postoperative Diagnosis: Osteomyelitis and cellulitis of the right fith metatarsal    Procedure(s):  RIGHT 5TH METATARSAL HEAD AND BASE OF PHALANX RESECTION. ALONG WITH BONE BIOPSY OF THE 5TH METATARSAL AND PROXIMAL PHALANX. FLAP CLOSURE. Surgeon(s) and Role:     * Jessica Mckeon DPM - Primary    Surgical Staff:  Circ-1: Moisés Noble  Scrub Tech-1: Isaiah Marshall  Surg Asst-1: Rocael Arevalo  Event Time In Time Out   Incision Start 06/02/2019 1036    Incision Close 06/02/2019 1117      Anesthesia: MAC   Estimated Blood Loss: MINIMAL   Specimens:   ID Type Source Tests Collected by Time Destination   1 :     Jessica Mckeon DPM 6/2/2019 1109 Pathology      Findings: soft 5th metatarsal head. Complications: none  Implants: none    Patient tolerated all procedures well and discharged in stable condition with all vascular return - tourniquet never used.      Suture 2.0 nylon     Drain TLS # 10    [id number]

## 2019-06-02 NOTE — PERIOP NOTES
Recd care of pt from OR via bed. Resp even and unlabored. Attached to monitor. Blood pressure noted to be low. Landon Alan CRNA at bedside. Will cont to monitor. 1130  Accu check 134. No coverage needed. 1135  IV started left inner forearm #22 gauge. Tolerated well. IV fluids started LR at 100ml/hr for blood pressure management. Will cont to monitor. Pt denies pain or discomforts. 1225  TRANSFER - OUT REPORT:    Verbal report given to Chanda Raymond RN (name) on Tea Gold  being transferred to [facility] (unit) for routine post - op       Report consisted of patients Situation, Background, Assessment and   Recommendations(SBAR). Information from the following report(s) SBAR, OR Summary, MAR, Recent Results and Cardiac Rhythm sinus rhythm was reviewed with the receiving nurse. Lines:   Peripheral IV 06/01/19 Left Forearm (Active)   Site Assessment Clean, dry, & intact 6/2/2019 11:29 AM   Phlebitis Assessment 0 6/2/2019 11:29 AM   Infiltration Assessment 0 6/2/2019 11:29 AM   Dressing Status Clean, dry, & intact 6/2/2019 11:29 AM   Dressing Type Transparent;Tape 6/2/2019 11:29 AM   Hub Color/Line Status Pink;Capped 6/2/2019 11:29 AM   Action Taken Open ports on tubing capped 6/2/2019 11:29 AM   Alcohol Cap Used Yes 6/2/2019 11:29 AM       Peripheral IV 06/02/19 Anterior;Left;Proximal Forearm (Active)        Opportunity for questions and clarification was provided.       Patient transported with:   [employer]

## 2019-06-02 NOTE — PROGRESS NOTES
Internal Medicine Progress Note        NAME: Jeri Phillips   :  1975  MRM:  [id number]    Date/Time: 2019        ASSESSMENT/PLAN:  Gadiel Krueger to OR while writing this note. MRI with osteo. #  Sepsis ([facility] [address] (2019) , likely from foot infection. Iv vanco and zosyn. Chemistry not available yet. Blood and ulcer CS. ID consulted. Tele bed. Maintain vital signs. IVF. Lactic acid. Presented with hypotension and tachycardia  - blood C/S , one bottle positive for GPC, anaerobic positive for  BHS      # Dehydration. IVF. Strict I/O      #   Right foot ulcer, with necrosis of muscle ([facility] [address] (10/18/2018). Podiatrist consulted. Wound care     #   Osteomyelitis of right foot ([facility] [address] (2019). MRI positive for  OM. Possible the source of the infection. ID and podiatry consulted. As above. Surgery anticipated       #   Left arm weakness (2019) from previous injury in his neck. Stable.      # DM . Provide SSI, hypoglycemia protocol and frequent Accu checks.      # Leukopenia. trend     -DVT prophylaxis :  heparin.   - Code Status : FULL      Lab Review:     Recent Labs     19  0430 19  0420 19  0445   WBC 3.6* 3.4* 3.5*   HGB 10.0* 10.0* 9.9*   HCT 32.6* 31.9* 32.1*    329 346     Recent Labs     19  0430 19  0420 19  0445    137 137   K 4.2 4.5 4.5    101 101   CO2 30 31 29   * 224* 257*   BUN 17 10 12   CREA 0.95 0.93 1.24   CA 8.8 8.6 8.1*   MG  --   --  2.0   PHOS  --   --  3.6     Lab Results   Component Value Date/Time    Glucose (POC) 180 (H) 2019 07:26 AM    Glucose (POC) 165 (H) 2019 04:58 AM    Glucose (POC) 357 (H) 2019 09:32 PM    Glucose (POC) 182 (H) 2019 04:36 PM    Glucose (POC) 257 (H) 2019 11:15 AM    Glucose, POC 75 2019 12:25 PM     No results for input(s): PH, PCO2, PO2, HCO3, FIO2 in the last 72 hours. No results for input(s): INR in the last 72 hours.     No lab exists for component: INREXT, INREXT    No results found for: SDES  Lab Results   Component Value Date/Time    Culture result: NO GROWTH 4 DAYS 05/29/2019 12:25 PM    Culture result: (A) 05/29/2019 12:15 PM     ANAEROBIC BOTTLE STREPTOCOCCI, BETA HEMOLYTIC GROUP C           Subjective:     Chief Complaint:      No acute issue, foot same. Not sure about plan for his surgery     ROS:  (bold if positive,otherwise negative)    Fever/chills ,  Dysuria   Cough , Sputum , SOB/ONEAL , Chest Pain     Diarrhea ,Nausea/Vomit , Abd Pain , Constipation     Tolerating Diet                Objective:     Vitals:  Last 24hrs VS reviewed since prior progress note. Most recent are:    Visit Vitals  /69   Pulse 86   Temp 98 °F (36.7 °C)   Resp 18   Ht 5' 6\" (1.676 m)   Wt 68 kg (150 lb)   SpO2 94%   BMI 24.21 kg/m²     SpO2 Readings from Last 6 Encounters:   06/02/19 94%   11/15/18 98%   11/01/18 100%   10/18/18 100%   02/23/13 99%            Intake/Output Summary (Last 24 hours) at 6/2/2019 1130  Last data filed at 6/2/2019 1114  Gross per 24 hour   Intake 450 ml   Output 1875 ml   Net -1425 ml          Physical Exam:   Gen:  Appear stated age, Well-developed, well-nourished, in no acute distress  HEENT:  Head atraumatic, normocephalic , hearing intact to voice, moist mucous membranes. Neck:  Supple, no masses I appreciate, thyroid non-tender. Resp:  No accessory muscle use,Bilateral BS present, clear breath sounds without wheezes rales or rhonchi  Card:  No murmurs, normal S1, S2 without thrills, bruits or peripheral edema. Abd:  Soft, non-tender, non-distended, normoactive bowel sounds are present, no palpable organomegaly    Musc:  No cyanosis or clubbing. Skin: r foot ulcers x2  . RIGHT foot with necrotic tissue distal lateral 5th metatarsal head both plantar ulcer and lateral. skin turgor is good. Neuro: L UL weakness   Cranial nerves are grossly intact,  follows commands appropriately. alert.      Medications Reviewed: (see below)    Lab Data Reviewed: (see below)    ______________________________________________________________________    Medications:     Current Facility-Administered Medications   Medication Dose Route Frequency    bacitracin 50,000 Units, vancomycin 1 g, polymyxin B 500,000 Units in 0.9% sodium chloride 3,000 mL Irrigation    PRN    lidocaine (XYLOCAINE) 10 mg/mL (1 %) injection    PRN    bupivacaine (PF) (MARCAINE) 0.5 % (5 mg/mL) injection    PRN    VANCOMYCIN INFORMATION NOTE   Other ONCE    insulin glargine (LANTUS) injection 6 Units  6 Units SubCUTAneous BID    dextrose 10 % infusion 125-250 mL  125-250 mL IntraVENous PRN    vancomycin (VANCOCIN) 1,250 mg in 0.9% sodium chloride 250 mL IVPB  1,250 mg IntraVENous Q12H    sodium chloride (NS) flush 5-10 mL  5-10 mL IntraVENous PRN    aspirin chewable tablet 81 mg  81 mg Oral DAILY    calcium-vitamin D 600 mg(1,500mg) -200 unit per tablet 1 Tab  1 Tab Oral DAILY    gabapentin (NEURONTIN) capsule 300 mg  300 mg Oral TID    simvastatin (ZOCOR) tablet 40 mg  40 mg Oral QHS    0.45% sodium chloride infusion  75 mL/hr IntraVENous CONTINUOUS    heparin (porcine) injection 5,000 Units  5,000 Units SubCUTAneous Q8H    insulin lispro (HUMALOG) injection   SubCUTAneous AC&HS    glucose chewable tablet 16 g  4 Tab Oral PRN    glucagon (GLUCAGEN) injection 1 mg  1 mg IntraMUSCular PRN    VANCOMYCIN INFORMATION NOTE   Other Rx Dosing/Monitoring    piperacillin-tazobactam (ZOSYN) 3.375 g in 0.9% sodium chloride (MBP/ADV) 100 mL \"Extended 4 Hours Infusion ####\"   3.375 g IntraVENous Q8H     Facility-Administered Medications Ordered in Other Encounters   Medication Dose Route Frequency    midazolam (VERSED) injection   IntraVENous PRN    fentaNYL citrate (PF) injection   IntraVENous PRN    propofol (DIPRIVAN) 10 mg/mL injection   IntraVENous PRN    propofol (DIPRIVAN) 10 mg/mL injection   IntraVENous CONTINUOUS    lactated Ringers infusion   IntraVENous CONTINUOUS    PHENYLephrine 100 mcg/mL 10 mL syringe (ONE-STEP)   IntraVENous CONTINUOUS    lidocaine (PF) (XYLOCAINE) 20 mg/mL (2 %) injection   IntraVENous PRN          Total time spent with patient: 25 minutes                  Care Plan discussed with: Patient and Nursing Staff    Discussed:  Care Plan    Prophylaxis:  Hep SQ    Disposition:  Home w/Family             Attending Physician: Tanya Arias MD

## 2019-06-02 NOTE — ANESTHESIA PREPROCEDURE EVALUATION
Relevant Problems   No relevant active problems       Anesthetic History   No history of anesthetic complications            Review of Systems / Medical History  Patient summary reviewed and pertinent labs reviewed    Pulmonary          Smoker         Neuro/Psych              Cardiovascular                       GI/Hepatic/Renal                Endo/Other    Diabetes: type 2, using insulin         Other Findings              Physical Exam    Airway  Mallampati: II  TM Distance: 4 - 6 cm  Neck ROM: normal range of motion   Mouth opening: Normal     Cardiovascular    Rhythm: regular  Rate: normal         Dental    Dentition: Poor dentition     Pulmonary  Breath sounds clear to auscultation               Abdominal  GI exam deferred       Other Findings            Anesthetic Plan    ASA: 2  Anesthesia type: MAC            Anesthetic plan and risks discussed with: Patient

## 2019-06-02 NOTE — ANESTHESIA POSTPROCEDURE EVALUATION
Procedure(s):  RIGHT 5TH METATARSAL AND PHALANX RESECTION. MAC    Anesthesia Post Evaluation      Multimodal analgesia: multimodal analgesia used between 6 hours prior to anesthesia start to PACU discharge  Patient location during evaluation: bedside  Patient participation: complete - patient participated  Level of consciousness: awake  Pain management: adequate  Airway patency: patent  Anesthetic complications: no  Cardiovascular status: stable  Respiratory status: acceptable  Hydration status: acceptable  Post anesthesia nausea and vomiting:  controlled      Vitals Value Taken Time   BP 90/59 6/2/2019 11:53 AM   Temp 36.9 °C (98.4 °F) 6/2/2019 11:34 AM   Pulse 74 6/2/2019 11:54 AM   Resp 9 6/2/2019 11:54 AM   SpO2 98 % 6/2/2019 11:54 AM   Vitals shown include unvalidated device data.

## 2019-06-03 LAB
ANION GAP SERPL CALC-SCNC: 7 MMOL/L (ref 3–18)
BASOPHILS # BLD: 0 K/UL (ref 0–0.1)
BASOPHILS NFR BLD: 0 % (ref 0–2)
BUN SERPL-MCNC: 16 MG/DL (ref 7–18)
BUN/CREAT SERPL: 14 (ref 12–20)
CALCIUM SERPL-MCNC: 8.2 MG/DL (ref 8.5–10.1)
CHLORIDE SERPL-SCNC: 102 MMOL/L (ref 100–108)
CO2 SERPL-SCNC: 30 MMOL/L (ref 21–32)
CREAT SERPL-MCNC: 1.17 MG/DL (ref 0.6–1.3)
DIFFERENTIAL METHOD BLD: ABNORMAL
EOSINOPHIL # BLD: 0.2 K/UL (ref 0–0.4)
EOSINOPHIL NFR BLD: 3 % (ref 0–5)
ERYTHROCYTE [DISTWIDTH] IN BLOOD BY AUTOMATED COUNT: 12.8 % (ref 11.6–14.5)
GLUCOSE BLD STRIP.AUTO-MCNC: 189 MG/DL (ref 70–110)
GLUCOSE BLD STRIP.AUTO-MCNC: 250 MG/DL (ref 70–110)
GLUCOSE BLD STRIP.AUTO-MCNC: 390 MG/DL (ref 70–110)
GLUCOSE BLD STRIP.AUTO-MCNC: 398 MG/DL (ref 70–110)
GLUCOSE SERPL-MCNC: 167 MG/DL (ref 74–99)
HCT VFR BLD AUTO: 31 % (ref 36–48)
HGB BLD-MCNC: 9.5 G/DL (ref 13–16)
LYMPHOCYTES # BLD: 1.7 K/UL (ref 0.9–3.6)
LYMPHOCYTES NFR BLD: 34 % (ref 21–52)
MAGNESIUM SERPL-MCNC: 2.3 MG/DL (ref 1.6–2.6)
MCH RBC QN AUTO: 28.7 PG (ref 24–34)
MCHC RBC AUTO-ENTMCNC: 30.6 G/DL (ref 31–37)
MCV RBC AUTO: 93.7 FL (ref 74–97)
MONOCYTES # BLD: 0.4 K/UL (ref 0.05–1.2)
MONOCYTES NFR BLD: 8 % (ref 3–10)
NEUTS SEG # BLD: 2.6 K/UL (ref 1.8–8)
NEUTS SEG NFR BLD: 55 % (ref 40–73)
PHOSPHATE SERPL-MCNC: 4 MG/DL (ref 2.5–4.9)
PLATELET # BLD AUTO: 286 K/UL (ref 135–420)
PMV BLD AUTO: 10.4 FL (ref 9.2–11.8)
POTASSIUM SERPL-SCNC: 4 MMOL/L (ref 3.5–5.5)
RBC # BLD AUTO: 3.31 M/UL (ref 4.7–5.5)
SODIUM SERPL-SCNC: 139 MMOL/L (ref 136–145)
WBC # BLD AUTO: 4.9 K/UL (ref 4.6–13.2)

## 2019-06-03 PROCEDURE — 80048 BASIC METABOLIC PNL TOTAL CA: CPT

## 2019-06-03 PROCEDURE — 74011000258 HC RX REV CODE- 258: Performed by: PODIATRIST

## 2019-06-03 PROCEDURE — 74011250636 HC RX REV CODE- 250/636: Performed by: PODIATRIST

## 2019-06-03 PROCEDURE — 84100 ASSAY OF PHOSPHORUS: CPT

## 2019-06-03 PROCEDURE — 74011250637 HC RX REV CODE- 250/637: Performed by: PODIATRIST

## 2019-06-03 PROCEDURE — 97116 GAIT TRAINING THERAPY: CPT

## 2019-06-03 PROCEDURE — 74011250637 HC RX REV CODE- 250/637: Performed by: HOSPITALIST

## 2019-06-03 PROCEDURE — 85025 COMPLETE CBC W/AUTO DIFF WBC: CPT

## 2019-06-03 PROCEDURE — 74011636637 HC RX REV CODE- 636/637: Performed by: PODIATRIST

## 2019-06-03 PROCEDURE — 74011250636 HC RX REV CODE- 250/636: Performed by: HOSPITALIST

## 2019-06-03 PROCEDURE — 36415 COLL VENOUS BLD VENIPUNCTURE: CPT

## 2019-06-03 PROCEDURE — 65660000000 HC RM CCU STEPDOWN

## 2019-06-03 PROCEDURE — 97162 PT EVAL MOD COMPLEX 30 MIN: CPT

## 2019-06-03 PROCEDURE — 97530 THERAPEUTIC ACTIVITIES: CPT

## 2019-06-03 PROCEDURE — 83735 ASSAY OF MAGNESIUM: CPT

## 2019-06-03 PROCEDURE — 74011636637 HC RX REV CODE- 636/637: Performed by: HOSPITALIST

## 2019-06-03 PROCEDURE — 97165 OT EVAL LOW COMPLEX 30 MIN: CPT

## 2019-06-03 PROCEDURE — 82962 GLUCOSE BLOOD TEST: CPT

## 2019-06-03 RX ORDER — INSULIN LISPRO 100 [IU]/ML
2 INJECTION, SOLUTION INTRAVENOUS; SUBCUTANEOUS
Status: DISCONTINUED | OUTPATIENT
Start: 2019-06-03 | End: 2019-06-04

## 2019-06-03 RX ORDER — INSULIN GLARGINE 100 [IU]/ML
7 INJECTION, SOLUTION SUBCUTANEOUS 2 TIMES DAILY
Status: DISCONTINUED | OUTPATIENT
Start: 2019-06-03 | End: 2019-06-04

## 2019-06-03 RX ORDER — ACETAMINOPHEN 325 MG/1
650 TABLET ORAL
Status: DISCONTINUED | OUTPATIENT
Start: 2019-06-03 | End: 2019-06-06 | Stop reason: HOSPADM

## 2019-06-03 RX ORDER — SODIUM CHLORIDE 9 MG/ML
1000 INJECTION, SOLUTION INTRAVENOUS ONCE
Status: COMPLETED | OUTPATIENT
Start: 2019-06-03 | End: 2019-06-03

## 2019-06-03 RX ADMIN — SIMVASTATIN 40 MG: 40 TABLET, FILM COATED ORAL at 21:58

## 2019-06-03 RX ADMIN — INSULIN LISPRO 15 UNITS: 100 INJECTION, SOLUTION INTRAVENOUS; SUBCUTANEOUS at 13:16

## 2019-06-03 RX ADMIN — SODIUM CHLORIDE 1000 ML: 900 INJECTION, SOLUTION INTRAVENOUS at 15:31

## 2019-06-03 RX ADMIN — GABAPENTIN 300 MG: 100 CAPSULE ORAL at 10:44

## 2019-06-03 RX ADMIN — HEPARIN SODIUM 5000 UNITS: 5000 INJECTION INTRAVENOUS; SUBCUTANEOUS at 21:58

## 2019-06-03 RX ADMIN — Medication 1 TABLET: at 10:44

## 2019-06-03 RX ADMIN — INSULIN GLARGINE 6 UNITS: 100 INJECTION, SOLUTION SUBCUTANEOUS at 09:15

## 2019-06-03 RX ADMIN — MELATONIN TAB 3 MG 3 MG: 3 TAB at 00:51

## 2019-06-03 RX ADMIN — INSULIN LISPRO 15 UNITS: 100 INJECTION, SOLUTION INTRAVENOUS; SUBCUTANEOUS at 22:38

## 2019-06-03 RX ADMIN — INSULIN LISPRO 2 UNITS: 100 INJECTION, SOLUTION INTRAVENOUS; SUBCUTANEOUS at 18:27

## 2019-06-03 RX ADMIN — SODIUM CHLORIDE 1250 MG: 900 INJECTION, SOLUTION INTRAVENOUS at 19:24

## 2019-06-03 RX ADMIN — HEPARIN SODIUM 5000 UNITS: 5000 INJECTION INTRAVENOUS; SUBCUTANEOUS at 06:37

## 2019-06-03 RX ADMIN — GABAPENTIN 300 MG: 100 CAPSULE ORAL at 21:58

## 2019-06-03 RX ADMIN — SODIUM CHLORIDE 1250 MG: 900 INJECTION, SOLUTION INTRAVENOUS at 05:00

## 2019-06-03 RX ADMIN — GABAPENTIN 300 MG: 100 CAPSULE ORAL at 16:26

## 2019-06-03 RX ADMIN — ASPIRIN 81 MG 81 MG: 81 TABLET ORAL at 10:44

## 2019-06-03 RX ADMIN — HEPARIN SODIUM 5000 UNITS: 5000 INJECTION INTRAVENOUS; SUBCUTANEOUS at 14:19

## 2019-06-03 RX ADMIN — PIPERACILLIN SODIUM,TAZOBACTAM SODIUM 3.38 G: 3; .375 INJECTION, POWDER, FOR SOLUTION INTRAVENOUS at 21:56

## 2019-06-03 RX ADMIN — INSULIN LISPRO 2 UNITS: 100 INJECTION, SOLUTION INTRAVENOUS; SUBCUTANEOUS at 13:16

## 2019-06-03 RX ADMIN — SODIUM CHLORIDE 75 ML/HR: 450 INJECTION, SOLUTION INTRAVENOUS at 09:17

## 2019-06-03 RX ADMIN — PIPERACILLIN SODIUM,TAZOBACTAM SODIUM 3.38 G: 3; .375 INJECTION, POWDER, FOR SOLUTION INTRAVENOUS at 06:37

## 2019-06-03 RX ADMIN — INSULIN LISPRO 3 UNITS: 100 INJECTION, SOLUTION INTRAVENOUS; SUBCUTANEOUS at 09:14

## 2019-06-03 RX ADMIN — INSULIN GLARGINE 7 UNITS: 100 INJECTION, SOLUTION SUBCUTANEOUS at 21:57

## 2019-06-03 RX ADMIN — PIPERACILLIN SODIUM,TAZOBACTAM SODIUM 3.38 G: 3; .375 INJECTION, POWDER, FOR SOLUTION INTRAVENOUS at 14:21

## 2019-06-03 RX ADMIN — INSULIN LISPRO 9 UNITS: 100 INJECTION, SOLUTION INTRAVENOUS; SUBCUTANEOUS at 18:26

## 2019-06-03 NOTE — OP NOTES
[facility]  OPERATIVE REPORT    Name:  Domenica Roman  MR#:   [id number]  :  1975  ACCOUNT #:  [de-identified]  DATE OF SERVICE:  2019    PREOPERATIVE DIAGNOSES:  Right foot cellulitis, rule out osteomyelitis of the fifth metatarsal head and fifth proximal phalanx. POSTOPERATIVE DIAGNOSES:  Right foot cellulitis, rule out osteomyelitis of the fifth metatarsal head and fifth proximal phalanx. PROCEDURES PERFORMED:  1. Right foot fifth metatarsal head resection along with base of the proximal phalanx. 2.  Second pass bone biopsies of the fifth metatarsal and proximal phalanx respectively. 3.  Flap closure. SURGEON:  Toya Mcgovern DPM      ANESTHESIA:  IV sedation with a right ankle block with anesthesia. COMPLICATIONS:  None. SPECIMENS REMOVED:  Tissue and bone. IMPLANTS:  none. HEMOSTASIS/ESTIMATED BLOOD LOSS:  Minimal.    INDICATIONS FOR SURGERY:  The patient is a delightful 27-year-old male, who presents with a chronic history of right foot ulceration which did not respond to prior conservative and intense local wound care treatment program, this time presents with radiographic and MRI evidence suggestive of osteomyelitis. PROCEDURE:  The patient was brought to the operating, left on his hospital bed. He received a right ankle block of anesthesia. His right foot was then prepped, scrubbed and draped in a normal sterile fashion. After it was determined he was adequately anesthetized, attention was addressed to the dorsal lateral aspect of his right foot where the proximal dorsal lateral ulceration was totally excised out. This was sent off to cultures and sensitivities were performed at this level. The incision was brought out distally via dorsal laterally to the proximal interphalangeal joint. This incision was deepened via sharp dissection down to fifth metatarsal.  The fifth metatarsal head was exposed.   The TPS saw was utilized to remove the fifth metatarsal head from lateral to medial and access oriented from dorsal distal to plantar proximal.  The head of the fifth metatarsal was noted to be soft and was passed off to be sent off for pathological analysis to rule out osteomyelitis along with cultures done from the medullary base of the bone. Attention was addressed to the base of the proximal phalanx. The TPS saw was utilized from lateral to medial removing the base of the proximal phalanx. This was also sent off as a separate specimen to rule out osteomyelitis of the proximal phalanx base. Attention was then addressed back to the metatarsal where a second pass biopsies were done of the fifth metatarsal, again from distal dorsal to plantar proximal being cut from lateral to medial.  This wedge was then also sent off again as a separate specimen labeled as second pass bone biopsy. Attention was then addressed to the fifth toe again and the base at the proximal phalanx and another pass was made again towards base of the proximal phalanx removing a specimen again, labeled as second pass sent off for rule out osteomyelitis. The area was then flushed with copious amounts of normal saline, total of 3 L with polymyxin, bacitracin and vancomycin in the saline. New field laid down. Instruments were changed out and sterile gloves were changed out. With the new instrumentation, the area was then re-explored. The plantar ulcer was then excised. A flap was raised from plantar laterally and was rotated back proximally and dorsally, and for closure of that wound from the dorsal aspect, the distal plantar was brought back towards the closing of the plantar ulceration in the lateral insertion. The tissues were remodeled. Sutures were using the combination of vertical mattress in simple interrupted for skin closure. It was closed over a TLS size 10 drain. Drain was sutured in. Tourniquet was never used.   He continued to have good flow to the flap to the foot. He was placed in a dry sterile bulky dressing. Discharged to recovery in stable condition.       JENNIFER Flanagan/LUIS [id number]  D:  06/02/2019 11:56  T:  06/02/2019 15:11  JOB #:  [id number]

## 2019-06-03 NOTE — PROGRESS NOTES
Patient received in bed awake. Patient A&Ox4, denies pain and discomfort. No distress noted. Frequently use items within reach. Bed locked in low position. Call bell within reach and Patient verbalized understanding of use for assistance and needs. [address]- Dr. Jennifer Conway to nsg station made aware of BG level 398; reminded of s/s coverage 15 units and meal time insulin 2 units in place; voiced understanding. See MAR.    9778- Patient awake, was asked if he would like Diabetic teaching about Hyperglycemia and Hypoglycemia. Patient refused said \"I'm alright, I've had different people come by and talk to me; thanks. \"    [phone]- This nurse informed by CNA of Patient's BP 93/55, rechecked 95/60. This nurse to bedside. Patient awake, denies having lightheadedness, dizziness and is asymptomatic. BP taken manually 92/60. Dr. Jennifer Conway was called made aware. T.O. received for NS bolus to infuse for 2 hours at rate 500 ml/hr.  Then after NS bolus recheck VS and call if BP less than previous ranges (RBV)

## 2019-06-03 NOTE — DIABETES MGMT
GLYCEMIC CONTROL AND NUTRITION    Assessment/Recommendations:  Fasting lab glucose this am 167 mg/dl  Lantus increased to 7 units bid. Mealtime lispro added 2u units 3 times daily with meals. Continue corrective insulin coverage as ordered. Pt already receiving the very insulin resistant scale. Will continue inpatient monitoring. Most recent blood glucose values:  Results for Talat Mcdermott (MRN [id number]) as of 6/3/2019 12:44   Ref. Range 6/2/2019 11:32 6/2/2019 16:16 6/2/2019 21:17 6/3/2019 07:00 6/3/2019 11:52   GLUCOSE,FAST - POC Latest Ref Range: 70 - 110 mg/dL 134 (H) 226 (H) 300 (H) 189 (H) 398 (H)     Current A1C of 8.5 % is equivalent to average blood glucose of 197 mg/dl over the past 2-3 months. Current hospital diabetes medications:   Lantus 6 units bid  Lispro corrective insulin coverage AC&HS  Previous day's insulin requirements:   Lantus 12 units  Lispro 18 units corrective insulin   Home diabetes medications:  Basaglar 28 units every morning  Novolog 10 units 3 times daily with meals. Diet:    Diabetic consistent carb.  HS snack  Education:  __x_Refer to Diabetes Education Record             ____Education not indicated at this time      Aga Cheema [facility] CDE  Ext [phone]

## 2019-06-03 NOTE — PROGRESS NOTES
conducted a Follow up consultation and Spiritual Assessment for Sabi Colorado, who is a 39 y.o.,male. The  provided the following Interventions:  Continued the relationship of care and support. Listened empathically. Offered prayer and assurance of continued prayer on patients behalf. Chart reviewed. The following outcomes were achieved:  Patient expressed gratitude for pastoral care visit. Assessment:  There are no further spiritual or Anabaptism issues which require Spiritual Care Services interventions at this time. Plan:  Chaplains will continue to follow and will provide pastoral care on an as needed/requested basis.  recommends bedside caregivers page  on duty if patient shows signs of acute spiritual or emotional distress.      [address] [facility]   Staff [address] [facility]   [phone]

## 2019-06-03 NOTE — PROGRESS NOTES
Internal Medicine Progress Note        NAME: Mohsen Pike   :  1975  MRM:  [id number]    Date/Time: 6/3/2019        ASSESSMENT/PLAN:        #  Sepsis ([facility] [address] (2019) , likely from foot infection. Iv vanco and zosyn. Chemistry not available yet. Blood and ulcer CS. ID consulted. Tele bed. Maintain vital signs. IVF. Lactic acid. Presented with hypotension and tachycardia  - blood C/S , one bottle positive for GPC, anaerobic positive for  BHS      # Dehydration. IVF. Strict I/O      #   Right foot ulcer, with necrosis of muscle ([facility] [address] (10/18/2018). Podiatrist consulted. Wound care     #   Osteomyelitis of right foot ([facility] [address] (2019). MRI positive for  OM. Possible the source of the infection. ID and podiatry consulted. As above. Pt had surgery by Dr. Antonia Gomez on  (right 5th metatarsal head resection)     #   Left arm weakness (2019) from previous injury in his neck. Stable.      # DM . Provide SSI, hypoglycemia protocol and frequent Accu checks.      # Leukopenia. trend     -DVT prophylaxis :  heparin.   - Code Status : FULL      Lab Review:     Recent Labs     19  0438 19  0430 19  0420   WBC 4.9 3.6* 3.4*   HGB 9.5* 10.0* 10.0*   HCT 31.0* 32.6* 31.9*    320 329     Recent Labs     19  0438 19  0430 19  0420    137 137   K 4.0 4.2 4.5    100 101   CO2 30 30 31   * 188* 224*   BUN 16 17 10   CREA 1.17 0.95 0.93   CA 8.2* 8.8 8.6   MG 2.3  --   --    PHOS 4.0  --   --      Lab Results   Component Value Date/Time    Glucose (POC) 398 (H) 2019 11:52 AM    Glucose (POC) 189 (H) 2019 07:00 AM    Glucose (POC) 300 (H) 2019 09:17 PM    Glucose (POC) 226 (H) 2019 04:16 PM    Glucose (POC) 134 (H) 2019 11:32 AM    Glucose, POC 75 2019 12:25 PM     No results for input(s): PH, PCO2, PO2, HCO3, FIO2 in the last 72 hours. No results for input(s): INR in the last 72 hours.     No lab exists for component: INREXT, INREXT    No results found for: SDES  Lab Results   Component Value Date/Time    Culture result: NO GROWTH AFTER 16 HOURS 06/02/2019 12:06 PM    Culture result: CULTURE IN [address] UPDATES TO FOLLOW 06/02/2019 12:06 PM    Culture result: NO GROWTH 5 DAYS 05/29/2019 12:25 PM           Subjective:     Chief Complaint:      No acute issue, foot same. D/w family at bedside          Objective:     Vitals:  Last 24hrs VS reviewed since prior progress note. Most recent are:    Visit Vitals  /71   Pulse 83   Temp 98.3 °F (36.8 °C)   Resp 18   Ht 5' 6\" (1.676 m)   Wt 68 kg (150 lb)   SpO2 94%   BMI 24.21 kg/m²     SpO2 Readings from Last 6 Encounters:   06/03/19 94%   11/15/18 98%   11/01/18 100%   10/18/18 100%   02/23/13 99%            Intake/Output Summary (Last 24 hours) at 6/3/2019 1357  Last data filed at 6/3/2019 8293  Gross per 24 hour   Intake 1040 ml   Output 2602 ml   Net -1562 ml          Physical Exam:   Gen:  Appear stated age, Well-developed, well-nourished, in no acute distress  HEENT:  Head atraumatic, normocephalic , hearing intact to voice, moist mucous membranes. Neck:  Supple, no masses I appreciate, thyroid non-tender. Resp:  No accessory muscle use,Bilateral BS present, clear breath sounds without wheezes rales or rhonchi  Card:  No murmurs, normal S1, S2 without thrills, bruits or peripheral edema. Abd:  Soft, non-tender, non-distended, normoactive bowel sounds are present, no palpable organomegaly    Musc:  No cyanosis or clubbing. Skin: right foot wrapped in ACE, skin turgor is good. Neuro:  Cranial nerves are grossly intact,  follows commands appropriately. alert.      Medications Reviewed: (see below)    Lab Data Reviewed: (see below)    ______________________________________________________________________    Medications:     Current Facility-Administered Medications   Medication Dose Route Frequency    insulin glargine (LANTUS) injection 7 Units  7 Units SubCUTAneous BID    insulin lispro (HUMALOG) injection 2 Units  2 Units SubCUTAneous TIDAC    acetaminophen (TYLENOL) tablet 650 mg  650 mg Oral Q6H PRN    [START ON 6/4/2019] VANCOMYCIN INFORMATION NOTE   Other ONCE    melatonin tablet 3 mg  3 mg Oral QHS PRN    dextrose 10 % infusion 125-250 mL  125-250 mL IntraVENous PRN    vancomycin (VANCOCIN) 1,250 mg in 0.9% sodium chloride 250 mL IVPB  1,250 mg IntraVENous Q12H    sodium chloride (NS) flush 5-10 mL  5-10 mL IntraVENous PRN    aspirin chewable tablet 81 mg  81 mg Oral DAILY    calcium-vitamin D 600 mg(1,500mg) -200 unit per tablet 1 Tab  1 Tab Oral DAILY    gabapentin (NEURONTIN) capsule 300 mg  300 mg Oral TID    simvastatin (ZOCOR) tablet 40 mg  40 mg Oral QHS    0.45% sodium chloride infusion  75 mL/hr IntraVENous CONTINUOUS    heparin (porcine) injection 5,000 Units  5,000 Units SubCUTAneous Q8H    insulin lispro (HUMALOG) injection   SubCUTAneous AC&HS    glucose chewable tablet 16 g  4 Tab Oral PRN    glucagon (GLUCAGEN) injection 1 mg  1 mg IntraMUSCular PRN    VANCOMYCIN INFORMATION NOTE   Other Rx Dosing/Monitoring    piperacillin-tazobactam (ZOSYN) 3.375 g in 0.9% sodium chloride (MBP/ADV) 100 mL \"Extended 4 Hours Infusion ####\"   3.375 g IntraVENous Q8H          Total time spent with patient: 25 minutes                  Care Plan discussed with: Patient and Nursing Staff    Discussed:  Care Plan    Prophylaxis:  Hep SQ    Disposition:  Home w/Family             Attending Physician: Feli Valles MD

## 2019-06-03 NOTE — PROGRESS NOTES
Problem: Mobility Impaired (Adult and Pediatric)  Goal: *Acute Goals and Plan of Care (Insert Text)  Description  Physical Therapy Goals  Initiated 6/3/2019 and to be accomplished within 7 day(s)  1. Patient will transfer from bed to chair and chair to bed with modified independence using the least restrictive device. 2.  Patient will perform sit to stand with modified independence. 3.  Patient will ambulate with modified independence for 100 feet with the least restrictive device. 4.  Patient will ascend/descend 4 stairs with handrail(s) with modified independence. Outcome: Progressing Towards Goal    PHYSICAL THERAPY EVALUATION    Patient: Thuy Bragg (74 y.o. male)  Date: 6/3/2019  Primary Diagnosis: Sepsis ([facility] [A41.9]  Procedure(s) (LRB):  RIGHT 5TH METATARSAL AND PHALANX RESECTION (Right) 1 Day Post-Op   Precautions: Fall, NWB(RLE)  NWB RLE  PLOF: Independent    ASSESSMENT :  Mod I for supine to sit/sit to supine. Educated on NWB RLE; verbalized understanding. Compliant with NWB RLE 75% of mobility. Supervision for sit to stand to ww. Amb 25ft with supervision and ww; NWB RLE. Seated EOB. Educated on stair negotiation for entry to home; verbalizes no concerns. Educated on AD of walker versus crutches; verbalizes preference of walker. Declines trial of crutches. Supervision for amb 10ft x2 with ww to/from bathroom. Dynamic standing 5 minutes with supervision. Returned to supine in bed at end of session. Education provided on bed mobility, transfers, ADLs, balance, amb, safety, exercise, role of PT, plan of care, cognition, skin integrity, vitals as indicated. Educated on need for RN assistance with mobility; verbalized understanding. Call bell in reach. Patient will benefit from skilled intervention to address the above impairments. Patient's rehabilitation potential is considered to be Good  Factors which may influence rehabilitation potential include:   ? None noted  ? Mental ability/status  ? Medical condition  ? Home/family situation and support systems  ? Safety awareness  ? Pain tolerance/management  ? Other:      PLAN :  Recommendations and Planned Interventions:   ?           Bed Mobility Training             ? Neuromuscular Re-Education  ? Transfer Training                   ? Orthotic/Prosthetic Training  ? Gait Training                          ? Modalities  ? Therapeutic Exercises           ? Edema Management/Control  ? Therapeutic Activities            ? Family Training/Education  ? Patient Education  ? Other (comment):    Frequency/Duration: Patient will be followed by physical therapy 1-2 time a week to address goals. Discharge Recommendations: Home Health  Further Equipment Recommendations for Discharge: crutches and rolling walker     SUBJECTIVE:   Patient stated ? I'm good. ?    OBJECTIVE DATA SUMMARY:     Past Medical History:   Diagnosis Date    Diabetes ([facility]      Past Surgical History:   Procedure Laterality Date    HX GI      NEUROLOGICAL PROCEDURE UNLISTED       Barriers to Learning/Limitations: yes;  cognitive  Compensate with: Visual Cues, Verbal Cues, Tactile Cues and Kinesthetic Cues    Home Situation:  Home Situation  Home Environment: Private residence  # Steps to Enter: 4  Rails to Enter: Yes  Hand Rails : Bilateral  One/Two Story Residence: One story  Living Alone: Yes  Support Systems: Family member(s)  Patient Expects to be Discharged to[de-identified] Private residence  Current DME Used/Available at Home: None  Tub or Shower Type: Tub/Shower combination    Critical Behavior:  Neurologic State: Alert  Orientation Level: Oriented X4     Safety/Judgement: Fall prevention  Psychosocial  Patient Behaviors: Calm; Cooperative    Strength:    Manual Muscle Testing (LE)         R     L    Hip Flexion:   Not tested 5/5  Knee EXT:     5/5  Knee FLEX:     5/5  Ankle DF:     5/5  _________________________________________________   Tone & Sensation:   Tone: BLE normal  Sensation: Not tested  Range Of Motion:  BLE AROM WFL  Functional Mobility:  Bed Mobility:  Rolling: Modified independent  Supine to Sit: Modified independent  Sit to Supine: Modified independent  Scooting: Modified independent  Transfers:  Sit to Stand: Supervision  Stand to Sit: Supervision  Balance:   Sitting: Intact  Sitting - Static: Good (unsupported)  Sitting - Dynamic: Good (unsupported)  Standing: Impaired  Standing - Static: Good  Standing - Dynamic : Good  Ambulation/Gait Training:  Distance (ft): (25, 10x2)  Assistive Device: Walker, rolling  Ambulation - Level of Assistance: Supervision    Neuro Re-education:  Dynamic standing 5 minutes  Therapeutic Exercises:   Sit to stand x3  Pain:  Pain level pre-treatment: 0/10   Pain level post-treatment: 0/10   Pain Scale 1: Numeric (0 - 10)  Pain Intensity 1: 0      Activity Tolerance:   Good    After treatment:   ?         Patient left in no apparent distress sitting up in chair  ? Patient left in no apparent distress in bed  ? Call bell left within reach  ? Nursing notified  ? Caregiver present  ? Bed alarm activated  ? SCDs applied    COMMUNICATION/EDUCATION:   ?         Role of physical therapy and plan of care in the acute care setting. ?         Fall prevention education was provided and the patient/caregiver indicated understanding. ? Patient/family have participated as able in goal setting and plan of care. ?         Patient/family agree to work toward stated goals and plan of care. ?         Patient understands intent and goals of therapy, but is neutral about his/her participation. ? Patient is unable to participate in goal setting/plan of care: ongoing with therapy staff.     Thank you for this referral.  Hawa Domingo, PT   Time Calculation: 18 mins    Jihan Complexity: History: MEDIUM  Complexity : 1-2 comorbidities / personal factors will impact the outcome/ POC Exam:MEDIUM Complexity : 3 Standardized tests and measures addressing body structure, function, activity limitation and / or participation in recreation  Presentation: MEDIUM Complexity : Evolving with changing characteristics  Clinical Decision Making:Medium Complexity clinical judgement; ROM, MMT, functional mobility  Overall Complexity:MEDIUM

## 2019-06-03 NOTE — PROGRESS NOTES
Problem: Self Care Deficits Care Plan (Adult)  Goal: *Acute Goals and Plan of Care (Insert Text)  Description  Occupational Therapy Goals  Initiated 6/3/2019 within 7 day(s). 1.  Patient will perform functional task in standing for 8 minutes w/ Mod I & 2 rest breaks while abiding by (R)LE NWB 75% of the time to promote dynamic standing tolerance. 2.  Patient will perform toilet transfers with Mod I using LRAD to promote functional independence. 3.  Patient will perform all aspects of toileting with Mod I to promote functional independence. 4.  Patient will utilize energy conservation techniques during functional activities with minimal cues. Outcome: Progressing Towards Goal   OCCUPATIONAL THERAPY EVALUATION    Patient: Sergio Sen (80 y.o. male)  Date: 6/3/2019  Primary Diagnosis: Sepsis ([facility] [A41.9]  Procedure(s) (LRB):  RIGHT 5TH METATARSAL AND PHALANX RESECTION (Right) 1 Day Post-Op   Precautions: NWB (R)LE     PLOF: Pt reports being independent with ADLs & IADLs. ASSESSMENT :  Based on the objective data described below, the patient presents with decreased ADL & mobility participation secondary to right 5th metatarsal & phalanx resection & NWB status. Upon entering room pt supine in bed; agreeable to OT eval. Pt performed bed mobility w/ Mod I, sit to stand from EOB w/ CGA & Vcs for safe hand placement for transitions. Pt performed functional mobility to/from bathroom w/ use of RW & CGA while maintaining NWB on (R)LE 50% of the time. Dry toilet transfer performed w/ CGA & Vcs for safe hand placement during transitions & safe RW positioning during transfer & mobility. Provided & reviewed home safety sheet, discussed pet management to reduce risk of falls & energy conservation techniques. Educated pt on proper positioning of (R)LE to ensure NWB during transfers & transitions. Pt left supine in bed & needs within reach. Pt reports 0/10 pain pre/post session.      Patient will benefit from skilled intervention to address the above impairments. Patient's rehabilitation potential is considered to be Good  Factors which may influence rehabilitation potential include:   ? None noted  ? Mental ability/status  ? Medical condition  ? Home/family situation and support systems  ? Safety awareness  ? Pain tolerance/management  ? Other:      PLAN :  Recommendations and Planned Interventions:   ?               Self Care Training                  ? Therapeutic Activities  ? Functional Mobility Training   ? Cognitive Retraining  ? Therapeutic Exercises           ? Endurance Activities  ? Balance Training                    ? Neuromuscular Re-Education  ? Visual/Perceptual Training     ? Home Safety Training  ? Patient Education                   ? Family Training/Education  ? Other (comment):    Frequency/Duration: Patient will be followed by occupational therapy 1-2 more visits to address goals. Discharge Recommendations: Home Health& 24 hour supervision  Further Equipment Recommendations for Discharge: bedside commode and N/A     SUBJECTIVE:   Patient stated ? I have a cat named Wyoos. ?    OBJECTIVE DATA SUMMARY:     Past Medical History:   Diagnosis Date    Diabetes ([facility] [facility]      Past Surgical History:   Procedure Laterality Date    HX GI      NEUROLOGICAL PROCEDURE UNLISTED       Barriers to Learning/Limitations: None  Compensate with: visual, verbal, tactile, kinesthetic cues/model    Home Situation:   Home Situation  Home Environment: Private residence  # Steps to Enter: 4  Rails to Enter: Yes  Hand Rails : Bilateral  One/Two Story Residence: One story  Living Alone: Yes  Support Systems: Family member(s)  Patient Expects to be Discharged to[de-identified] Private residence  Current DME Used/Available at Home: None  Tub or Shower Type: Tub/Shower combination  ? Right hand dominant   ? Left hand dominant    Cognitive/Behavioral Status:  Neurologic State: Alert  Orientation Level: Oriented X4     Safety/Judgement: Fall prevention    Skin: Intact. Ace bandage noted on (R)LE intact pre & post session. Edema: None noted. Vision/Perceptual:       Wear glasses     Coordination: BUE  Coordination: Generally decreased, functional((R)UE WFL, (L)UE impaired)  Fine Motor Skills-Upper: Left Impaired  from hx of cervical injury    Balance:  Sitting: Impaired  Sitting - Static: Good (unsupported)  Sitting - Dynamic: Good (unsupported)  Standing: Impaired  Standing - Static: Fair  Standing - Dynamic : Fair    Strength: BUE  Strength: Within functional limits((R)UE 4+/5, (L)UE 4/5)    Range of Motion: BUE  AROM: Within functional limits     Functional Mobility and Transfers for ADLs:  Bed Mobility:  Rolling: Modified independent  Supine to Sit: Modified independent  Sit to Supine: Modified independent  Scooting: Modified independent  Transfers:  Sit to Stand: Contact guard assistance  Stand to Sit: Contact guard assistance   Toilet Transfer : Contact guard assistance    Bathroom Mobility: Contact guard assistance    ADL Assessment:   Feeding: Modified independent  Oral Facial Hygiene/Grooming: Setup  Bathing: Setup  Upper Body Dressing: Setup  Lower Body Dressing: Setup  Toileting: Contact guard assistance     ADL Intervention:   Dry toilet transfer performed w/ CGA & Vcs for safe hand placement during transitions & RW positioning. Cognitive Retraining  Safety/Judgement: Fall prevention    Therapeutic Activity:  Pt performed bed mobility w/ Mod I, sit to stand from EOB w/ CGA & Vcs for safe hand placement for transitions. Pt performed functional mobility to/from bathroom w/ use of RW & CGA while maintaining NWB on (R)LE 50% of the time.      Pain:  Pain level pre-treatment: 0/10   Pain level post-treatment: 0/10     Activity Tolerance: Fair  Please refer to the flowsheet for vital signs taken during this treatment. After treatment:   ? Patient left in no apparent distress sitting up in chair  ? Patient left in no apparent distress in bed  ? Call bell left within reach  ? Nursing notified  ? Caregiver present  ? Bed alarm activated    COMMUNICATION/EDUCATION: Provided & reviewed home safety sheet, discussed pet management to reduce risk of falls & energy conservation techniques. Educated pt on proper positioning of (R)LE to ensure NWB during transfers & transitions. ? Role of Occupational Therapy in the acute care setting  ? Home safety education was provided and the patient/caregiver indicated understanding. ? Patient/family have participated as able in goal setting and plan of care. ? Patient/family agree to work toward stated goals and plan of care. ? Patient understands intent and goals of therapy, but is neutral about his/her participation. ? Patient is unable to participate in goal setting and plan of care. Thank you for this referral.  Keven nKox  Time Calculation: 17 mins    Eval Complexity: History: LOW Complexity : Brief history review ; Examination: LOW Complexity : 1-3 performance deficits relating to physical, cognitive , or psychosocial skils that result in activity limitations and / or participation restrictions ;    Decision Making:LOW Complexity : No comorbidities that affect functional and no verbal or physical assistance needed to complete eval tasks

## 2019-06-03 NOTE — DIABETES MGMT
Diabetes Patient/Family Education Record  Factors That  May Influence Patients Ability  to Learn or  Comply with Recommendations   []   Language barrier    []   Cultural needs   []   Motivation    []   Cognitive limitation    []   Physical   [x]   Education    []   Physiological factors   []   Hearing/vision/speaking impairment   []   Restoration beliefs    []   Financial factors   []  Other:   []  No factors identified at this time. Person Instructed:   [x]   Patient   []   Family   []  Other     Preference for Learning:   [x]   Verbal   [x]   Written   []  Demonstration     Level of Comprehension & Competence:    [x]  Good                                      [] Fair                                     []  Poor                             []  Needs Reinforcement   [x]  Teachback completed    Education Component:   [x]  Medication management, including how to administer insulin (if appropriate) and potential medication interactions States he is taking basaglar 28 units every morning and novolog 10 units with meals.    []  Nutritional management -obtain usual meal pattern   []  Exercise   [x]  Signs, symptoms, and treatment of hyperglycemia and hypoglycemia   [x] Prevention, recognition and treatment of hyperglycemia and hypoglycemia   [x]  Importance of blood glucose monitoring and how to obtain a blood glucose meter    []  Instruction on use of the blood glucose meter   [x]  Discuss the importance of HbA1C monitoring    [x]  Sick day guidelines   [x]  Proper use and disposal of lancets, needles, syringes or insulin pens (if appropriate)   [x]  Potential long-term complications (retinopathy, kidney disease, neuropathy, foot care)   [x] Information about whom to contact in case of emergency or for more information    [x]  Goal:  Patient/family will demonstrate understanding of Diabetes Self Management Skills by: (date) __6/8/19____  Plan for post-discharge education or self-management support:    [x] Outpatient class schedule provided            [] Patient Declined    [] Scheduled for outpatient classes (date) _______  Verify:  Does patient understand how diabetes medications work? Yes. States he has had diabetes for 20 years. ______________________  Does patient know what their most recent A1c is? 8.5%____________________________  Does patient monitor glucose at home? Yes. 1-2 times daily____________________________________  Does patient have difficulty obtaining diabetes medications or testing supplies? No issues voiced.

## 2019-06-03 NOTE — PROGRESS NOTES
Infectious Disease Follow-up Note      Date of Admission: 5/29/2019     Date of Note:  6/3/2019    Summary:     40 y/o AAM w/ DM, HTN chronic R lateral plantar ulcer admitted w/new dorsolateral R foot ulcer and OM 5th MT on xray. Transient hypotension promptly responsive to fluids - not septic. Interval History:     Remains afebrile. Tolerating antibiotics. No new complaints.      Current Antimicrobials: Prior Antimicrobials    Vancom, zosyn 5/29 - 2        Assessment / Plan:      Osteomyelitis, right 5th metatarsal  - acute.  Secondary to new dorsolateral ulcer  - xray 5/29: lytic changes distal 5th metatarsal  - MRI 5/31: OM distal 5th MT, base 5th prox phalanx, septic arthritis 5th MTP  - s/p Resxn R 5th MTH, base prox phalanx. 2nd pass bx 5th MT, flap closure 6/2 -> continue current abx for  -> monitor cultures, path  -> if no residual OM, dc abx if > 3 days post op   Positive blood culture GC Strep  - 1 of 2 blcx 5/29 Group C Streptococcus  - unclear significance (contaminant vs from OM wound infection) -> monitor   Transient Hypotension  - promptly resolved with IVF. Gwinda Miu dehydration.  Not septic - appearing.  Now hypertensive -> monitor   Chronic non-healing ulcer, lateral plantar, R foot     DM     HTN        Microbiology:   5/29      blcx IP x2     Lines / Catheters:   piv          Patient Active Problem List   Diagnosis Code    Right foot ulcer, with necrosis of muscle ([address] L97.513    Diabetic polyneuropathy ([address] E11.42    Right foot ulcer, with fat layer exposed ([address] L97.512    Osteomyelitis of right foot ([address] M86.9    Sepsis ([address] A41.9    Left arm weakness R29.898       Current Facility-Administered Medications   Medication Dose Route Frequency    insulin glargine (LANTUS) injection 7 Units  7 Units SubCUTAneous BID    insulin lispro (HUMALOG) injection 2 Units  2 Units SubCUTAneous TIDAC    acetaminophen (TYLENOL) tablet 650 mg  650 mg Oral Q6H PRN    [START ON 6/4/2019] VANCOMYCIN INFORMATION NOTE   Other ONCE    melatonin tablet 3 mg  3 mg Oral QHS PRN    dextrose 10 % infusion 125-250 mL  125-250 mL IntraVENous PRN    vancomycin (VANCOCIN) 1,250 mg in 0.9% sodium chloride 250 mL IVPB  1,250 mg IntraVENous Q12H    sodium chloride (NS) flush 5-10 mL  5-10 mL IntraVENous PRN    aspirin chewable tablet 81 mg  81 mg Oral DAILY    calcium-vitamin D 600 mg(1,500mg) -200 unit per tablet 1 Tab  1 Tab Oral DAILY    gabapentin (NEURONTIN) capsule 300 mg  300 mg Oral TID    simvastatin (ZOCOR) tablet 40 mg  40 mg Oral QHS    0.45% sodium chloride infusion  75 mL/hr IntraVENous CONTINUOUS    heparin (porcine) injection 5,000 Units  5,000 Units SubCUTAneous Q8H    insulin lispro (HUMALOG) injection   SubCUTAneous AC&HS    glucose chewable tablet 16 g  4 Tab Oral PRN    glucagon (GLUCAGEN) injection 1 mg  1 mg IntraMUSCular PRN    VANCOMYCIN INFORMATION NOTE   Other Rx Dosing/Monitoring    piperacillin-tazobactam (ZOSYN) 3.375 g in 0.9% sodium chloride (MBP/ADV) 100 mL \"Extended 4 Hours Infusion ####\"   3.375 g IntraVENous Q8H       Objective:     Visit Vitals  BP 92/60 (BP 1 Location: Left arm, BP Patient Position: At rest)   Pulse 94   Temp 98.3 °F (36.8 °C)   Resp 16   Ht 5' 6\" (1.676 m)   Wt 68 kg (150 lb)   SpO2 96%   BMI 24.21 kg/m²       Temp (24hrs), Av.2 °F (36.8 °C), Min:98 °F (36.7 °C), Max:98.3 °F (36.8 °C)      General: Well developed, well nourished 39 y.o.  male in no acute distress. ENT: ENT exam normal, no neck nodes or sinus tenderness  Head: normocephalic, without obvious abnormality  Mouth:  mucous membranes moist, pharynx normal without lesions  Neck: supple, symmetrical, trachea midline   Cardio:  regular rate and rhythm, S1, S2 normal, no murmur, click, rub or gallop  Lungs: clear to auscultation, no wheezes or rales and unlabored breathing  Abdomen: soft, non-tender. Bowel sounds normal. No masses, no organomegaly.   Extremities:  no redness or tenderness in the calves or thighs, Left foot dressing intact  Neuro: Grossly normal    Lab results     Chemistry  Recent Labs     06/03/19 0438 06/02/19  0430 06/01/19  0420   * 188* 224*    137 137   K 4.0 4.2 4.5    100 101   CO2 30 30 31   BUN 16 17 10   CREA 1.17 0.95 0.93   CA 8.2* 8.8 8.6   AGAP 7 7 5   BUCR 14 18 11*       CBC w/ Diff  Recent Labs     06/03/19 0438 06/02/19 0430 06/01/19  0420   WBC 4.9 3.6* 3.4*   RBC 3.31* 3.48* 3.42*   HGB 9.5* 10.0* 10.0*   HCT 31.0* 32.6* 31.9*    320 329   GRANS 55 45 37*   LYMPH 34 42 48   EOS 3 3 3       Microbiology  All Micro Results     Procedure Component Value Units Date/Time    CULTURE, ANAEROBIC [[id number]] Collected:  06/02/19 1206    Order Status:  Completed Specimen:  Foot Updated:  06/03/19 1324     Special Requests: BONE AND TISSUE RIGHT FOOT 5TH METATARSAL HEAD     Culture result:       CULTURE IN PROGRESS,FURTHER UPDATES TO FOLLOW          CULTURE, TISSUE W Dale Chadwick [[id number]] Collected:  06/02/19 1206    Order Status:  Completed Specimen:  Foot Updated:  06/03/19 1324     Special Requests: BONE AND TISSUE RIGHT FOOT 5TH METATARSAL HEAD     GRAM STAIN RARE WBC'S         NO ORGANISMS SEEN        Culture result: NO GROWTH AFTER 16 HOURS       CULTURE, BLOOD [[id number]] Collected:  05/29/19 1225    Order Status:  Completed Specimen:  Blood Updated:  06/03/19 0853     Special Requests: PERIPHERAL        Culture result: NO GROWTH 5 DAYS       CULTURE, ANAEROBIC [[id number]]     Order Status:  Sent Specimen:  Foot, Right     CULTURE, ANAEROBIC [[id number]]     Order Status:  Sent Specimen:  Foot, Right     CULTURE, BLOOD [[id number]]  (Abnormal) Collected:  05/29/19 1215    Order Status:  Completed Specimen:  Blood Updated:  05/31/19 0732     Special Requests: PERIPHERAL        GRAM STAIN       ANAEROBIC BOTTLE GRAM POSITIVE COCCI IN CHAINS IN PAIRS                  SMEAR CALLED TO AND CORRECTLY REPEATED BY: Peter Andrews RN,3000, ON 5/30/19 AT 0329 TO [facility]           Culture result:       ANAEROBIC BOTTLE STREPTOCOCCI, BETA HEMOLYTIC GROUP C                 Porsche Mai MD, Gavin Santos  Infectious Disease Specialist  Pager [phone]

## 2019-06-03 NOTE — PROGRESS NOTES
Problem: Discharge Planning  Goal: *Discharge to safe environment  Outcome: Progressing Towards Goal    Plan: home    Chart reviewed. Plan remains home when medically stable. Will cont to assess for home health needs. Available as needed. Lara Conley RN,ext [phone].

## 2019-06-03 NOTE — ROUTINE PROCESS
Bedside and Verbal shift change report given to [facility] RN (oncoming nurse) by Mine Castillo RN (offgoing nurse). Report included the following information SBAR, Intake/Output, MAR and Recent Results. Pt rested quietly throughout shift. Melatonin 3mg PRN for sleep. Bedside and Verbal shift change report given to Carl Johnson. 291 (oncoming nurse) by Jessica Johnson (offgoing nurse). Report included the following information SBAR, Intake/Output, MAR and Recent Results.

## 2019-06-03 NOTE — PROGRESS NOTES
Podiatry Surgery Progress Note      Patient: Michelle Pena MRN: [id number]  SSN: [id number]    YOB: 1975  Age: 39 y.o. Sex: male      Assessment:     Patient Active Problem List   Diagnosis Code    Right foot ulcer, with necrosis of muscle ([address] L97.513    Diabetic polyneuropathy ([address] E11.42    Right foot ulcer, with fat layer exposed ([address] L97.512    Osteomyelitis of right foot ([address] M86.9    Sepsis ([address] A41.9    Left arm weakness R29.898          Plan: Will change dressing tomorrow right foot. Continue current treatment course    Total time spent with patient: 30 [address] discussed with: Patient    Discussed:  Care Plan    Disposition:  Stable      Mr. Hernan Loya is a 39 y.o. male who was seen at bedside with no new complaints today. Subjective:   Past Medical History  Past Medical History:   Diagnosis Date    Diabetes ([address]      Social History     Socioeconomic History    Marital status: SINGLE     Spouse name: Not on file    Number of children: Not on file    Years of education: Not on file    Highest education level: Not on file   Occupational History    Not on file   Social Needs    Financial resource strain: Not on file    Food insecurity:     Worry: Not on file     Inability: Not on file    Transportation needs:     Medical: Not on file     Non-medical: Not on file   Tobacco Use    Smoking status: Current Every Day Smoker     Packs/day: 0.25    Smokeless tobacco: Never Used   Substance and Sexual Activity    Alcohol use:  Yes     Alcohol/week: 3.0 oz     Types: 1 Shots of liquor, 4 Glasses of wine per week     Comment: socially    Drug use: Yes     Frequency: 7.0 times per week     Types: Marijuana    Sexual activity: Not on file   Lifestyle    Physical activity:     Days per week: Not on file     Minutes per session: Not on file    Stress: Not on file   Relationships    Social connections:     Talks on phone: Not on file     Gets together: Not on file     Attends [employer] service: Not on file     Active member of club or organization: Not on file     Attends meetings of clubs or organizations: Not on file     Relationship status: Not on file    Intimate partner violence:     Fear of current or ex partner: Not on file     Emotionally abused: Not on file     Physically abused: Not on file     Forced sexual activity: Not on file   Other Topics Concern     Service Not Asked    Blood Transfusions Not Asked    Caffeine Concern Not Asked    Occupational Exposure Not Asked   Susana Priestly Hazards Not Asked    Sleep Concern Not Asked    Stress Concern Not Asked    Weight Concern Not Asked    Special Diet Not Asked    Back Care Not Asked    Exercise Not Asked    Bike Helmet Not Asked   [address] [facility] [facility] Not Asked    Self-Exams Not Asked   Social History Narrative    Not on file       Current Medications  Current Facility-Administered Medications   Medication Dose Route Frequency Provider Last Rate Last Dose    [START ON 6/4/2019] VANCOMYCIN INFORMATION NOTE   Other ONCE Elle Cruz MD        melatonin tablet 3 mg  3 mg Oral QHS PRN Sherry Dolan MD   3 mg at 06/03/19 0051    acetaminophen (TYLENOL) tablet 650 mg  650 mg Oral Q6H PRN Sherry Dolan MD        insulin glargine (LANTUS) injection 6 Units  6 Units SubCUTAneous BID Marleny Weston DPM   6 Units at 06/02/19 2230    dextrose 10 % infusion 125-250 mL  125-250 mL IntraVENous PRN YAZAN ShoemakerM        vancomycin (VANCOCIN) 1,250 mg in 0.9% sodium chloride 250 mL IVPB  1,250 mg IntraVENous Q12H Marleny Weston  mL/hr at 06/03/19 0500 1,250 mg at 06/03/19 0500    sodium chloride (NS) flush 5-10 mL  5-10 mL IntraVENous PRN Marleny Weston, DPM        aspirin chewable tablet 81 mg  81 mg Oral DAILY Marleny Weston DPM   Stopped at 06/02/19 0900    calcium-vitamin D 600 mg(1,500mg) -200 unit per tablet 1 Tab  1 Tab Oral DAILY Marleny Trista YAZANM   Stopped at 06/02/19 0900    gabapentin (NEURONTIN) capsule 300 mg  300 mg Oral TID Maritza Mendoza DPM   300 mg at 06/02/19 2224    simvastatin (ZOCOR) tablet 40 mg  40 mg Oral QHS Maritza Mendzoa DPM   40 mg at 06/02/19 2224    0.45% sodium chloride infusion  75 mL/hr IntraVENous CONTINUOUS Maritza Mendoza DPM 75 mL/hr at 06/02/19 2230 75 mL/hr at 06/02/19 2230    heparin (porcine) injection 5,000 Units  5,000 Units SubCUTAneous Q8H Maritza Mendoza DPM   5,000 Units at 06/03/19 3452    insulin lispro (HUMALOG) injection   SubCUTAneous AC&HS Maritza Mendoza DPM   12 Units at 06/02/19 2229    glucose chewable tablet 16 g  4 Tab Oral PRN Maritza Mendoza DPM   16 g at 05/30/19 4902    glucagon (GLUCAGEN) injection 1 mg  1 mg IntraMUSCular PRN Maritza Mendoza DPM        VANCOMYCIN INFORMATION NOTE   Other Rx Dosing/Monitoring Maritza Mendoza DPM        piperacillin-tazobactam (ZOSYN) 3.375 g in 0.9% sodium chloride (MBP/ADV) 100 mL \"Extended 4 Hours Infusion ####\"   3.375 g IntraVENous Q8H Maritza Mendoza DPM 25 mL/hr at 06/03/19 6137 3.375 g at 06/03/19 9676       Patient Allergies  Allergies   Allergen Reactions    Contrast Agent [Iodine] Rash          Objective:   General Exam  alert, cooperative, no distress, appears stated age    Vitals  Visit Vitals  /71   Pulse 83   Temp 98.3 °F (36.8 °C)   Resp 18   Ht 5' 6\" (1.676 m)   Wt 68 kg (150 lb)   SpO2 94%   BMI 24.21 kg/m²       REVIEW OF SYSTEMS:  Constitutional: Negative for chills and fever. He denies chronic fatigue, weight loss,depression, nervousness, panic attacks.    HEENT: Negative for congestion, rhinorrhea and sore throat.  He denies ringing in ears,  dizzy spells, glaucoma, sinus trouble,  Respiratory: Denies for cough and shortness of breath, hemoptysis    Cardiovascular: Denies chest pain, irregular heart beat.   Gastrointestinal: Negative for abdominal pain, blood in stool, constipation, diarrhea, nausea and vomiting. Genitourinary: Negative for dysuria, frequency and hematuria. Musculoskeletal: Negative for back pain and myalgias. Skin: Positive for RIGHT foot with necrotic tissue distal lateral 5th metatarsal head both plantar ulcer and lateral  Negative for rash. Neurological: Negative for dizziness and headaches         Physical Exam:     Esther Morales  is a 39 y.o. male who is pleasant, alert and oriented x3, in no apparent distress, and looks their given age. Patient is well-developed and nourished, with good attention to hygiene and body habitus. Mood and affect normal, appropriate to situation.       Lower Extremity Exam: .     Left: 5th metatarsal  DSD intact with no strike through drainage noted right foot. TLS in place and pulling. Just changed with noted drainage.     VASCULAR EXAM:   Pedal pulses: intact 2/4 D/P and P/T.  Skin temperature is warm to warm right and left foot. Digital capillary fill time is 3sec right and left foot.      Neurological Exam:   Light touch protective sensation is absent to both feet. There is noted Loss of protective sensation. There are no Tinel's or 's signs present to the nerves crossing the ankle joint.     MUSCULOSKELETAL EXAM:.   Muscle tone is normal for age and situation. Muscle strength of the flexor and extensor group inversion and eversion Bilateral 5/5.      MYCOTIC NAIL:.   Dystrophic nail 1,2,3,4,5 bilateral. Elongated thickened nails 1,2,3,4,5 bilateral Hypertrophic nails 1,2,3,4,5     DERMATOLOGICAL EXAM:.   Skin is of abnormal texture and turgor with some atrophic skin changes noting decreased hair growth, nail changes (thickening), pigmentary changes, skin texture (thin,shiney), skin color (rubor, red) . R/L Bilateral. There is diffuse xerosis.  There is noted subungual debris         Wound Foot Right;Plantar;Medial (Active)   Dressing Status  Clean, dry, and intact 5/31/2019  7:30 PM   Number of days: 228       Wound Foot Right;Plantar;Lateral (Active)   Dressing Status  Clean, dry, and intact 5/31/2019  7:30 PM   Number of days: 228       Wound Foot Right (Active)   Dressing Status Clean, dry, and intact 6/2/2019  7:30 PM   Dressing Type Elastic bandage 6/2/2019  7:30 PM   Incision Site Well Approximated Yes 6/2/2019 11:00 AM   Number of days: 1        Labs  Recent Results (from the past 24 hour(s))   GLUCOSE, POC    Collection Time: 06/02/19 11:32 AM   Result Value Ref Range    Glucose (POC) 134 (H) 70 - 110 mg/dL   CULTURE, TISSUE W GRAM STAIN    Collection Time: 06/02/19 12:06 PM   Result Value Ref Range    Special Requests: BONE AND TISSUE RIGHT FOOT 5TH METATARSAL HEAD     GRAM STAIN RARE WBC'S      GRAM STAIN NO ORGANISMS SEEN      Culture result: PENDING    VANCOMYCIN, TROUGH    Collection Time: 06/02/19  1:35 PM   Result Value Ref Range    Vancomycin,trough 17.0 10.0 - 20.0 ug/mL    Reported dose date:        Please note new reference range based on introduction of [facility] standardized reagent. Reported dose time:        Please note new reference range based on introduction of [facility] standardized reagent. Reported dose:  UNITS     Please note new reference range based on introduction of [facility] standardized reagent.    GLUCOSE, POC    Collection Time: 06/02/19  4:16 PM   Result Value Ref Range    Glucose (POC) 226 (H) 70 - 110 mg/dL   GLUCOSE, POC    Collection Time: 06/02/19  9:17 PM   Result Value Ref Range    Glucose (POC) 300 (H) 70 - 879 mg/dL   METABOLIC PANEL, BASIC    Collection Time: 06/03/19  4:38 AM   Result Value Ref Range    Sodium 139 136 - 145 mmol/L    Potassium 4.0 3.5 - 5.5 mmol/L    Chloride 102 100 - 108 mmol/L    CO2 30 21 - 32 mmol/L    Anion gap 7 3.0 - 18 mmol/L    Glucose 167 (H) 74 - 99 mg/dL    BUN 16 7.0 - 18 MG/DL    Creatinine 1.17 0.6 - 1.3 MG/DL    BUN/Creatinine ratio 14 12 - 20      GFR est AA >60 >60 ml/min/1.73m2    GFR est non-AA >60 >60 ml/min/1.73m2    Calcium 8.2 (L) 8.5 - 10.1 MG/DL   CBC WITH AUTOMATED DIFF Collection Time: 06/03/19  4:38 AM   Result Value Ref Range    WBC 4.9 4.6 - 13.2 K/uL    RBC 3.31 (L) 4.70 - 5.50 M/uL    HGB 9.5 (L) 13.0 - 16.0 g/dL    HCT 31.0 (L) 36.0 - 48.0 %    MCV 93.7 74.0 - 97.0 FL    MCH 28.7 24.0 - 34.0 PG    MCHC 30.6 (L) 31.0 - 37.0 g/dL    RDW 12.8 11.6 - 14.5 %    PLATELET 969 738 - 203 K/uL    MPV 10.4 9.2 - 11.8 FL    NEUTROPHILS 55 40 - 73 %    LYMPHOCYTES 34 21 - 52 %    MONOCYTES 8 3 - 10 %    EOSINOPHILS 3 0 - 5 %    BASOPHILS 0 0 - 2 %    ABS. NEUTROPHILS 2.6 1.8 - 8.0 K/UL    ABS. LYMPHOCYTES 1.7 0.9 - 3.6 K/UL    ABS. MONOCYTES 0.4 0.05 - 1.2 K/UL    ABS. EOSINOPHILS 0.2 0.0 - 0.4 K/UL    ABS. BASOPHILS 0.0 0.0 - 0.1 K/UL    DF AUTOMATED     MAGNESIUM    Collection Time: 06/03/19  4:38 AM   Result Value Ref Range    Magnesium 2.3 1.6 - 2.6 mg/dL   PHOSPHORUS    Collection Time: 06/03/19  4:38 AM   Result Value Ref Range    Phosphorus 4.0 2.5 - 4.9 MG/DL   GLUCOSE, POC    Collection Time: 06/03/19  7:00 AM   Result Value Ref Range    Glucose (POC) 189 (H) 70 - 110 mg/dL     RADIOGRAPHIC FINDINGS:.  (see report for details)  lytic changes of the fifth distal metatarsal compatible with  osteomyelitis    Procedures:    1. Right foot fifth metatarsal head resection along with base of the proximal phalanx. 2.  Second pass bone biopsies of the fifth metatarsal and proximal phalanx respectively. 3.  Flap closure.  POD #1                 Ryan Stapleton DPM  Deanne 3, 2019

## 2019-06-04 LAB
BACTERIA SPEC CULT: NORMAL
DATE LAST DOSE: NORMAL
GLUCOSE BLD STRIP.AUTO-MCNC: 183 MG/DL (ref 70–110)
GLUCOSE BLD STRIP.AUTO-MCNC: 229 MG/DL (ref 70–110)
GLUCOSE BLD STRIP.AUTO-MCNC: 270 MG/DL (ref 70–110)
REPORTED DOSE,DOSE: NORMAL UNITS
REPORTED DOSE/TIME,TMG: NORMAL
SERVICE CMNT-IMP: NORMAL
VANCOMYCIN TROUGH SERPL-MCNC: 18.4 UG/ML (ref 10–20)

## 2019-06-04 PROCEDURE — 74011250636 HC RX REV CODE- 250/636: Performed by: INTERNAL MEDICINE

## 2019-06-04 PROCEDURE — 82962 GLUCOSE BLOOD TEST: CPT

## 2019-06-04 PROCEDURE — 36415 COLL VENOUS BLD VENIPUNCTURE: CPT

## 2019-06-04 PROCEDURE — 74011250636 HC RX REV CODE- 250/636: Performed by: PODIATRIST

## 2019-06-04 PROCEDURE — 97116 GAIT TRAINING THERAPY: CPT

## 2019-06-04 PROCEDURE — 74011000258 HC RX REV CODE- 258: Performed by: PODIATRIST

## 2019-06-04 PROCEDURE — 80202 ASSAY OF VANCOMYCIN: CPT

## 2019-06-04 PROCEDURE — 65660000000 HC RM CCU STEPDOWN

## 2019-06-04 PROCEDURE — 97530 THERAPEUTIC ACTIVITIES: CPT

## 2019-06-04 PROCEDURE — 97535 SELF CARE MNGMENT TRAINING: CPT

## 2019-06-04 PROCEDURE — 74011000258 HC RX REV CODE- 258: Performed by: INTERNAL MEDICINE

## 2019-06-04 PROCEDURE — 74011636637 HC RX REV CODE- 636/637: Performed by: HOSPITALIST

## 2019-06-04 PROCEDURE — 74011636637 HC RX REV CODE- 636/637: Performed by: PODIATRIST

## 2019-06-04 PROCEDURE — 74011250637 HC RX REV CODE- 250/637: Performed by: PODIATRIST

## 2019-06-04 PROCEDURE — 36573 INSJ PICC RS&I 5 YR+: CPT | Performed by: HOSPITALIST

## 2019-06-04 RX ORDER — INSULIN LISPRO 100 [IU]/ML
3 INJECTION, SOLUTION INTRAVENOUS; SUBCUTANEOUS
Status: DISCONTINUED | OUTPATIENT
Start: 2019-06-04 | End: 2019-06-05

## 2019-06-04 RX ORDER — INSULIN GLARGINE 100 [IU]/ML
8 INJECTION, SOLUTION SUBCUTANEOUS 2 TIMES DAILY
Status: DISCONTINUED | OUTPATIENT
Start: 2019-06-04 | End: 2019-06-05

## 2019-06-04 RX ADMIN — INSULIN LISPRO 3 UNITS: 100 INJECTION, SOLUTION INTRAVENOUS; SUBCUTANEOUS at 18:07

## 2019-06-04 RX ADMIN — PIPERACILLIN SODIUM,TAZOBACTAM SODIUM 3.38 G: 3; .375 INJECTION, POWDER, FOR SOLUTION INTRAVENOUS at 05:47

## 2019-06-04 RX ADMIN — HEPARIN SODIUM 5000 UNITS: 5000 INJECTION INTRAVENOUS; SUBCUTANEOUS at 05:56

## 2019-06-04 RX ADMIN — INSULIN LISPRO 2 UNITS: 100 INJECTION, SOLUTION INTRAVENOUS; SUBCUTANEOUS at 09:08

## 2019-06-04 RX ADMIN — ASPIRIN 81 MG 81 MG: 81 TABLET ORAL at 10:15

## 2019-06-04 RX ADMIN — GABAPENTIN 300 MG: 100 CAPSULE ORAL at 17:35

## 2019-06-04 RX ADMIN — INSULIN LISPRO 6 UNITS: 100 INJECTION, SOLUTION INTRAVENOUS; SUBCUTANEOUS at 09:07

## 2019-06-04 RX ADMIN — SODIUM CHLORIDE 75 ML/HR: 450 INJECTION, SOLUTION INTRAVENOUS at 19:03

## 2019-06-04 RX ADMIN — HEPARIN SODIUM 5000 UNITS: 5000 INJECTION INTRAVENOUS; SUBCUTANEOUS at 13:31

## 2019-06-04 RX ADMIN — INSULIN LISPRO 3 UNITS: 100 INJECTION, SOLUTION INTRAVENOUS; SUBCUTANEOUS at 12:37

## 2019-06-04 RX ADMIN — Medication 1 TABLET: at 10:15

## 2019-06-04 RX ADMIN — SODIUM CHLORIDE 2 G: 900 INJECTION INTRAVENOUS at 18:57

## 2019-06-04 RX ADMIN — PIPERACILLIN SODIUM,TAZOBACTAM SODIUM 3.38 G: 3; .375 INJECTION, POWDER, FOR SOLUTION INTRAVENOUS at 13:24

## 2019-06-04 RX ADMIN — HEPARIN SODIUM 5000 UNITS: 5000 INJECTION INTRAVENOUS; SUBCUTANEOUS at 22:26

## 2019-06-04 RX ADMIN — SIMVASTATIN 40 MG: 40 TABLET, FILM COATED ORAL at 22:25

## 2019-06-04 RX ADMIN — SODIUM CHLORIDE 75 ML/HR: 450 INJECTION, SOLUTION INTRAVENOUS at 04:21

## 2019-06-04 RX ADMIN — INSULIN GLARGINE 7 UNITS: 100 INJECTION, SOLUTION SUBCUTANEOUS at 09:08

## 2019-06-04 RX ADMIN — INSULIN LISPRO 2 UNITS: 100 INJECTION, SOLUTION INTRAVENOUS; SUBCUTANEOUS at 12:37

## 2019-06-04 RX ADMIN — GABAPENTIN 300 MG: 100 CAPSULE ORAL at 10:15

## 2019-06-04 RX ADMIN — GABAPENTIN 300 MG: 100 CAPSULE ORAL at 22:25

## 2019-06-04 RX ADMIN — INSULIN LISPRO 6 UNITS: 100 INJECTION, SOLUTION INTRAVENOUS; SUBCUTANEOUS at 23:14

## 2019-06-04 RX ADMIN — INSULIN GLARGINE 8 UNITS: 100 INJECTION, SOLUTION SUBCUTANEOUS at 23:14

## 2019-06-04 RX ADMIN — SODIUM CHLORIDE 1250 MG: 900 INJECTION, SOLUTION INTRAVENOUS at 10:19

## 2019-06-04 NOTE — PROGRESS NOTES
If needs iv antibx will need daily dosing for op infusion center, only has [facility] , will not cover antibxs at home. Will need hh , for any wd care. Looks like lives alone, has no support. Spoke with Dr Luna Ramirez, he has not seen yet today but does not think will need iv antibxs. He is waiting for path report.

## 2019-06-04 NOTE — PROGRESS NOTES
Internal Medicine Progress Note        NAME: Ekta Agosto   :  1975  MRM:  [id number]    Date/Time: 2019        ASSESSMENT/PLAN:        #  Sepsis ([facility] (2019) , likely from foot infection. Blood and ulcer CS. ID consulted, cont vanc and zosyn. Tele bed. Maintain vital signs. IVF. Lactic acid. Presented with hypotension and tachycardia  - blood C/S , one bottle positive for GPC, anaerobic positive for  BHS      # Dehydration. IVF. Strict I/O      #   Right foot ulcer, with necrosis of muscle ([facility] (10/18/2018). Podiatrist consulted and performed right foot 5th metatarsal head resection and bone biopsies     #   Osteomyelitis of right foot ([facility] (2019). MRI positive for  OM. Possible the source of the infection. ID and podiatry consulted. As above. Pt had surgery by Dr. Martin Viear on  (right 5th metatarsal head resection)     #   Left arm weakness (2019) from previous injury in his neck. Stable.      # DM . Provide SSI, hypoglycemia protocol and frequent Accu checks.      # Leukopenia. trend     -DVT prophylaxis :  heparin.   - Code Status : FULL      Lab Review:     Recent Labs     19  0438 19  0430   WBC 4.9 3.6*   HGB 9.5* 10.0*   HCT 31.0* 32.6*    320     Recent Labs     19  0438 19  0430    137   K 4.0 4.2    100   CO2 30 30   * 188*   BUN 16 17   CREA 1.17 0.95   CA 8.2* 8.8   MG 2.3  --    PHOS 4.0  --      Lab Results   Component Value Date/Time    Glucose (POC) 183 (H) 2019 11:39 AM    Glucose (POC) 229 (H) 2019 08:05 AM    Glucose (POC) 390 (H) 2019 10:01 PM    Glucose (POC) 250 (H) 2019 04:36 PM    Glucose (POC) 398 (H) 2019 11:52 AM    Glucose, POC 75 2019 12:25 PM     No results for input(s): PH, PCO2, PO2, HCO3, FIO2 in the last 72 hours. No results for input(s): INR in the last 72 hours.     No lab exists for component: INREXT, INREXT    No results found for: SDES  Lab Results Component Value Date/Time    Culture result: NO GROWTH 2 DAYS 06/02/2019 12:06 PM    Culture result: NO ANAEROBES ISOLATED 2 DAYS 06/02/2019 12:06 PM    Culture result: NO GROWTH 6 DAYS 05/29/2019 12:25 PM           Subjective:     Chief Complaint:      No acute issue, dressing changed today by podiatry  Pt working with PT          Objective:     Vitals:  Last 24hrs VS reviewed since prior progress note. Most recent are:    Visit Vitals  /66 (BP 1 Location: Right arm, BP Patient Position: At rest)   Pulse 88   Temp 98.5 °F (36.9 °C)   Resp 16   Ht 5' 6\" (1.676 m)   Wt 65.9 kg (145 lb 4.8 oz)   SpO2 100%   BMI 23.45 kg/m²     SpO2 Readings from Last 6 Encounters:   06/04/19 100%   11/15/18 98%   11/01/18 100%   10/18/18 100%   02/23/13 99%            Intake/Output Summary (Last 24 hours) at 6/4/2019 1145  Last data filed at 6/4/2019 0907  Gross per 24 hour   Intake 60246.83 ml   Output 2977 ml   Net 8943.83 ml          Physical Exam:   Gen:  Appear stated age, Well-developed, well-nourished, in no acute distress  HEENT:  Head atraumatic, normocephalic , hearing intact to voice, moist mucous membranes. Neck:  Supple, no masses I appreciate, thyroid non-tender. Resp:  No accessory muscle use,Bilateral BS present, clear breath sounds without wheezes rales or rhonchi  Card:  No murmurs, normal S1, S2 without thrills, bruits or peripheral edema. Abd:  Soft, non-tender, non-distended, normoactive bowel sounds are present, no palpable organomegaly    Musc:  No cyanosis or clubbing. Skin: right foot wrapped in ACE, skin turgor is good. Neuro:  Cranial nerves are grossly intact,  follows commands appropriately. alert.      Medications Reviewed: (see below)    Lab Data Reviewed: (see below)    ______________________________________________________________________    Medications:     Current Facility-Administered Medications   Medication Dose Route Frequency    insulin glargine (LANTUS) injection 7 Units  7 Units SubCUTAneous BID    insulin lispro (HUMALOG) injection 2 Units  2 Units SubCUTAneous TIDAC    acetaminophen (TYLENOL) tablet 650 mg  650 mg Oral Q6H PRN    VANCOMYCIN INFORMATION NOTE   Other ONCE    melatonin tablet 3 mg  3 mg Oral QHS PRN    dextrose 10 % infusion 125-250 mL  125-250 mL IntraVENous PRN    vancomycin (VANCOCIN) 1,250 mg in 0.9% sodium chloride 250 mL IVPB  1,250 mg IntraVENous Q12H    sodium chloride (NS) flush 5-10 mL  5-10 mL IntraVENous PRN    aspirin chewable tablet 81 mg  81 mg Oral DAILY    calcium-vitamin D 600 mg(1,500mg) -200 unit per tablet 1 Tab  1 Tab Oral DAILY    gabapentin (NEURONTIN) capsule 300 mg  300 mg Oral TID    simvastatin (ZOCOR) tablet 40 mg  40 mg Oral QHS    0.45% sodium chloride infusion  75 mL/hr IntraVENous CONTINUOUS    heparin (porcine) injection 5,000 Units  5,000 Units SubCUTAneous Q8H    insulin lispro (HUMALOG) injection   SubCUTAneous AC&HS    glucose chewable tablet 16 g  4 Tab Oral PRN    glucagon (GLUCAGEN) injection 1 mg  1 mg IntraMUSCular PRN    VANCOMYCIN INFORMATION NOTE   Other Rx Dosing/Monitoring    piperacillin-tazobactam (ZOSYN) 3.375 g in 0.9% sodium chloride (MBP/ADV) 100 mL \"Extended 4 Hours Infusion ####\"   3.375 g IntraVENous Q8H          Total time spent with patient: 25 minutes                  Care Plan discussed with: Patient and Nursing Staff    Discussed:  Care Plan    Prophylaxis:  Hep SQ    Disposition:  Home w/Family             Attending Physician: Lamar Rodgers MD

## 2019-06-04 NOTE — DIABETES MGMT
Glycemic Control:  Recommend increasing Lantus from 7  to 8 units  every 12 hrs and meal time lispro from 2 to 3 units. Pt is prone to hypoglycemia, so recommendations are cautious.  Sammi KLELOGG

## 2019-06-04 NOTE — PROGRESS NOTES
Podiatry Surgery Progress Note      Patient: Nabeel Reveles MRN: [id number]  SSN: [id number]    YOB: 1975  Age: 39 y.o. Sex: male      Assessment:     Patient Active Problem List   Diagnosis Code    Right foot ulcer, with necrosis of muscle ([address] L97.513    Diabetic polyneuropathy ([address] E11.42    Right foot ulcer, with fat layer exposed ([address] L97.512    Osteomyelitis of right foot ([address] M86.9    Sepsis ([address] A41.9    Left arm weakness R29.898          Plan:   Changed dressing today with betadine and DSD. Will pull drain tomorrow. Continue current IV antibx and [address] right foot. He must remain NWB right foot, stressed to patient. Arrange for [facility] care if discharged, for incision care with beradine dressing changes and NWB ambulation right foot    Total time spent with patient: 30 [address] discussed with: Patient    Discussed:  Care Plan    Disposition:  Stable      Mr. Marisabel Javier is a 39 y.o. male who was seen at bedside this AM. He has no new complaints today. Subjective:   Past Medical History  Past Medical History:   Diagnosis Date    Diabetes ([address]      Social History     Socioeconomic History    Marital status: SINGLE     Spouse name: Not on file    Number of children: Not on file    Years of education: Not on file    Highest education level: Not on file   Occupational History    Not on file   Social Needs    Financial resource strain: Not on file    Food insecurity:     Worry: Not on file     Inability: Not on file    Transportation needs:     Medical: Not on file     Non-medical: Not on file   Tobacco Use    Smoking status: Current Every Day Smoker     Packs/day: 0.25    Smokeless tobacco: Never Used   Substance and Sexual Activity    Alcohol use:  Yes     Alcohol/week: 3.0 oz     Types: 1 Shots of liquor, 4 Glasses of wine per week     Comment: socially    Drug use: Yes     Frequency: 7.0 times per week     Types: Marijuana    Sexual activity: Not on file   Lifestyle    Physical activity:     Days per week: Not on file     Minutes per session: Not on file    Stress: Not on file   Relationships    Social connections:     Talks on phone: Not on file     Gets together: Not on file     Attends Gnosticist service: Not on file     Active member of club or organization: Not on file     Attends meetings of clubs or organizations: Not on file     Relationship status: Not on file    Intimate partner violence:     Fear of current or ex partner: Not on file     Emotionally abused: Not on file     Physically abused: Not on file     Forced sexual activity: Not on file   Other Topics Concern     Service Not Asked    Blood Transfusions Not Asked    Caffeine Concern Not Asked    Occupational Exposure Not Asked   Clemetine Budge Hazards Not Asked    Sleep Concern Not Asked    Stress Concern Not Asked    Weight Concern Not Asked    Special Diet Not Asked    Back Care Not Asked    Exercise Not Asked    Bike Helmet Not Asked   [address] [facility] Not Asked    Self-Exams Not Asked   Social History Narrative    Not on file       Current Medications  Current Facility-Administered Medications   Medication Dose Route Frequency Provider Last Rate Last Dose    insulin glargine (LANTUS) injection 7 Units  7 Units SubCUTAneous BID Vitaliy Jolley MD   7 Units at 06/03/19 2157    insulin lispro (HUMALOG) injection 2 Units  2 Units SubCUTAneous Adalberto Davis MD   2 Units at 06/03/19 1827    acetaminophen (TYLENOL) tablet 650 mg  650 mg Oral Q6H PRN Vitaliy Jolley MD        VANCOMYCIN INFORMATION NOTE   Other ONCE Rose Cruz MD        melatonin tablet 3 mg  3 mg Oral QHS PRN Steven Dickey MD   3 mg at 06/03/19 0051    dextrose 10 % infusion 125-250 mL  125-250 mL IntraVENous PRN Maryan Panda DPM        vancomycin (VANCOCIN) 1,250 mg in 0.9% sodium chloride 250 mL IVPB  1,250 mg IntraVENous Q12H Maryan Panda  mL/hr at 06/03/19 1924 1,250 mg at 06/03/19 1924    sodium chloride (NS) flush 5-10 mL  5-10 mL IntraVENous PRN Schenazarioy Edson, DPM        aspirin chewable tablet 81 mg  81 mg Oral DAILY Scherry Edson, DPM   81 mg at 06/03/19 1044    calcium-vitamin D 600 mg(1,500mg) -200 unit per tablet 1 Tab  1 Tab Oral DAILY Schenazarioy Edson, DPM   1 Tab at 06/03/19 1044    gabapentin (NEURONTIN) capsule 300 mg  300 mg Oral TID Scherry Edson, DPM   300 mg at 06/03/19 2158    simvastatin (ZOCOR) tablet 40 mg  40 mg Oral QHS Edouard Edson, DPM   40 mg at 06/03/19 2158    0.45% sodium chloride infusion  75 mL/hr IntraVENous CONTINUOUS Edouard Edson, DPM 75 mL/hr at 06/04/19 0421 75 mL/hr at 06/04/19 0421    heparin (porcine) injection 5,000 Units  5,000 Units SubCUTAneous Q8H Edouard Edson, DPM   5,000 Units at 06/04/19 0556    insulin lispro (HUMALOG) injection   SubCUTAneous AC&HS Edouard Sandhu, DPM   15 Units at 06/03/19 2238    glucose chewable tablet 16 g  4 Tab Oral PRN Bany Edson, DPM   16 g at 05/30/19 0758    glucagon (GLUCAGEN) injection 1 mg  1 mg IntraMUSCular PRN Schemanuel Edson, DPM        VANCOMYCIN INFORMATION NOTE   Other Rx Dosing/Monitoring Edouard Sandhu, DPM        piperacillin-tazobactam (ZOSYN) 3.375 g in 0.9% sodium chloride (MBP/ADV) 100 mL \"Extended 4 Hours Infusion ####\"   3.375 g IntraVENous Q8H Schenazarioy Edson, DPM 25 mL/hr at 06/04/19 0547 3.375 g at 06/04/19 0547       Patient Allergies  Allergies   Allergen Reactions    Contrast Agent [Iodine] Rash          Objective:   General Exam  alert, cooperative, no distress, appears stated age    Vitals  Visit Vitals  BP 94/54 (BP 1 Location: Left arm, BP Patient Position: At rest)   Pulse 84   Temp 98.7 °F (37.1 °C)   Resp 16   Ht 5' 6\" (1.676 m)   Wt 65.9 kg (145 lb 4.8 oz)   SpO2 96%   BMI 23.45 kg/m²       REVIEW OF SYSTEMS:  General: denies chronic fatigue, weight loss, fever, anemia, bruising, depression, nervousness, panic attacks  HEENT: denies ringing in ears, ear infections, dizzy spells, poor vision, glaucoma, sinus trouble, hoarseness, eye infections  GI: denies diarrhea, gas, bloating, heartburn, regurgitation, difficulty swallowing, painful swallowing, nausea, vomiting, constipation, abdominal pain, decreased appetite, blood in stools, black stools, jaundice, dark urine  Lungs: denies pneumonia, asthma, cough, SOB, hemoptysis  Heart: denies chest pain, irregular heart beat, ankle swelling, HTN  Skin: denies rashes, hives, allergic reaction  Urinary: denies UTI, kidney stones, decreased urine force and flow, urination at night, blood in urine, painful urination  Bones and Joints: denies arthritis, rheumatism, back pain, gout, osteoporosis  Neurologic: denies stroke, seizures, headaches, numbness, tingling       Physical Exam:     Juliano Handy  is a 39 y. o. male who is pleasant, alert and oriented x3, in no apparent distress, and looks their given age. Patient is well-developed and nourished, with good attention to hygiene and body habitus. Mood and affect normal, appropriate to situation.       Lower Extremity Exam: .      Left: 5th metatarsal  DSD intact with no strike through drainage noted right foot. TLS in place and pulling about 1cc drainage noted in the tube today     Incision site is intact with small area of gaping noted over the center of the incision site right foot. No erythema with minimal edema noted     VASCULAR EXAM:   Pedal pulses: intact 2/4 D/P and P/T.  Skin temperature is warm to warm right and left foot. Digital capillary fill time is 3sec right and left foot.      Neurological Exam:   Light touch protective sensation is absent to both feet. There is noted Loss of protective sensation. There are no Tinel's or 's signs present to the nerves crossing the ankle joint.     MUSCULOSKELETAL EXAM:.   Muscle tone is normal for age and situation. Muscle strength of the flexor and extensor group inversion and eversion Bilateral 5/5.      MYCOTIC NAIL:.   Dystrophic nail 1,2,3,4,5 bilateral. Elongated thickened nails 1,2,3,4,5 bilateral Hypertrophic nails 1,2,3,4,5     DERMATOLOGICAL EXAM:.   Skin is of abnormal texture and turgor with some atrophic skin changes noting decreased hair growth, nail changes (thickening), pigmentary changes, skin texture (thin,shiney), skin color (rubor, red) . R/L Bilateral. There is diffuse xerosis.  There is noted subungual debris           Wound Foot Right;Plantar;Medial (Active)   Dressing Status  Clean, dry, and intact 5/31/2019  7:30 PM   Number of days: 229       Wound Foot Right;Plantar;Lateral (Active)   Dressing Status  Clean, dry, and intact 5/31/2019  7:30 PM   Number of days: 229       Wound Foot Right (Active)   Dressing Status Clean, dry, and intact 6/3/2019 11:00 PM   Dressing Type Elastic bandage 6/3/2019 11:00 PM   Incision Site Well Approximated Yes 6/2/2019 11:00 AM   Number of days: 2        Labs  Recent Results (from the past 24 hour(s))   GLUCOSE, POC    Collection Time: 06/03/19 11:52 AM   Result Value Ref Range    Glucose (POC) 398 (H) 70 - 110 mg/dL   GLUCOSE, POC    Collection Time: 06/03/19  4:36 PM   Result Value Ref Range    Glucose (POC) 250 (H) 70 - 110 mg/dL   GLUCOSE, POC    Collection Time: 06/03/19 10:01 PM   Result Value Ref Range    Glucose (POC) 390 (H) 70 - 110 mg/dL   GLUCOSE, POC    Collection Time: 06/04/19  8:05 AM   Result Value Ref Range    Glucose (POC) 229 (H) 70 - 110 mg/dL     Intra op Cx: Full report on chart    No anaerobes isolated 2 days    Component Value Flag Ref Range Units Status   Special Requests:     Preliminary   BONE AND TISSUE RIGHT FOOT 5TH METATARSAL HEAD    GRAM STAIN RARE WBC'S      Preliminary   GRAM STAIN NO ORGANISMS SEEN      Preliminary   Culture result:      Preliminary   NO GROWTH AFTER 16 HOURS        RADIOGRAPHIC FINDINGS:.  (see report for details)  lytic changes of the fifth distal metatarsal compatible with  osteomyelitis     Procedures:    1.  Right foot fifth metatarsal head resection along with base of the proximal phalanx. 2.  Second pass bone biopsies of the fifth metatarsal and proximal phalanx respectively. 3.  Flap closure.  POD #2                 Paz Zavala DPM  June 4, 2019

## 2019-06-04 NOTE — PROGRESS NOTES
6/3/2019- 20:00- Assessment completed- see flow sheet. Patient has no c/o pain/discomfort. Right foot elevated on 2-3 pillows, drain has scant amount of sero-sanguinous drainage in drain tube.    01:25- Dr Jorge Sierra paged at this time and called to report B/P= 92/59-MAP=70, previous readings and bolus given during the day 6/3/19. Patient has no c/o at this time- easily awakens and oriented x4, good urine output also reported. Will continue to monitor B/p-no new orders at this time. 05:00- B/P=102/68  Patient has no c/o at this time. Continue to monitor. Calllight in reach. Hourly rounding. 07:30- Bed side report given to Emeterio Rosas RN.

## 2019-06-04 NOTE — ROUTINE PROCESS
Bedside and Verbal shift change report given to Salomón Ibarra RN (oncoming nurse) by Lima Marc RN, BSN (offgoing nurse). Report given with SBAR, Kardex, Intake/Output, MAR and Recent Results.

## 2019-06-04 NOTE — PROGRESS NOTES
Infectious Disease Follow-up Note      Date of Admission: 5/29/2019     Date of Note:  6/4/2019    Summary:     40 y/o AAM w/ DM, HTN chronic R lateral plantar ulcer admitted w/new dorsolateral R foot ulcer and OM 5th MT on xray. Transient hypotension promptly responsive to fluids - not septic. Interval History:     Sitting up in chair with right foot elevated on table  . Denies pain. No nausea or diarrhea with antibiotics. Afebrile    Current Antimicrobials: Prior Antimicrobials   Ceftriaxone 6/4 - 0  Post amp 6/2 - 2  Vancom, zosyn 5/29 - 6       Assessment / Plan:      Osteomyelitis, right 5th metatarsal  - acute. Secondary to new dorsolateral ulcer  - xray 5/29: lytic changes distal 5th metatarsal  - MRI 5/31: OM distal 5th MT, base 5th prox phalanx, septic arthritis 5th MTP  - s/p Resxn R 5th MTH, base prox phalanx. 2nd pass bx 5th MT, flap closure 6/2  cx NGTD  - 2nd pass prox phalanx: chronic osteomyelitis -> continue current abx   -> monitor cultures, path  -> d/w DR. Osman. PICC and start Ceftriaxone 2 gm IV q 24 x 6 weeks   Positive blood culture GC Strep  - 1 of 2 blcx 5/29 Group C Streptococcus  - unclear significance (contaminant vs from OM wound infection)  - covered by current abx -> monitor   Transient Hypotension  - promptly resolved with IVF. Likely dehydration. Not septic - appearing.  Now hypertensive -> monitor   Chronic non-healing ulcer, lateral plantar, R foot     DM     HTN        Microbiology:   5/29      blcx IP x2     Lines / Catheters:   piv    Patient Active Problem List   Diagnosis Code    Right foot ulcer, with necrosis of muscle ([address] L97.513    Diabetic polyneuropathy ([address] E11.42    Right foot ulcer, with fat layer exposed ([address] L97.512    Osteomyelitis of right foot ([address] M86.9    Sepsis ([address] A41.9    Left arm weakness R29.898       Current Facility-Administered Medications   Medication Dose Route Frequency    insulin glargine (LANTUS) injection 8 Units  8 Units SubCUTAneous BID    insulin lispro (HUMALOG) injection 3 Units  3 Units SubCUTAneous TIDAC    acetaminophen (TYLENOL) tablet 650 mg  650 mg Oral Q6H PRN    VANCOMYCIN INFORMATION NOTE   Other ONCE    melatonin tablet 3 mg  3 mg Oral QHS PRN    dextrose 10 % infusion 125-250 mL  125-250 mL IntraVENous PRN    vancomycin (VANCOCIN) 1,250 mg in 0.9% sodium chloride 250 mL IVPB  1,250 mg IntraVENous Q12H    sodium chloride (NS) flush 5-10 mL  5-10 mL IntraVENous PRN    aspirin chewable tablet 81 mg  81 mg Oral DAILY    calcium-vitamin D 600 mg(1,500mg) -200 unit per tablet 1 Tab  1 Tab Oral DAILY    gabapentin (NEURONTIN) capsule 300 mg  300 mg Oral TID    simvastatin (ZOCOR) tablet 40 mg  40 mg Oral QHS    0.45% sodium chloride infusion  75 mL/hr IntraVENous CONTINUOUS    heparin (porcine) injection 5,000 Units  5,000 Units SubCUTAneous Q8H    insulin lispro (HUMALOG) injection   SubCUTAneous AC&HS    glucose chewable tablet 16 g  4 Tab Oral PRN    glucagon (GLUCAGEN) injection 1 mg  1 mg IntraMUSCular PRN    VANCOMYCIN INFORMATION NOTE   Other Rx Dosing/Monitoring    piperacillin-tazobactam (ZOSYN) 3.375 g in 0.9% sodium chloride (MBP/ADV) 100 mL \"Extended 4 Hours Infusion ####\"   3.375 g IntraVENous Q8H       Objective:     Visit Vitals  BP 99/62 (BP 1 Location: Right arm, BP Patient Position: At rest)   Pulse 99   Temp 98.5 °F (36.9 °C)   Resp 16   Ht 5' 6\" (1.676 m)   Wt 65.9 kg (145 lb 4.8 oz)   SpO2 100%   BMI 23.45 kg/m²       Temp (24hrs), Av.5 °F (36.9 °C), Min:98.3 °F (36.8 °C), Max:98.7 °F (37.1 °C)      General: Well developed, well nourished 39 y.o.  male in no acute distress.   ENT: ENT exam normal, no neck nodes or sinus tenderness  Head: normocephalic, without obvious abnormality  Mouth:  mucous membranes moist, pharynx normal without lesions  Neck: supple, symmetrical, trachea midline   Cardio:  regular rate and rhythm, S1, S2 normal, no murmur, click, rub or gallop  Lungs: clear to auscultation, no wheezes or rales and unlabored breathing  Abdomen: soft, non-tender. Bowel sounds normal. No masses, no organomegaly.   Extremities:  no redness or tenderness in the calves or thighs, Left foot dressing intact  Neuro: Grossly normal    Lab results     Chemistry  Recent Labs     06/03/19  0438 06/02/19  0430   * 188*    137   K 4.0 4.2    100   CO2 30 30   BUN 16 17   CREA 1.17 0.95   CA 8.2* 8.8   AGAP 7 7   BUCR 14 18       CBC w/ Diff  Recent Labs     06/03/19  0438 06/02/19  0430   WBC 4.9 3.6*   RBC 3.31* 3.48*   HGB 9.5* 10.0*   HCT 31.0* 32.6*    320   GRANS 55 45   LYMPH 34 42   EOS 3 3       Microbiology  All Micro Results     Procedure Component Value Units Date/Time    CULTURE, TISSUE Patrick Dew STAIN [[id number]] Collected:  06/02/19 1206    Order Status:  Completed Specimen:  Foot Updated:  06/04/19 1050     Special Requests: BONE AND TISSUE RIGHT FOOT 5TH METATARSAL HEAD     GRAM STAIN RARE WBC'S         NO ORGANISMS SEEN        Culture result: NO GROWTH 2 DAYS       CULTURE, BLOOD [[id number]] Collected:  05/29/19 1225    Order Status:  Completed Specimen:  Blood Updated:  06/04/19 0856     Special Requests: PERIPHERAL        Culture result: NO GROWTH 6 DAYS       CULTURE, ANAEROBIC [[id number]] Collected:  06/02/19 1206    Order Status:  Completed Specimen:  Foot Updated:  06/04/19 0753     Special Requests: BONE AND TISSUE RIGHT FOOT 5TH METATARSAL HEAD     Culture result:       NO ANAEROBES ISOLATED 2 DAYS          CULTURE, ANAEROBIC [[id number]]     Order Status:  Sent Specimen:  Foot, Right     CULTURE, ANAEROBIC [[id number]]     Order Status:  Sent Specimen:  Foot, Right     CULTURE, BLOOD [[id number]]  (Abnormal) Collected:  05/29/19 1215    Order Status:  Completed Specimen:  Blood Updated:  05/31/19 0732     Special Requests: PERIPHERAL        GRAM STAIN       ANAEROBIC BOTTLE GRAM POSITIVE COCCI IN CHAINS IN PAIRS                  SMEAR CALLED TO AND CORRECTLY REPEATED BY: Julissa Vera RN,3000, ON 5/30/19 AT 0329 TO S           Culture result:       ANAEROBIC BOTTLE STREPTOCOCCI, BETA HEMOLYTIC GROUP C                 Leonard Alamo MD, [facility]  Infectious Disease Specialist  Pager [phone]

## 2019-06-04 NOTE — PROGRESS NOTES
Patient received in bed awake. Patient A&Ox4, denies pain and discomfort. No distress noted. Frequently use items within reach. Bed locked in low position. Call bell within reach and Patient verbalized understanding of use for assistance and needs.

## 2019-06-04 NOTE — ANCILLARY DISCHARGE INSTRUCTIONS
Patient and/or next of kin has been given the [facility] Important Message From Medicare About Your Rights\" letter and all questions were answered. Paper copy signed.

## 2019-06-04 NOTE — PROGRESS NOTES
Problem: Self Care Deficits Care Plan (Adult)  Goal: *Acute Goals and Plan of Care (Insert Text)  Description  Occupational Therapy Goals  Initiated 6/3/2019 within 7 day(s). 1.  Patient will perform functional task in standing for 8 minutes w/ Mod I & 2 rest breaks while abiding by (R)LE NWB 75% of the time to promote dynamic standing tolerance. 2.  Patient will perform toilet transfers with Mod I using LRAD to promote functional independence. 3.  Patient will perform all aspects of toileting with Mod I to promote functional independence. 4.  Patient will utilize energy conservation techniques during functional activities with minimal cues. Outcome: Progressing Towards Goal   OCCUPATIONAL THERAPY TREATMENT    Patient: Michell Singleton (7 y.o. male)  Date: 6/4/2019  Diagnosis: Sepsis ([facility] [A41.9] Sepsis ([facility]  Procedure(s) (LRB):  RIGHT 5TH METATARSAL AND PHALANX RESECTION (Right) 2 Days Post-Op  Precautions: Fall, NWB(RLE)  PLOF: Independent    Chart, occupational therapy assessment, plan of care, and goals were reviewed. ASSESSMENT:  Pt seen for am ADL retraining w/set-up at EOB. Pt requires increase time and assist w/UB dressing task 2/2 multiple line mgt. (see functional levels below) Pt requires min vc's for hand placement w/functional tranfers and demonstrates NWB RLE   precautions 90%. Pt left in chair and reinforced importance of calling for assistance. Progression toward goals:  ?          Improving appropriately and progressing toward goals  ? Improving slowly and progressing toward goals  ? Not making progress toward goals and plan of care will be adjusted     PLAN:  Patient continues to benefit from skilled intervention to address the above impairments. Continue treatment per established plan of care.   Discharge Recommendations:  Home Health  Further Equipment Recommendations for Discharge:  crutches vs rolling walker as determined by PT, extended tubseat bench vs shower chair and wheelchair for community re-entry      SUBJECTIVE:   Patient stated ? Is there any way I could get some crutches instead of a walker? .?    OBJECTIVE DATA SUMMARY:   Cognitive/Behavioral Status:  Neurologic State: Alert  Orientation Level: Oriented X4  Cognition: Follows commands  Safety/Judgement: Fall prevention    Functional Mobility and Transfers for ADLs:   Bed Mobility:  Supine to Sit: Additional time;Modified independent   Transfers:  Sit to Stand: Supervision(w/RW)  Bed to Chair: Contact guard assistance(requires assist for multiple line mgt)  Balance:  Sitting: Intact  Standing: Intact; With support  Standing - Static: Good  Standing - Dynamic : Fair(Fair plus)    ADL Intervention:  Grooming  Washing Face: Supervision  Washing Hands: Supervision  Brushing Teeth: Supervision  Shaving: Supervision  Brushing/Combing Hair: Supervision    Upper Body Bathing  Bathing Assistance: Supervision  Position Performed: Seated edge of bed    Upper Body [address]: Supervision    Pain:  Pain level pre-treatment: 0/10   Pain level post-treatment: 0/10    Activity Tolerance:    Good    Please refer to the flowsheet for vital signs taken during this treatment. After treatment:   ?  Patient left in no apparent distress sitting up in chair  ? Patient left in no apparent distress in bed  ? Call bell left within reach  ? Nursing notified  ? Caregiver present  ? Bed alarm activated    COMMUNICATION/EDUCATION:   ? Role of Occupational Therapy in the acute care setting  ? Home safety education was provided and the patient/caregiver indicated understanding. ? Patient/family have participated as able in working towards goals and plan of care. ? Patient/family agree to work toward stated goals and plan of care. ? Patient understands intent and goals of therapy, but is neutral about his/her participation. ? Patient is unable to participate in goal setting and plan of care.       Thank you for this referral.  EVAN Phillips  Time Calculation: 40 mins

## 2019-06-04 NOTE — PROGRESS NOTES
Problem: Mobility Impaired (Adult and Pediatric)  Goal: *Acute Goals and Plan of Care (Insert Text)  Description  Physical Therapy Goals  Initiated 6/3/2019 and to be accomplished within 7 day(s)  1. Patient will transfer from bed to chair and chair to bed with modified independence using the least restrictive device. 2.  Patient will perform sit to stand with modified independence. 3.  Patient will ambulate with modified independence for 100 feet with the least restrictive device. 4.  Patient will ascend/descend 4 stairs with handrail(s) with modified independence. Outcome: Progressing Towards Goal   PHYSICAL THERAPY TREATMENT    Patient: Briana Gustafson (86 y.o. male)  Date: 6/4/2019  Diagnosis: Sepsis ([facility] [address] [A41.9] Sepsis ([facility] [address]  Procedure(s) (LRB):  RIGHT 5TH METATARSAL AND PHALANX RESECTION (Right) 2 Days Post-Op  Precautions: Fall, NWB(RLE)  PLOF: Independent     ASSESSMENT:  Patient sitting in chair, agreeable to participation with PT. CGA for sit <> stand with axillary crutches. Patient with fair balance; cues for safety with gait. CGA for ambulation x 50 with crutches; patient requires frequent cues for safety and strategy with crutches. Patient fatigued with activity. Returned to chair; MIN A for stand > sit with instruction in transfers with RW. Patient left positioned for comfort with all needs within reach. No c/o pain. Patient educated on role of PT, PT POC, bed mobility, transfers, gait, and safety with mobility as indicated. Progression toward goals:   ?      Improving appropriately and progressing toward goals  ? Improving slowly and progressing toward goals  ? Not making progress toward goals and plan of care will be adjusted     PLAN:  Patient continues to benefit from skilled intervention to address the above impairments. Continue treatment per established plan of care.   Discharge Recommendations:  Home Health  Further Equipment Recommendations for Discharge: rolling walker     SUBJCTIVE:   Patient stated ? I think I'm safer on the crutches. ?    OBJECTIVE DATA SUMMARY:   Critical Behavior:  Neurologic State: Alert  Orientation Level: Oriented X4  Cognition: Follows commands  Safety/Judgement: Fall prevention  Functional Mobility Training:  Transfers:  Sit to Stand: Contact guard assistance  Stand to Sit: Minimum assistance        Bed to Chair: Contact guard assistance(requires assist for multiple line mgt)  Balance:  Sitting: Intact  Sitting - Static: Fair (occasional)  Sitting - Dynamic: Fair (occasional)  Standing: Impaired  Standing - Static: Fair  Standing - Dynamic : Fair     Ambulation/Gait Training:  Distance (ft): 50 Feet (ft)  Assistive Device: Crutches  Ambulation - Level of Assistance: Contact guard assistance        Gait Abnormalities: Altered arm swing;Decreased step clearance        Base of Support: Center of gravity altered     Speed/Lizet: Pace decreased (<100 feet/min)  Step Length: Right shortened;Left shortened  Swing Pattern: Left asymmetrical    Pain:None  Pain level pre-treatment: 0/10  Pain level post-treatment: 0/10   Pain Intervention(s): N/A    Activity Tolerance:   Fair    Please refer to the flowsheet for vital signs taken during this treatment. After treatment:   ? Patient left in no apparent distress sitting up in chair  ? Patient left in no apparent distress in bed  ? Call bell left within reach  ? Nursing notified  ? Caregiver present  ? Bed alarm activated  ? SCDs applied      COMMUNICATION/EDUCATION:   ?         Role of Physical Therapy in the acute care setting. ?         Fall prevention education was provided and the patient/caregiver indicated understanding. ? Patient/family have participated as able in working toward goals and plan of care. ?         Patient/family agree to work toward stated goals and plan of care. ?         Patient understands intent and goals of therapy, but is neutral about his/her participation. ? Patient is unable to participate in stated goals/plan of care: ongoing with therapy staff.   ?         Other:        Jacinda SMITH Bob   Time Calculation: 26 mins

## 2019-06-05 LAB
GLUCOSE BLD STRIP.AUTO-MCNC: 145 MG/DL (ref 70–110)
GLUCOSE BLD STRIP.AUTO-MCNC: 215 MG/DL (ref 70–110)
GLUCOSE BLD STRIP.AUTO-MCNC: 224 MG/DL (ref 70–110)
GLUCOSE BLD STRIP.AUTO-MCNC: 230 MG/DL (ref 70–110)
GLUCOSE BLD STRIP.AUTO-MCNC: 84 MG/DL (ref 70–110)

## 2019-06-05 PROCEDURE — C1751 CATH, INF, PER/CENT/MIDLINE: HCPCS

## 2019-06-05 PROCEDURE — 74011636637 HC RX REV CODE- 636/637: Performed by: PODIATRIST

## 2019-06-05 PROCEDURE — 74011250636 HC RX REV CODE- 250/636: Performed by: INTERNAL MEDICINE

## 2019-06-05 PROCEDURE — 74011250637 HC RX REV CODE- 250/637: Performed by: PODIATRIST

## 2019-06-05 PROCEDURE — 97530 THERAPEUTIC ACTIVITIES: CPT

## 2019-06-05 PROCEDURE — 74011250636 HC RX REV CODE- 250/636: Performed by: PODIATRIST

## 2019-06-05 PROCEDURE — 74011000258 HC RX REV CODE- 258: Performed by: PODIATRIST

## 2019-06-05 PROCEDURE — 74011000258 HC RX REV CODE- 258: Performed by: INTERNAL MEDICINE

## 2019-06-05 PROCEDURE — 65660000000 HC RM CCU STEPDOWN

## 2019-06-05 PROCEDURE — 74011250636 HC RX REV CODE- 250/636: Performed by: FAMILY MEDICINE

## 2019-06-05 PROCEDURE — 74011636637 HC RX REV CODE- 636/637: Performed by: HOSPITALIST

## 2019-06-05 PROCEDURE — 97535 SELF CARE MNGMENT TRAINING: CPT

## 2019-06-05 RX ORDER — SODIUM CHLORIDE 9 MG/ML
500 INJECTION, SOLUTION INTRAVENOUS ONCE
Status: COMPLETED | OUTPATIENT
Start: 2019-06-05 | End: 2019-06-05

## 2019-06-05 RX ORDER — INSULIN LISPRO 100 [IU]/ML
INJECTION, SOLUTION INTRAVENOUS; SUBCUTANEOUS
Status: DISCONTINUED | OUTPATIENT
Start: 2019-06-05 | End: 2019-06-06 | Stop reason: HOSPADM

## 2019-06-05 RX ORDER — SODIUM CHLORIDE 0.9 % (FLUSH) 0.9 %
10 SYRINGE (ML) INJECTION EVERY 24 HOURS
Status: DISCONTINUED | OUTPATIENT
Start: 2019-06-05 | End: 2019-06-06 | Stop reason: HOSPADM

## 2019-06-05 RX ORDER — SODIUM CHLORIDE 0.9 % (FLUSH) 0.9 %
10-30 SYRINGE (ML) INJECTION AS NEEDED
Status: DISCONTINUED | OUTPATIENT
Start: 2019-06-05 | End: 2019-06-06 | Stop reason: HOSPADM

## 2019-06-05 RX ORDER — INSULIN LISPRO 100 [IU]/ML
4 INJECTION, SOLUTION INTRAVENOUS; SUBCUTANEOUS
Status: DISCONTINUED | OUTPATIENT
Start: 2019-06-05 | End: 2019-06-06 | Stop reason: HOSPADM

## 2019-06-05 RX ORDER — INSULIN LISPRO 100 [IU]/ML
3 INJECTION, SOLUTION INTRAVENOUS; SUBCUTANEOUS
Status: DISCONTINUED | OUTPATIENT
Start: 2019-06-05 | End: 2019-06-05

## 2019-06-05 RX ORDER — SODIUM CHLORIDE 0.9 % (FLUSH) 0.9 %
10 SYRINGE (ML) INJECTION AS NEEDED
Status: DISCONTINUED | OUTPATIENT
Start: 2019-06-05 | End: 2019-06-06 | Stop reason: HOSPADM

## 2019-06-05 RX ORDER — INSULIN GLARGINE 100 [IU]/ML
9 INJECTION, SOLUTION SUBCUTANEOUS 2 TIMES DAILY
Status: DISCONTINUED | OUTPATIENT
Start: 2019-06-05 | End: 2019-06-06

## 2019-06-05 RX ORDER — SODIUM CHLORIDE 0.9 % (FLUSH) 0.9 %
10-40 SYRINGE (ML) INJECTION EVERY 8 HOURS
Status: DISCONTINUED | OUTPATIENT
Start: 2019-06-05 | End: 2019-06-06 | Stop reason: HOSPADM

## 2019-06-05 RX ORDER — SODIUM CHLORIDE 0.9 % (FLUSH) 0.9 %
20 SYRINGE (ML) INJECTION EVERY 24 HOURS
Status: DISCONTINUED | OUTPATIENT
Start: 2019-06-05 | End: 2019-06-06 | Stop reason: HOSPADM

## 2019-06-05 RX ORDER — BACITRACIN 500 UNIT/G
1 PACKET (EA) TOPICAL AS NEEDED
Status: DISCONTINUED | OUTPATIENT
Start: 2019-06-05 | End: 2019-06-06 | Stop reason: HOSPADM

## 2019-06-05 RX ADMIN — INSULIN LISPRO 4 UNITS: 100 INJECTION, SOLUTION INTRAVENOUS; SUBCUTANEOUS at 17:15

## 2019-06-05 RX ADMIN — HEPARIN SODIUM 5000 UNITS: 5000 INJECTION INTRAVENOUS; SUBCUTANEOUS at 13:35

## 2019-06-05 RX ADMIN — GABAPENTIN 300 MG: 100 CAPSULE ORAL at 08:58

## 2019-06-05 RX ADMIN — Medication 20 ML: at 13:34

## 2019-06-05 RX ADMIN — Medication 1 TABLET: at 08:57

## 2019-06-05 RX ADMIN — Medication 10 ML: at 13:34

## 2019-06-05 RX ADMIN — SODIUM CHLORIDE 75 ML/HR: 450 INJECTION, SOLUTION INTRAVENOUS at 18:42

## 2019-06-05 RX ADMIN — SODIUM CHLORIDE 2 G: 900 INJECTION INTRAVENOUS at 19:13

## 2019-06-05 RX ADMIN — GABAPENTIN 300 MG: 100 CAPSULE ORAL at 23:19

## 2019-06-05 RX ADMIN — ASPIRIN 81 MG 81 MG: 81 TABLET ORAL at 08:58

## 2019-06-05 RX ADMIN — GABAPENTIN 300 MG: 100 CAPSULE ORAL at 17:14

## 2019-06-05 RX ADMIN — INSULIN LISPRO 6 UNITS: 100 INJECTION, SOLUTION INTRAVENOUS; SUBCUTANEOUS at 08:59

## 2019-06-05 RX ADMIN — INSULIN GLARGINE 8 UNITS: 100 INJECTION, SOLUTION SUBCUTANEOUS at 08:00

## 2019-06-05 RX ADMIN — HEPARIN SODIUM 5000 UNITS: 5000 INJECTION INTRAVENOUS; SUBCUTANEOUS at 05:29

## 2019-06-05 RX ADMIN — INSULIN GLARGINE 9 UNITS: 100 INJECTION, SOLUTION SUBCUTANEOUS at 23:19

## 2019-06-05 RX ADMIN — INSULIN LISPRO 4 UNITS: 100 INJECTION, SOLUTION INTRAVENOUS; SUBCUTANEOUS at 13:33

## 2019-06-05 RX ADMIN — INSULIN LISPRO 3 UNITS: 100 INJECTION, SOLUTION INTRAVENOUS; SUBCUTANEOUS at 09:00

## 2019-06-05 RX ADMIN — SODIUM CHLORIDE 500 ML: 900 INJECTION, SOLUTION INTRAVENOUS at 01:55

## 2019-06-05 RX ADMIN — INSULIN LISPRO 6 UNITS: 100 INJECTION, SOLUTION INTRAVENOUS; SUBCUTANEOUS at 13:33

## 2019-06-05 RX ADMIN — Medication 10 ML: at 22:00

## 2019-06-05 RX ADMIN — SIMVASTATIN 40 MG: 40 TABLET, FILM COATED ORAL at 23:19

## 2019-06-05 RX ADMIN — HEPARIN SODIUM 5000 UNITS: 5000 INJECTION INTRAVENOUS; SUBCUTANEOUS at 23:20

## 2019-06-05 NOTE — DIABETES MGMT
Glycemic Control     Pt w/ hx of Diabetes and w/ hyperglycemia this morning. Fasting POC BG level of 215 mg/dL followed by pre-prandial lunch POC BG of 224 mg/dL. Pt w/ good appetite. Consuming 100% of meals. Currently on Diabetic Consistent Carb Diet 2000 kcal. Pt on Lantus 7 units in the morning and 8 units at bed time, Humalog 3 units w/ meals, Humalog corrective normal insulin sensitivity scale. Recommend: increasing Lantus to 10 units 2x daily,  increase mealtime Humalog to 4 units w/ meals, and continuing corrective coverage normal insulin sensitivity scale. Will continue to monitor and follow-up per policy. Insulin recommendations provided in coordination w/ Heaven August RD.      Brandin Ryder

## 2019-06-05 NOTE — PROGRESS NOTES
Infectious Disease Follow-up Note      Date of Admission: 5/29/2019     Date of Note:  6/5/2019    Summary:     40 y/o AAM w/ DM, HTN chronic R lateral plantar ulcer admitted w/new dorsolateral R foot ulcer and OM 5th MT on xray. Transient hypotension promptly responsive to fluids - not septic. Interval History:     Had PICC placed earlier in right arm. No new complaints. Drain not removed from right foot today. Current Antimicrobials: Prior Antimicrobials   Ceftriaxone 6/4 - 1  Post amp 6/2 - 3  Vancom, zosyn 5/29 - 6       Assessment / Plan:      Osteomyelitis, right 5th metatarsal  - acute. Secondary to new dorsolateral ulcer  - xray 5/29: lytic changes distal 5th metatarsal  - MRI 5/31: OM distal 5th MT, base 5th prox phalanx, septic arthritis 5th MTP  - s/p Resxn R 5th MTH, base prox phalanx. 2nd pass bx 5th MT, flap closure 6/2  cx NGTD  - 2nd pass prox phalanx: chronic osteomyelitis -> continue Ceftriaxone 2 gm IV q 24 x 6 weeks (end date: 7/14/2019)  -> no objection to SNF transfer from ID perspective   Positive blood culture GC Strep  - 1 of 2 blcx 5/29 Group C Streptococcus  - unclear significance (contaminant vs from OM wound infection)   - covered by current abx -> monitor   Transient Hypotension  - promptly resolved with IVF. Likely dehydration. Not septic - appearing.  Now hypertensive -> monitor   Chronic non-healing ulcer, lateral plantar, R foot     DM     HTN        Microbiology:   5/29      blcx IP x2     Lines / Catheters:   piv    Patient Active Problem List   Diagnosis Code    Right foot ulcer, with necrosis of muscle ([address] L97.513    Diabetic polyneuropathy ([address] E11.42    Right foot ulcer, with fat layer exposed ([address] L97.512    Osteomyelitis of right foot ([address] M86.9    Sepsis ([address] A41.9    Left arm weakness R29.898       Current Facility-Administered Medications   Medication Dose Route Frequency    insulin lispro (HUMALOG) injection   SubCUTAneous QID AFTER MEALS    insulin glargine (LANTUS) injection 9 Units  9 Units SubCUTAneous BID    insulin lispro (HUMALOG) injection 4 Units  4 Units SubCUTAneous TIDPC    bacitracin 500 unit/gram packet 1 Packet  1 Packet Topical PRN    sodium chloride (NS) flush 10-30 mL  10-30 mL InterCATHeter PRN    sodium chloride (NS) flush 10 mL  10 mL InterCATHeter Q24H    sodium chloride (NS) flush 10 mL  10 mL InterCATHeter PRN    sodium chloride (NS) flush 10-40 mL  10-40 mL InterCATHeter Q8H    sodium chloride (NS) flush 20 mL  20 mL InterCATHeter Q24H    cefTRIAXone (ROCEPHIN) 2 g in 0.9% sodium chloride (MBP/ADV) 50 mL MBP  2 g IntraVENous Q24H    acetaminophen (TYLENOL) tablet 650 mg  650 mg Oral Q6H PRN    melatonin tablet 3 mg  3 mg Oral QHS PRN    dextrose 10 % infusion 125-250 mL  125-250 mL IntraVENous PRN    sodium chloride (NS) flush 5-10 mL  5-10 mL IntraVENous PRN    aspirin chewable tablet 81 mg  81 mg Oral DAILY    calcium-vitamin D 600 mg(1,500mg) -200 unit per tablet 1 Tab  1 Tab Oral DAILY    gabapentin (NEURONTIN) capsule 300 mg  300 mg Oral TID    simvastatin (ZOCOR) tablet 40 mg  40 mg Oral QHS    0.45% sodium chloride infusion  75 mL/hr IntraVENous CONTINUOUS    heparin (porcine) injection 5,000 Units  5,000 Units SubCUTAneous Q8H    glucose chewable tablet 16 g  4 Tab Oral PRN    glucagon (GLUCAGEN) injection 1 mg  1 mg IntraMUSCular PRN       Objective:     Visit Vitals  /62   Pulse 80   Temp 98.2 °F (36.8 °C)   Resp 15   Ht 5' 6\" (1.676 m)   Wt 65.9 kg (145 lb 4.8 oz)   SpO2 98%   BMI 23.45 kg/m²       Temp (24hrs), Av.4 °F (36.9 °C), Min:98.2 °F (36.8 °C), Max:99.2 °F (37.3 °C)      General: Well developed, well nourished 39 y.o.  male in no acute distress.   ENT: ENT exam normal, no neck nodes or sinus tenderness  Head: normocephalic, without obvious abnormality  Mouth:  mucous membranes moist, pharynx normal without lesions  Neck: supple, symmetrical, trachea midline Cardio:  regular rate and rhythm, S1, S2 normal, no murmur, click, rub or gallop  Lungs: clear to auscultation, no wheezes or rales and unlabored breathing  Abdomen: soft, non-tender. Bowel sounds normal. No masses, no organomegaly.   Extremities:  no redness or tenderness in the calves or thighs, Left foot dressing intact, drain still in place  Neuro: Grossly normal    Lab results     Chemistry  Recent Labs     06/03/19  0438   *      K 4.0      CO2 30   BUN 16   CREA 1.17   CA 8.2*   AGAP 7   BUCR 14       CBC w/ Diff  Recent Labs     06/03/19  0438   WBC 4.9   RBC 3.31*   HGB 9.5*   HCT 31.0*      GRANS 55   LYMPH 34   EOS 3       Microbiology  All Micro Results     Procedure Component Value Units Date/Time    CULTURE, TISSUE Taryn Sarabia STAIN [[id number]] Collected:  06/02/19 1206    Order Status:  Completed Specimen:  Foot Updated:  06/05/19 0902     Special Requests: BONE AND TISSUE RIGHT FOOT 5TH METATARSAL HEAD     GRAM STAIN RARE WBC'S         NO ORGANISMS SEEN        Culture result: NO GROWTH 3 DAYS       CULTURE, ANAEROBIC [[id number]] Collected:  06/02/19 1206    Order Status:  Completed Specimen:  Foot Updated:  06/05/19 0755     Special Requests: BONE AND TISSUE RIGHT FOOT 5TH METATARSAL HEAD     Culture result:       NO ANAEROBES ISOLATED 3 DAYS          CULTURE, BLOOD [[id number]] Collected:  05/29/19 1225    Order Status:  Completed Specimen:  Blood Updated:  06/04/19 0856     Special Requests: PERIPHERAL        Culture result: NO GROWTH 6 DAYS       CULTURE, ANAEROBIC [[id number]] Collected:  06/02/19 1045    Order Status:  Canceled Specimen:  Foot, Right     CULTURE, ANAEROBIC [[id number]] Collected:  06/02/19 1045    Order Status:  Canceled Specimen:  Foot, Right     CULTURE, BLOOD [[id number]]  (Abnormal) Collected:  05/29/19 1215    Order Status:  Completed Specimen:  Blood Updated:  05/31/19 0732     Special Requests: PERIPHERAL        GRAM STAIN       ANAEROBIC BOTTLE [facility] POSITIVE COCCI IN CHAINS IN PAIRS                  SMEAR CALLED TO AND CORRECTLY REPEATED BY: Fabby Dye RN,3000, ON 5/30/19 AT 0329 TO S           Culture result:       ANAEROBIC BOTTLE STREPTOCOCCI, BETA HEMOLYTIC GROUP C                 Jeremy Martin MD, [address] [facility]  Infectious Disease Specialist  Pager [phone]

## 2019-06-05 NOTE — PROGRESS NOTES
Problem: Pain  Goal: *Control of Pain  Outcome: Progressing Towards Goal     Problem: Patient Education: Go to Patient Education Activity  Goal: Patient/Family Education  Outcome: Progressing Towards Goal     Problem: Falls - Risk of  Goal: *Absence of falls  Outcome: Progressing Towards Goal  Goal: *Knowledge of fall prevention  Outcome: Progressing Towards Goal     Problem: Diabetes Self-Management  Goal: *Disease process and treatment process  Description  Define diabetes and identify own type of diabetes; list 3 options for treating diabetes. Outcome: Progressing Towards Goal  Goal: *Incorporating nutritional management into lifestyle  Description  Describe effect of type, amount and timing of food on blood glucose; list 3 methods for planning meals. Outcome: Progressing Towards Goal  Goal: *Incorporating physical activity into lifestyle  Description  State effect of exercise on blood glucose levels. Outcome: Progressing Towards Goal  Goal: *Developing strategies to promote health/change behavior  Description  Define the ABC's of diabetes; identify appropriate screenings, schedule and personal plan for screenings. Outcome: Progressing Towards Goal  Goal: *Using medications safely  Description  State effect of diabetes medications on diabetes; name diabetes medication taking, action and side effects. Outcome: Progressing Towards Goal  Goal: *Monitoring blood glucose, interpreting and using results  Description  Identify recommended blood glucose targets  and personal targets. Outcome: Progressing Towards Goal  Goal: *Prevention, detection, treatment of acute complications  Description  List symptoms of hyper- and hypoglycemia; describe how to treat low blood sugar and actions for lowering  high blood glucose level.   Outcome: Progressing Towards Goal  Goal: *Prevention, detection and treatment of chronic complications  Description  Define the natural course of diabetes and describe the relationship of blood glucose levels to long term complications of diabetes. Outcome: Progressing Towards Goal  Goal: *Developing strategies to address psychosocial issues  Description  Describe feelings about living with diabetes; identify support needed and support network  Outcome: Progressing Towards Goal  Goal: *Sick day guidelines  Outcome: Progressing Towards Goal  Goal: *Patient Specific Goal (EDIT GOAL, INSERT TEXT)  Outcome: Progressing Towards Goal     Problem: Patient Education: Go to Patient Education Activity  Goal: Patient/Family Education  Outcome: Progressing Towards Goal     Problem: Discharge Planning  Goal: *Discharge to safe environment  Outcome: Progressing Towards Goal     Problem: Lower Extremity Wound Care  Goal: *Non-infected wound: Improvement of existing wound, absence of infection, and maintenance of skin integrity  Outcome: Progressing Towards Goal  Goal: *Infected Wound: Prevention of further infection and promotion of healing  Description  Infection control procedures (eg: clean dressings, clean gloves, hand washing, precautions to isolate wound from contamination, sterile instruments used for wound debridement) should be implemented.   Outcome: Progressing Towards Goal  Goal: Interventions  Outcome: Progressing Towards Goal     Problem: Patient Education: Go to Patient Education Activity  Goal: Patient/Family Education  Outcome: Progressing Towards Goal     Problem: Diabetes Maintenance:Admission  Goal: *Blood glucose 80 to 180 mg/dl  Outcome: Progressing Towards Goal     Problem: Patient Education: Go to Patient Education Activity  Goal: Patient/Family Education  Outcome: Progressing Towards Goal     Problem: Patient Education: Go to Patient Education Activity  Goal: Patient/Family Education  Outcome: Progressing Towards Goal     Problem: Nutrition Deficit  Goal: *Optimize nutritional status  Outcome: Progressing Towards Goal

## 2019-06-05 NOTE — DIABETES MGMT
Diabetes Patient/Family Education Record  Factors That  May Influence Patients Ability  to Learn or  Comply with Recommendations   []   Language barrier    []   Cultural needs   []   Motivation    []   Cognitive limitation    []   Physical   []   Education    []   Physiological factors   []   Hearing/vision/speaking impairment   []   Moravian beliefs    []   Financial factors   []  Other:   [x]  No factors identified at this time. Person Instructed:   [x]   Patient   []   Family   []  Other     Preference for Learning:   [x]   Verbal   []   Written   []  Demonstration     Level of Comprehension & Competence:    [x]  Good                                      [] Fair                                     []  Poor                             []  Needs Reinforcement   [x]  Teachback completed    Education Component:   [x]  Medication management, including how to administer insulin (if appropriate) and potential medication interactions    [x]  Nutritional management -obtain usual meal pattern: Pt typically eats a lunch and a late dinner. Pt reports he is just not hungry for breakfast. Pt lives alone and prepares meal for himself. Notes that his blood sugar levels are elevated even when he does not eat. Educated pt on liver's role of producing sugar. Educated pt on importance of consistent meal intake as it pertain to meal time insulin administrations, blood glucose management and consuming enough kcal and protein for wound healing. Discussed importance of pairing proteins and carbohydrate at meal times and snacks for post prandial BG mgt. Educated pt on importance of protein and vitamin C for wound healing. Discussed sources of proteins and vitamin c. Provided multiple handouts including: Diabetic Meal Planning, Healthy Plate Model, Heart Healthy Fast, and Easy Snacks.     []  Exercise   []  Signs, symptoms, and treatment of hyperglycemia and hypoglycemia   [] Prevention, recognition and treatment of hyperglycemia and hypoglycemia   []  Importance of blood glucose monitoring and how to obtain a blood glucose meter    []  Instruction on use of the blood glucose meter   []  Discuss the importance of HbA1C monitoring    []  Sick day guidelines   []  Proper use and disposal of lancets, needles, syringes or insulin pens (if appropriate)   []  Potential long-term complications (retinopathy, kidney disease, neuropathy, foot care)   [] Information about whom to contact in case of emergency or for more information    [x]  Goal:  Patient/family will demonstrate understanding of Diabetes Self Management Skills by: 6/8/19  Plan for post-discharge education or self-management support:    [x] Outpatient class schedule provided            [] Patient Declined    [] Scheduled for outpatient classes (date) _______  Verify:  Does patient understand how diabetes medications work? Yes  Does patient know what their most recent A1c is?  Yes  Does patient monitor glucose at home? ___________________________________________  Does patient have difficulty obtaining diabetes medications or testing supplies? _________________       Brandin Lisa

## 2019-06-05 NOTE — PROGRESS NOTES
Problem: Self Care Deficits Care Plan (Adult)  Goal: *Acute Goals and Plan of Care (Insert Text)  Description  Occupational Therapy Goals  Initiated 6/3/2019 within 7 day(s). 1.  Patient will perform functional task in standing for 8 minutes w/ Mod I & 2 rest breaks while abiding by (R)LE NWB 75% of the time to promote dynamic standing tolerance. 2.  Patient will perform toilet transfers with Mod I using LRAD to promote functional independence. 3.  Patient will perform all aspects of toileting with Mod I to promote functional independence. 4.  Patient will utilize energy conservation techniques during functional activities with minimal cues. Outcome: Progressing Towards Goal   OCCUPATIONAL THERAPY TREATMENT    Patient: Evaristo Gracia (89 y.o. male)  Date: 6/5/2019  Diagnosis: Sepsis ([facility] [address] [A41.9] Sepsis ([facility] [address]  Procedure(s) (LRB):  RIGHT 5TH METATARSAL AND PHALANX RESECTION (Right) 3 Days Post-Op  Precautions: Fall, NWB(RLE)  PLOF: Independent    Chart, occupational therapy assessment, plan of care, and goals were reviewed. ASSESSMENT:  Pt seen for functional mobility and toileting ADL retraining. Pt requires vc's for use of grab bar for improved dynamic standing balance w/clothing mgt and toileting ADL. Pt tolerates standing sinkside performing hand hygiene. Pt maintains NW RLE ~ 90% w/standing activity. Pt left in chair and reinforced importance of calling for assistance. Progression toward goals:  ?          Improving appropriately and progressing toward goals  ? Improving slowly and progressing toward goals  ? Not making progress toward goals and plan of care will be adjusted     PLAN:  Patient continues to benefit from skilled intervention to address the above impairments. Continue treatment per established plan of care.   Discharge Recommendations:  Home Health  Further Equipment Recommendations for Discharge:  rolling walker , shower chair, and wheelchair       SUBJECTIVE: Patient stated ? I can't use the crutches since I got this now. ? reference PICC line    OBJECTIVE DATA SUMMARY:   Cognitive/Behavioral Status:  Neurologic State: Alert  Orientation Level: Oriented X4  Cognition: Follows commands  Safety/Judgement: Fall prevention    Functional Mobility and Transfers for ADLs:   Bed Mobility:  Supine to Sit: Modified independent; Additional time   Transfers:  Sit to Stand: Contact guard assistance  Bed to Chair: Contact guard assistance(w/RW)   Toilet Transfer : Contact guard assistance(w/grab bar)   Bathroom Mobility: Contact guard assistance(w/RW)  Balance:  Sitting: Intact  Sitting - Static: Good (unsupported)  Sitting - Dynamic: Fair (occasional)  Standing: Impaired; With support  Standing - Static: Fair  Standing - Dynamic : Fair(fair/fair minus)    ADL Intervention:  Grooming  Washing Hands: Contact guard assistance(standing sinkside)    Toileting  Toileting Assistance: Contact guard assistance  Clothing Management: Contact guard assistance    Pain:  Pain level pre-treatment: 0/10   Pain level post-treatment: 0/10    Activity Tolerance:    Good    Please refer to the flowsheet for vital signs taken during this treatment. After treatment:   ?  Patient left in no apparent distress sitting up in chair  ? Patient left in no apparent distress in bed  ? Call bell left within reach  ? Nursing notified  ?  mother present  ? Bed alarm activated    COMMUNICATION/EDUCATION:   ? Role of Occupational Therapy in the acute care setting  ? Home safety education was provided and the patient/caregiver indicated understanding. ? Patient/family have participated as able in working towards goals and plan of care. ? Patient/family agree to work toward stated goals and plan of care. ? Patient understands intent and goals of therapy, but is neutral about his/her participation. ? Patient is unable to participate in goal setting and plan of care.       Thank you for this referral.  Tammy Allison EVAN Victoria  Time Calculation: 23 mins

## 2019-06-05 NOTE — PROGRESS NOTES
Assumed care of pt. Pt resting in bed, watching tv. AxOx4. On room air. No c/o pain. NAD. Call light within reach. Will continue to monitor. 36- CNA made this RN aware of pt's BP of 83/46. MAP= 58. Dr. Bonny Anderson called and made aware. Given orders to give pt a 500ml bolus of NS    0155- ordered bolus given. 0310- BP reassessed after bolus was completed. BP now 109/67. MAP= 81. Will continue to monitor. Bedside shift change report given to CAM Miranda (oncoming nurse) by Savanna Sneed RN (offgoing nurse). Report included the following information SBAR, Kardex, Intake/Output, MAR, Accordion, Recent Results and Cardiac Rhythm sinus rhythm.

## 2019-06-05 NOTE — PROGRESS NOTES
Problem: Discharge Planning  Goal: *Discharge to safe environment  Outcome: Progressing Towards Goal    Plan: SNF    Chart reviewed. Noted ID progress note indicating that will need 6 weeks of IV ABX. Pt has ASHLEY, has no home IV ABX coverage. Met with pt & made him aware of above. Gave him his 2 options:  Daily out-pt infusion center or SNF for rehab, wound care & IV ABX. He has chosen SNF, made him aware that has 3 weeks covered @ 100% & then will have co-pay, but could go home & complete @ infusion center. Would like to go to SNF & then infusion center in [place], as his mother lives there. States ok to send to [facility] & he & his mother will choose from accepting beds. Bernardo Burch (SNF) Provider list has been given to the patient and/or patient representative. Posted in e-discharge. Will cont to follow. Lara Colney,NR,ext [phone].

## 2019-06-05 NOTE — PROGRESS NOTES
Podiatry Surgery Progress Note      Patient: Jeri Phillips MRN: [id number]  SSN: [id number]    YOB: 1975  Age: 39 y.o. Sex: male      Assessment:     Patient Active Problem List   Diagnosis Code    Right foot ulcer, with necrosis of muscle ([address] L97.513    Diabetic polyneuropathy ([address] E11.42    Right foot ulcer, with fat layer exposed ([address] L97.512    Osteomyelitis of right foot ([address] M86.9    Sepsis ([address] A41.9    Left arm weakness R29.898          Plan:   Appreciate ID input. Changed the dressing today right foot. Will not pull drain today since it is still productive. From a podiatric view put patient is stable for discharge with HH and POV at the office with Dr Vinay Massey 24-72 post discharge      Total time spent with patient: [phone] [address] discussed with: Patient and Nursing Staff    Discussed:  Care Plan    Disposition:  Stable      Mr. Jenny Vogt is a 39 y.o. male who was seen at bedside with no new complaints today        Subjective:   Past Medical History  Past Medical History:   Diagnosis Date    Diabetes ([address]      Social History     Socioeconomic History    Marital status: SINGLE     Spouse name: Not on file    Number of children: Not on file    Years of education: Not on file    Highest education level: Not on file   Occupational History    Not on file   Social Needs    Financial resource strain: Not on file    Food insecurity:     Worry: Not on file     Inability: Not on file    Transportation needs:     Medical: Not on file     Non-medical: Not on file   Tobacco Use    Smoking status: Current Every Day Smoker     Packs/day: 0.25    Smokeless tobacco: Never Used   Substance and Sexual Activity    Alcohol use:  Yes     Alcohol/week: 3.0 oz     Types: 1 Shots of liquor, 4 Glasses of wine per week     Comment: socially    Drug use: Yes     Frequency: 7.0 times per week     Types: Marijuana    Sexual activity: Not on file   Lifestyle    Physical activity:     Days per week: Not on file     Minutes per session: Not on file    Stress: Not on file   Relationships    Social connections:     Talks on phone: Not on file     Gets together: Not on file     Attends Anabaptism service: Not on file     Active member of club or organization: Not on file     Attends meetings of clubs or organizations: Not on file     Relationship status: Not on file    Intimate partner violence:     Fear of current or ex partner: Not on file     Emotionally abused: Not on file     Physically abused: Not on file     Forced sexual activity: Not on file   Other Topics Concern     Service Not Asked    Blood Transfusions Not Asked    Caffeine Concern Not Asked    Occupational Exposure Not Asked   Skipper Sarna Hazards Not Asked    Sleep Concern Not Asked    Stress Concern Not Asked    Weight Concern Not Asked    Special Diet Not Asked    Back Care Not Asked    Exercise Not Asked    Bike Helmet Not Asked   [address] [facility] Not Asked    Self-Exams Not Asked   Social History Narrative    Not on file       Current Medications  Current Facility-Administered Medications   Medication Dose Route Frequency Provider Last Rate Last Dose    insulin glargine (LANTUS) injection 8 Units  8 Units SubCUTAneous BID Odell Ferrell MD   8 Units at 06/05/19 0800    insulin lispro (HUMALOG) injection 3 Units  3 Units SubCUTAneous TIDAC Odell Ferrell MD   3 Units at 06/05/19 0900    cefTRIAXone (ROCEPHIN) 2 g in 0.9% sodium chloride (MBP/ADV) 50 mL MBP  2 g IntraVENous Q24H Amado Cruz  mL/hr at 06/04/19 1857 2 g at 06/04/19 1857    acetaminophen (TYLENOL) tablet 650 mg  650 mg Oral Q6H PRN Odell Ferrell MD        melatonin tablet 3 mg  3 mg Oral QHS PRN Dulce Gutierrez MD   3 mg at 06/03/19 0051    dextrose 10 % infusion 125-250 mL  125-250 mL IntraVENous PRN Redd Velazquez, DPADDIE        sodium chloride (NS) flush 5-10 mL  5-10 mL IntraVENous PRN Onel Barkley DPM        aspirin chewable tablet 81 mg  81 mg Oral DAILY Onel Barkley DPM   81 mg at 06/05/19 0858    calcium-vitamin D 600 mg(1,500mg) -200 unit per tablet 1 Tab  1 Tab Oral DAILY Onel Barkley DPM   1 Tab at 06/05/19 8118    gabapentin (NEURONTIN) capsule 300 mg  300 mg Oral TID Onel Barkley DPM   300 mg at 06/05/19 5382    simvastatin (ZOCOR) tablet 40 mg  40 mg Oral QHS YAZAN HarrisonM   40 mg at 06/04/19 2225    0.45% sodium chloride infusion  75 mL/hr IntraVENous CONTINUOUS Onel Barkley DPM 75 mL/hr at 06/04/19 1903 75 mL/hr at 06/04/19 1903    heparin (porcine) injection 5,000 Units  5,000 Units SubCUTAneous Q8H Onel Barkley DPM   5,000 Units at 06/05/19 4463    insulin lispro (HUMALOG) injection   SubCUTAneous AC&HS Onel Barkley DPM   6 Units at 06/05/19 4478    glucose chewable tablet 16 g  4 Tab Oral PRN YAZAN HarrisonM   16 g at 05/30/19 5998    glucagon (GLUCAGEN) injection 1 mg  1 mg IntraMUSCular PRN Onel Barkley DPM           Patient Allergies  Allergies   Allergen Reactions    Contrast Agent [Iodine] Rash          Objective:   General Exam  alert, cooperative, no distress, appears stated age    Vitals  Visit Vitals  /60   Pulse 82   Temp 98.4 °F (36.9 °C)   Resp 16   Ht 5' 6\" (1.676 m)   Wt 65.9 kg (145 lb 4.8 oz)   SpO2 99%   BMI 23.45 kg/m²       REVIEW OF SYSTEMS:  General: denies chronic fatigue, weight loss, fever, anemia, bruising, depression, nervousness, panic attacks  HEENT: denies ringing in ears, ear infections, dizzy spells, poor vision, glaucoma, sinus trouble, hoarseness, eye infections  GI: denies diarrhea, gas, bloating, heartburn, regurgitation, difficulty swallowing, painful swallowing, nausea, vomiting, constipation, abdominal pain, decreased appetite, blood in stools, black stools, jaundice, dark urine  Lungs: denies pneumonia, asthma, cough, SOB, hemoptysis  Heart: denies chest pain, irregular heart beat, ankle swelling, HTN  Skin: denies rashes, hives, allergic reaction  Urinary: denies UTI, kidney stones, decreased urine force and flow, urination at night, blood in urine, painful urination  Bones and Joints: denies arthritis, rheumatism, back pain, gout, osteoporosis  Neurologic: denies stroke, seizures, headaches, numbness, tingling    Physical Exam:     Juliano Handy  is a 39 y. o. male who is pleasant, alert and oriented x3, in no apparent distress, and looks their given age. Patient is well-developed and nourished, with good attention to hygiene and body habitus. Mood and affect normal, appropriate to situation.       Lower Extremity Exam: .      Left: 5th metatarsal  DSD intact with no strike through drainage noted right foot.  TLS in place and pulling about 1cc drainage noted in the tube since the PM stift      Incision site is intact with small area of gaping noted over the center of the incision site right foot. No erythema with minimal edema noted     VASCULAR EXAM:   Pedal pulses: intact 2/4 D/P and P/T.  Skin temperature is warm to warm right and left foot. Digital capillary fill time is 3sec right and left foot.      Neurological Exam:   Light touch protective sensation is absent to both feet. There is noted Loss of protective sensation. There are no Tinel's or 's signs present to the nerves crossing the ankle joint.     MUSCULOSKELETAL EXAM:.   Muscle tone is normal for age and situation. Muscle strength of the flexor and extensor group inversion and eversion Bilateral 5/5.      MYCOTIC NAIL:.   Dystrophic nail 1,2,3,4,5 bilateral. Elongated thickened nails 1,2,3,4,5 bilateral Hypertrophic nails 1,2,3,4,5     DERMATOLOGICAL EXAM:.   Skin is of abnormal texture and turgor with some atrophic skin changes noting decreased hair growth, nail changes (thickening), pigmentary changes, skin texture (thin,shiney), skin color (rubor, red) . R/L Bilateral. There is diffuse xerosis.  There is noted subungual debris        Wound Foot Right;Plantar;Medial (Active)   Dressing Status  Clean, dry, and intact 5/31/2019  7:30 PM   Number of days: 230       Wound Foot Right;Plantar;Lateral (Active)   Dressing Status  Clean, dry, and intact 5/31/2019  7:30 PM   Number of days: 230       Wound Foot Right (Active)   Dressing Status Clean, dry, and intact 6/3/2019 11:00 PM   Dressing Type Elastic bandage 6/3/2019 11:00 PM   Incision Site Well Approximated Yes 6/2/2019 11:00 AM   Drainage Amount Scant 6/5/2019  8:46 AM   Number of days: 3        Labs  Recent Results (from the past 24 hour(s))   GLUCOSE, POC    Collection Time: 06/04/19 11:39 AM   Result Value Ref Range    Glucose (POC) 183 (H) 70 - 110 mg/dL   GLUCOSE, POC    Collection Time: 06/04/19  4:57 PM   Result Value Ref Range    Glucose (POC) 270 (H) 70 - 110 mg/dL   VANCOMYCIN, TROUGH    Collection Time: 06/04/19  7:30 PM   Result Value Ref Range    Vancomycin,trough 18.4 10.0 - 20.0 ug/mL    Reported dose date: COLLECTED BY NURSING STAFF [facility])      Reported dose time: COLLECTED BY NURSING STAFF [facility])      Reported dose: COLLECTED BY NURSING STAFF [facility]) UNITS   GLUCOSE, POC    Collection Time: 06/04/19 10:39 PM   Result Value Ref Range    Glucose (POC) 230 (H) 70 - 110 mg/dL   GLUCOSE, POC    Collection Time: 06/05/19  8:04 AM   Result Value Ref Range    Glucose (POC) 215 (H) 70 - 110 mg/dL     Specimen Information: Foot        Component Value Flag Ref Range Units Status   Special Requests:     Preliminary   BONE AND TISSUE RIGHT FOOT 5TH METATARSAL HEAD    GRAM STAIN RARE WBC'S      Preliminary   GRAM STAIN NO ORGANISMS SEEN      Preliminary   Culture result: NO GROWTH 3 DAYS      Preliminary   Lab and Collection     CULTURE, TISSUE Faraz Kalyani (Order: [id number]) - 6/2/2019       Component Value Flag Ref Range Units Status   Special Requests:     Preliminary   BONE AND TISSUE RIGHT FOOT 5TH METATARSAL HEAD    Culture result:      Preliminary   NO ANAEROBES ISOLATED 3 DAYS    Lab and Collection     CULTURE, ANAEROBIC (Order: [id number]) - 6/2/2019     RADIOGRAPHIC FINDINGS:.  (see report for details)  lytic changes of the fifth distal metatarsal compatible with  osteomyelitis     Procedures:    1.  Right foot fifth metatarsal head resection along with base of the proximal phalanx. 2.  Second pass bone biopsies of the fifth metatarsal and proximal phalanx respectively.   3.  Flap closure. POD #3                   Adrian Cavanaugh DPM  June 5, 2019

## 2019-06-05 NOTE — ROUTINE PROCESS
4 FR single lumen Power PICC Solo inserted without difficulty in Right UE brachial vein with US guidance utilizing 3CG for SVC tip verification. Released for use, CAM Rey notified. 1 ml 1% lidocaine injected SC at insertion site for local anesthetic.

## 2019-06-05 NOTE — PROGRESS NOTES
4090: Bedside shift change report given to Ruben RN (oncoming nurse) by Stephanie Coronel RN (offgoing nurse). Report included the following information SBAR, Kardex, Procedure Summary, Intake/Output, MAR and Cardiac Rhythm SR. .     9972: Patient getting teaching about PICC line placement with [facility]. 1910: Bedside shift change report given to Chapito Salinas Dr (oncoming nurse) [facility]   (offgoing nurse).  Report included the following information SBAR, Kardex, Procedure Summary, Intake/Output, MAR and Cardiac Rhythm SR.

## 2019-06-06 ENCOUNTER — HOSPITAL ENCOUNTER (INPATIENT)
Age: 44
LOS: 22 days | Discharge: HOME HEALTH | End: 2019-06-28
Attending: INTERNAL MEDICINE | Admitting: INTERNAL MEDICINE

## 2019-06-06 VITALS
HEIGHT: 66 IN | RESPIRATION RATE: 18 BRPM | TEMPERATURE: 98.7 F | WEIGHT: 145.3 LBS | DIASTOLIC BLOOD PRESSURE: 69 MMHG | HEART RATE: 101 BPM | SYSTOLIC BLOOD PRESSURE: 104 MMHG | OXYGEN SATURATION: 99 % | BODY MASS INDEX: 23.35 KG/M2

## 2019-06-06 LAB
GLUCOSE BLD STRIP.AUTO-MCNC: 146 MG/DL (ref 70–110)
GLUCOSE BLD STRIP.AUTO-MCNC: 220 MG/DL (ref 70–110)
GLUCOSE BLD STRIP.AUTO-MCNC: 241 MG/DL (ref 70–110)
GLUCOSE BLD STRIP.AUTO-MCNC: 72 MG/DL (ref 70–110)

## 2019-06-06 PROCEDURE — 97530 THERAPEUTIC ACTIVITIES: CPT

## 2019-06-06 PROCEDURE — 74011250636 HC RX REV CODE- 250/636: Performed by: PODIATRIST

## 2019-06-06 PROCEDURE — 74011250637 HC RX REV CODE- 250/637: Performed by: INTERNAL MEDICINE

## 2019-06-06 PROCEDURE — 74011250637 HC RX REV CODE- 250/637: Performed by: PODIATRIST

## 2019-06-06 PROCEDURE — 82962 GLUCOSE BLOOD TEST: CPT

## 2019-06-06 PROCEDURE — 74011000258 HC RX REV CODE- 258: Performed by: PODIATRIST

## 2019-06-06 PROCEDURE — 74011250637 HC RX REV CODE- 250/637: Performed by: HOSPITALIST

## 2019-06-06 PROCEDURE — 97535 SELF CARE MNGMENT TRAINING: CPT

## 2019-06-06 PROCEDURE — 74011636637 HC RX REV CODE- 636/637: Performed by: INTERNAL MEDICINE

## 2019-06-06 PROCEDURE — 74011636637 HC RX REV CODE- 636/637: Performed by: HOSPITALIST

## 2019-06-06 PROCEDURE — 74011250636 HC RX REV CODE- 250/636: Performed by: INTERNAL MEDICINE

## 2019-06-06 PROCEDURE — 74011000258 HC RX REV CODE- 258: Performed by: INTERNAL MEDICINE

## 2019-06-06 RX ORDER — SIMVASTATIN 40 MG/1
40 TABLET, FILM COATED ORAL
Status: DISCONTINUED | OUTPATIENT
Start: 2019-06-06 | End: 2019-06-28 | Stop reason: HOSPADM

## 2019-06-06 RX ORDER — INSULIN GLARGINE 100 [IU]/ML
10 INJECTION, SOLUTION SUBCUTANEOUS 2 TIMES DAILY
Qty: 1 ADJUSTABLE DOSE PRE-FILLED PEN SYRINGE | Refills: 0 | Status: SHIPPED
Start: 2019-06-06 | End: 2019-06-28

## 2019-06-06 RX ORDER — AMOXICILLIN 250 MG
1 CAPSULE ORAL 2 TIMES DAILY
Status: DISCONTINUED | OUTPATIENT
Start: 2019-06-06 | End: 2019-06-06 | Stop reason: HOSPADM

## 2019-06-06 RX ORDER — INSULIN LISPRO 100 [IU]/ML
4 INJECTION, SOLUTION INTRAVENOUS; SUBCUTANEOUS
Qty: 1 VIAL | Refills: 0 | Status: SHIPPED
Start: 2019-06-06 | End: 2019-06-28

## 2019-06-06 RX ORDER — MAGNESIUM SULFATE 100 %
4 CRYSTALS MISCELLANEOUS AS NEEDED
Status: DISCONTINUED | OUTPATIENT
Start: 2019-06-06 | End: 2019-06-28 | Stop reason: HOSPADM

## 2019-06-06 RX ORDER — GABAPENTIN 300 MG/1
300 CAPSULE ORAL 3 TIMES DAILY
Status: DISCONTINUED | OUTPATIENT
Start: 2019-06-06 | End: 2019-06-28 | Stop reason: HOSPADM

## 2019-06-06 RX ORDER — INSULIN LISPRO 100 [IU]/ML
INJECTION, SOLUTION INTRAVENOUS; SUBCUTANEOUS
Status: DISCONTINUED | OUTPATIENT
Start: 2019-06-06 | End: 2019-06-10

## 2019-06-06 RX ORDER — INSULIN GLARGINE 100 [IU]/ML
10 INJECTION, SOLUTION SUBCUTANEOUS 2 TIMES DAILY
Status: DISCONTINUED | OUTPATIENT
Start: 2019-06-06 | End: 2019-06-07

## 2019-06-06 RX ORDER — INSULIN GLARGINE 100 [IU]/ML
10 INJECTION, SOLUTION SUBCUTANEOUS 2 TIMES DAILY
Status: DISCONTINUED | OUTPATIENT
Start: 2019-06-06 | End: 2019-06-06 | Stop reason: HOSPADM

## 2019-06-06 RX ORDER — GUAIFENESIN 100 MG/5ML
81 LIQUID (ML) ORAL DAILY
Status: DISCONTINUED | OUTPATIENT
Start: 2019-06-07 | End: 2019-06-28 | Stop reason: HOSPADM

## 2019-06-06 RX ORDER — INSULIN LISPRO 100 [IU]/ML
4 INJECTION, SOLUTION INTRAVENOUS; SUBCUTANEOUS
Status: DISCONTINUED | OUTPATIENT
Start: 2019-06-06 | End: 2019-06-10

## 2019-06-06 RX ORDER — POLYETHYLENE GLYCOL 3350 17 G/17G
17 POWDER, FOR SOLUTION ORAL DAILY
Status: DISCONTINUED | OUTPATIENT
Start: 2019-06-06 | End: 2019-06-06 | Stop reason: HOSPADM

## 2019-06-06 RX ORDER — CALCIUM CARBONATE/VITAMIN D3 600MG-5MCG
1 TABLET ORAL 2 TIMES DAILY WITH MEALS
Status: DISCONTINUED | OUTPATIENT
Start: 2019-06-06 | End: 2019-06-28 | Stop reason: HOSPADM

## 2019-06-06 RX ADMIN — POLYETHYLENE GLYCOL 3350 17 G: 17 POWDER, FOR SOLUTION ORAL at 15:50

## 2019-06-06 RX ADMIN — INSULIN LISPRO 4 UNITS: 100 INJECTION, SOLUTION INTRAVENOUS; SUBCUTANEOUS at 18:33

## 2019-06-06 RX ADMIN — Medication 20 ML: at 13:43

## 2019-06-06 RX ADMIN — INSULIN GLARGINE 10 UNITS: 100 INJECTION, SOLUTION SUBCUTANEOUS at 22:13

## 2019-06-06 RX ADMIN — INSULIN LISPRO 6 UNITS: 100 INJECTION, SOLUTION INTRAVENOUS; SUBCUTANEOUS at 13:37

## 2019-06-06 RX ADMIN — ASPIRIN 81 MG 81 MG: 81 TABLET ORAL at 08:57

## 2019-06-06 RX ADMIN — INSULIN LISPRO 6 UNITS: 100 INJECTION, SOLUTION INTRAVENOUS; SUBCUTANEOUS at 08:57

## 2019-06-06 RX ADMIN — GABAPENTIN 300 MG: 100 CAPSULE ORAL at 15:49

## 2019-06-06 RX ADMIN — Medication 1 TABLET: at 08:57

## 2019-06-06 RX ADMIN — SODIUM CHLORIDE 75 ML/HR: 450 INJECTION, SOLUTION INTRAVENOUS at 06:36

## 2019-06-06 RX ADMIN — Medication 10 ML: at 06:00

## 2019-06-06 RX ADMIN — HEPARIN SODIUM 5000 UNITS: 5000 INJECTION INTRAVENOUS; SUBCUTANEOUS at 06:35

## 2019-06-06 RX ADMIN — HEPARIN SODIUM 5000 UNITS: 5000 INJECTION INTRAVENOUS; SUBCUTANEOUS at 13:38

## 2019-06-06 RX ADMIN — Medication 1 TABLET: at 19:15

## 2019-06-06 RX ADMIN — Medication 10 ML: at 13:44

## 2019-06-06 RX ADMIN — GABAPENTIN 300 MG: 300 CAPSULE ORAL at 22:13

## 2019-06-06 RX ADMIN — INSULIN GLARGINE 9 UNITS: 100 INJECTION, SOLUTION SUBCUTANEOUS at 08:58

## 2019-06-06 RX ADMIN — GABAPENTIN 300 MG: 100 CAPSULE ORAL at 08:57

## 2019-06-06 RX ADMIN — INSULIN LISPRO 4 UNITS: 100 INJECTION, SOLUTION INTRAVENOUS; SUBCUTANEOUS at 08:58

## 2019-06-06 RX ADMIN — Medication 10 ML: at 13:43

## 2019-06-06 RX ADMIN — CEFTRIAXONE SODIUM 2 G: 2 INJECTION, POWDER, FOR SOLUTION INTRAMUSCULAR; INTRAVENOUS at 19:15

## 2019-06-06 RX ADMIN — INSULIN LISPRO 4 UNITS: 100 INJECTION, SOLUTION INTRAVENOUS; SUBCUTANEOUS at 13:38

## 2019-06-06 RX ADMIN — SIMVASTATIN 40 MG: 40 TABLET, FILM COATED ORAL at 22:13

## 2019-06-06 NOTE — PROGRESS NOTES
Problem: Self Care Deficits Care Plan (Adult)  Goal: *Acute Goals and Plan of Care (Insert Text)  Description  Occupational Therapy Goals  Initiated 6/3/2019 within 7 day(s). 1.  Patient will perform functional task in standing for 8 minutes w/ Mod I & 2 rest breaks while abiding by (R)LE NWB 75% of the time to promote dynamic standing tolerance. 2.  Patient will perform toilet transfers with Mod I using LRAD to promote functional independence. 3.  Patient will perform all aspects of toileting with Mod I to promote functional independence. 4.  Patient will utilize energy conservation techniques during functional activities with minimal cues. Outcome: Progressing Towards Goal   OCCUPATIONAL THERAPY TREATMENT    Patient: Vivi Ross (21 y.o. male)  Date: 6/6/2019  Diagnosis: Sepsis ([facility] [A41.9] Sepsis ([facility]  Procedure(s) (LRB):  RIGHT 5TH METATARSAL AND PHALANX RESECTION (Right) 4 Days Post-Op  Precautions: Fall, NWB(RLE)  PLOF: Independent w/ ADLs. Chart, occupational therapy assessment, plan of care, and goals were reviewed. ASSESSMENT:  Upon entering room pt seated in chair; agreeable to OT tx. CAM Miranda present briefly to administer meds. Pt performed functional mobility from chair to toilet w/ NWB compliance 75% of the time, performed toileting w/ CGA, stood at sink to perform hand hygiene w/ CGA & Vcs for safe hand placement. Pt returned to chair w/ use of RW & CGA. LB bathing & dressing w/ CGA while standing & use of Rw. Educated pt on WB precautions on (R)LE while in standing & removal of floor hazards to reduce risk of falls & LB bathing/dressing compensation techniques. Pt left in chair, needs within reach, & CAM Miranda notified of pt's status. Progression toward goals:  ?          Improving appropriately and progressing toward goals  ? Improving slowly and progressing toward goals  ?           Not making progress toward goals and plan of care will be adjusted     PLAN:  Patient continues to benefit from skilled intervention to address the above impairments. Continue treatment per established plan of care. Discharge Recommendations:  Bernardo Burch  Further Equipment Recommendations for Discharge:  TBD at next level of care. SUBJECTIVE:   Patient stated can I trade in my crutches for a knee walker?     OBJECTIVE DATA SUMMARY:   Cognitive/Behavioral Status:  Neurologic State: Alert  Orientation Level: Oriented X4  Cognition: Follows commands  Safety/Judgement: Fall prevention    Functional Mobility and Transfers for ADLs:   Transfers:  Sit to Stand: Contact guard assistance  Stand to Sit: Contact guard assistance   Toilet Transfer : Contact guard assistance   Bathroom Mobility: Contact guard assistance    Balance:  Sitting: Impaired  Sitting - Static: Good (unsupported)  Sitting - Dynamic: Good (unsupported)  Standing: Impaired  Standing - Static: Fair  Standing - Dynamic : Fair    ADL Intervention:   Pt performed toileting w/ CGA, stood at sink to perform hand hygiene w/ CGA, LB bathing & dressing w/ CGA while standing & use of Rw. Lower Body Bathing  Bathing Assistance: Contact guard assistance  Perineal  : Contact guard assistance  Position Performed: Standing  Cues: Verbal cues provided  Lower Body : Contact guard assistance  Position Performed: Standing  Cues: Verbal cues provided    Lower Body Dressing Assistance  Dressing Assistance: Contact guard assistance  Pants With Elastic Waist: Contact guard assistance    Toileting  Toileting Assistance: Contact guard assistance  Bladder Hygiene: Contact guard assistance  Bowel Hygiene: Contact guard assistance  Clothing Management: Contact guard assistance    Therapeutic Activity:   Pt performed functional mobility from chair to toilet & back w/ use of RW & CGA. Pt performed standing to perform LB ADL w/ CGA.     Pain:  Pain level pre-treatment: 0/10   Pain level post-treatment: 0/10    Activity Tolerance:    Fair  Please refer to the flowsheet for vital signs taken during this treatment. After treatment:   ?  Patient left in no apparent distress sitting up in chair  ? Patient left in no apparent distress in bed  ? Call bell left within reach  ? Nursing Ruben notified  ? Caregiver present  ? Bed alarm activated    COMMUNICATION/EDUCATION: Educated pt on removal of floor hazards to reduce risk of falls. ? Role of Occupational Therapy in the acute care setting  ? Home safety education was provided and the patient/caregiver indicated understanding. ? Patient/family have participated as able in working towards goals and plan of care. ? Patient/family agree to work toward stated goals and plan of care. ? Patient understands intent and goals of therapy, but is neutral about his/her participation. ? Patient is unable to participate in goal setting and plan of care.       Thank you for this referral.  Demetrius Knox  Time Calculation: 29 mins

## 2019-06-06 NOTE — PROGRESS NOTES
Internal Medicine Progress Note NAME: Primitivo Pedroza :  1975 MRM:  [id number] Date/Time: 2019 ASSESSMENT/PLAN:    
 
#   Osteomyelitis of right foot ([address] (2019). MRI positive for  OM. Possible the source of the infection. ID and podiatry consulted. As above. Pt had surgery by Dr. Gilda Marin on  (right 5th metatarsal head resection). Pathology of bone biopsy shows osteomyelitis. D/w Dr. Juan Carlos Fairchild who said pt would need 6 weeks of IV rocephin. Pt electing to go to SNF, CM working on placement. PICC  Placed awaiting placement to SNF. #  Sepsis ([address] (2019) , likely from foot infection. Blood and ulcer CS. ID consulted, cont vanc and zosyn. Tele bed. Maintain vital signs. IVF. Lactic acid. Presented with hypotension and tachycardia 
- blood C/S , one bottle positive for GPC, anaerobic positive for  BHS  
  
# Dehydration. IVF. Strict I/O  
  
#   Right foot ulcer, with necrosis of muscle ([address] (10/18/2018). Podiatrist consulted and performed right foot 5th metatarsal head resection and bone biopsies 
  
 
#   Left arm weakness (2019) from previous injury in his neck. Stable. 
  
 # DM . Provide SSI, hypoglycemia protocol and frequent Accu checks.  
  
# Leukopenia. trend -DVT prophylaxis :  heparin.  
- Code Status : FULL Lab Review: No results for input(s): WBC, HGB, HCT, PLT, HGBEXT, HCTEXT, PLTEXT, HGBEXT, HCTEXT, PLTEXT in the last 72 hours. No results for input(s): NA, K, CL, CO2, GLU, BUN, CREA, CA, MG, PHOS, ALB, TBIL, TBILI, SGOT, ALT, INR in the last 72 hours. No lab exists for component: INREXT, INREXT Lab Results Component Value Date/Time  Glucose (POC) 241 (H) 2019 07:57 AM  
 Glucose (POC) 84 2019 10:12 PM  
 Glucose (POC) 145 (H) 2019 04:29 PM  
 Glucose (POC) 224 (H) 2019 12:50 PM  
 Glucose (POC) 215 (H) 2019 08:04 AM  
 Glucose, POC 75 2019 12:25 PM  
 
 No results for input(s): PH, PCO2, PO2, HCO3, FIO2 in the last 72 hours. No results for input(s): INR in the last 72 hours. No lab exists for component: INREXT, INREXT No results found for: SDES Lab Results Component Value Date/Time Culture result: NO GROWTH 4 DAYS 06/02/2019 12:06 PM  
 Culture result: NO ANAEROBES ISOLATED 4 DAYS 06/02/2019 12:06 PM  
 Culture result: NO GROWTH 6 DAYS 05/29/2019 12:25 PM  
  
  
Subjective: Chief Complaint:     
Pt sitting in chair watching a show Denies complaints Understand he will need long term IV AB Wants to go to SNF for IVAB, prefers [facility] 
CM working on placement Objective:  
 
Vitals: 
Last 24hrs VS reviewed since prior progress note. Most recent are: 
 
Visit Vitals /71 (BP 1 Location: Left arm, BP Patient Position: At rest) Pulse 87 Temp 98 °F (36.7 °C) Resp 18 Ht 5' 6\" (1.676 m) Wt 65.9 kg (145 lb 4.8 oz) SpO2 96% BMI 23.45 kg/m² SpO2 Readings from Last 6 Encounters:  
06/06/19 96% 11/15/18 98% 11/01/18 100% 10/18/18 100% 02/23/13 99% Intake/Output Summary (Last 24 hours) at 6/6/2019 1026 Last data filed at 6/6/2019 5234 Gross per 24 hour Intake 900 ml Output 3025 ml Net -2125 ml Physical Exam:  
Gen:  Appear stated age, Well-developed, well-nourished, in no acute distress HEENT:  Head atraumatic, normocephalic , hearing intact to voice, moist mucous membranes. Neck:  Supple, no masses I appreciate, thyroid non-tender. Resp:  No accessory muscle use,Bilateral BS present, clear breath sounds without wheezes rales or rhonchi 
Card:  No murmurs, normal S1, S2 without thrills, bruits or peripheral edema. Abd:  Soft, non-tender, non-distended, normoactive bowel sounds are present, no palpable organomegaly Musc:  No cyanosis or clubbing. Skin: right foot wrapped in ACE, skin turgor is good. Neuro:  Cranial nerves are grossly intact,  follows commands appropriately. alert. Medications Reviewed: (see below) Lab Data Reviewed: (see below) 
 
______________________________________________________________________ Medications:  
 
Current Facility-Administered Medications Medication Dose Route Frequency  insulin glargine (LANTUS) injection 10 Units  10 Units SubCUTAneous BID  insulin lispro (HUMALOG) injection   SubCUTAneous QID AFTER MEALS  insulin lispro (HUMALOG) injection 4 Units  4 Units SubCUTAneous TIDPC  bacitracin 500 unit/gram packet 1 Packet  1 Packet Topical PRN  
 sodium chloride (NS) flush 10-30 mL  10-30 mL InterCATHeter PRN  
 sodium chloride (NS) flush 10 mL  10 mL InterCATHeter Q24H  
 sodium chloride (NS) flush 10 mL  10 mL InterCATHeter PRN  
 sodium chloride (NS) flush 10-40 mL  10-40 mL InterCATHeter Q8H  
 sodium chloride (NS) flush 20 mL  20 mL InterCATHeter Q24H  cefTRIAXone (ROCEPHIN) 2 g in 0.9% sodium chloride (MBP/ADV) 50 mL MBP  2 g IntraVENous Q24H  
 acetaminophen (TYLENOL) tablet 650 mg  650 mg Oral Q6H PRN  
 melatonin tablet 3 mg  3 mg Oral QHS PRN  
 dextrose 10 % infusion 125-250 mL  125-250 mL IntraVENous PRN  
 sodium chloride (NS) flush 5-10 mL  5-10 mL IntraVENous PRN  
 aspirin chewable tablet 81 mg  81 mg Oral DAILY  calcium-vitamin D 600 mg(1,500mg) -200 unit per tablet 1 Tab  1 Tab Oral DAILY  gabapentin (NEURONTIN) capsule 300 mg  300 mg Oral TID  simvastatin (ZOCOR) tablet 40 mg  40 mg Oral QHS  
 0.45% sodium chloride infusion  75 mL/hr IntraVENous CONTINUOUS  
 heparin (porcine) injection 5,000 Units  5,000 Units SubCUTAneous Q8H  
 glucose chewable tablet 16 g  4 Tab Oral PRN  
 glucagon (GLUCAGEN) injection 1 mg  1 mg IntraMUSCular PRN Total time spent with patient: 25 minutes Care Plan discussed with: Patient and Nursing Staff Discussed:  Care Plan Prophylaxis:  Hep SQ Disposition:  Home w/Family Attending Physician: Rissa Hernandez MD

## 2019-06-06 NOTE — PROGRESS NOTES
[address] [place] [address] Rd pt care from [place], RN. Pt in bed, alert and oriented x 4. Not in any form of distress. Denies pain. Frequent use items and call bell within reach. Verbalized understanding to call for assistance. Bed locked in lowest position. No changes throughout the night. Bedside and Verbal shift change report given to CAM Miranda (oncoming nurse) by Shin Romero RN (offgoing nurse). Report included the following information SBAR, Kardex, Intake/Output, MAR and Recent Results.

## 2019-06-06 NOTE — ANCILLARY DISCHARGE INSTRUCTIONS
Patient and/or next of kin has been given the [facility] Important Message From Medicare About Your Rights\" letter and all questions were answered. Paper copy signed.

## 2019-06-06 NOTE — PROGRESS NOTES
Problem: Pain  Goal: *Control of Pain  Outcome: Progressing Towards Goal     Problem: Patient Education: Go to Patient Education Activity  Goal: Patient/Family Education  Outcome: Progressing Towards Goal     Problem: Falls - Risk of  Goal: *Absence of falls  Outcome: Progressing Towards Goal  Goal: *Knowledge of fall prevention  Outcome: Progressing Towards Goal     Problem: Diabetes Self-Management  Goal: *Disease process and treatment process  Description  Define diabetes and identify own type of diabetes; list 3 options for treating diabetes. Outcome: Progressing Towards Goal  Goal: *Incorporating nutritional management into lifestyle  Description  Describe effect of type, amount and timing of food on blood glucose; list 3 methods for planning meals. Outcome: Progressing Towards Goal  Goal: *Incorporating physical activity into lifestyle  Description  State effect of exercise on blood glucose levels. Outcome: Progressing Towards Goal  Goal: *Developing strategies to promote health/change behavior  Description  Define the ABC's of diabetes; identify appropriate screenings, schedule and personal plan for screenings. Outcome: Progressing Towards Goal  Goal: *Using medications safely  Description  State effect of diabetes medications on diabetes; name diabetes medication taking, action and side effects. Outcome: Progressing Towards Goal  Goal: *Monitoring blood glucose, interpreting and using results  Description  Identify recommended blood glucose targets  and personal targets. Outcome: Progressing Towards Goal  Goal: *Prevention, detection, treatment of acute complications  Description  List symptoms of hyper- and hypoglycemia; describe how to treat low blood sugar and actions for lowering  high blood glucose level.   Outcome: Progressing Towards Goal  Goal: *Prevention, detection and treatment of chronic complications  Description  Define the natural course of diabetes and describe the relationship of blood glucose levels to long term complications of diabetes. Outcome: Progressing Towards Goal  Goal: *Developing strategies to address psychosocial issues  Description  Describe feelings about living with diabetes; identify support needed and support network  Outcome: Progressing Towards Goal  Goal: *Sick day guidelines  Outcome: Progressing Towards Goal  Goal: *Patient Specific Goal (EDIT GOAL, INSERT TEXT)  Outcome: Progressing Towards Goal     Problem: Patient Education: Go to Patient Education Activity  Goal: Patient/Family Education  Outcome: Progressing Towards Goal     Problem: Discharge Planning  Goal: *Discharge to safe environment  Outcome: Progressing Towards Goal     Problem: Lower Extremity Wound Care  Goal: *Non-infected wound: Improvement of existing wound, absence of infection, and maintenance of skin integrity  Outcome: Progressing Towards Goal  Goal: *Infected Wound: Prevention of further infection and promotion of healing  Description  Infection control procedures (eg: clean dressings, clean gloves, hand washing, precautions to isolate wound from contamination, sterile instruments used for wound debridement) should be implemented.   Outcome: Progressing Towards Goal  Goal: Interventions  Outcome: Progressing Towards Goal     Problem: Patient Education: Go to Patient Education Activity  Goal: Patient/Family Education  Outcome: Progressing Towards Goal     Problem: Diabetes Maintenance:Admission  Goal: *Blood glucose 80 to 180 mg/dl  Outcome: Progressing Towards Goal     Problem: Nutrition Deficit  Goal: *Optimize nutritional status  Outcome: Progressing Towards Goal

## 2019-06-06 NOTE — PROGRESS NOTES
Problem: Discharge Planning  Goal: *Discharge to safe environment  Outcome: Progressing Towards Goal    Plan: SNF    Spoke with patient in room, he stated that he is interested in being matched out to [facility] [employer]. He stated that he lives in the [facility] and and he stated that his his 1st choice is [facility].  Have submitted to [facility] facilities per patient request.

## 2019-06-06 NOTE — PROGRESS NOTES
0725: Bedside shift change report given to Ruben RN (oncoming nurse) by Sharri Osborn RN (offgoing nurse). Report included the following information SBAR, Kardex, Procedure Summary, Intake/Output, MAR and Cardiac Rhythm SR.     1440: TRANSFER - OUT REPORT:    Verbal report given to Valente Ventura (name) on Sabi [place]  being transferred to [facility](unit) for routine progression of care       Report consisted of patients Situation, Background, Assessment and   Recommendations(SBAR). Information from the following report(s) SBAR, Kardex, Procedure Summary, Intake/Output, MAR and Cardiac Rhythm SR was reviewed with the receiving nurse.     Lines:   PICC Single Lumen 06/05/19 Right;Brachial (Active)   Central Line Being Utilized No 6/6/2019  1:45 PM   Criteria for Appropriate Use Long term IV/antibiotic administration 6/6/2019  1:45 PM   Site Assessment Clean, dry, & intact 6/6/2019  1:45 PM   Phlebitis Assessment 0 6/6/2019  1:45 PM   Infiltration Assessment 0 6/6/2019  1:45 PM   Arm Circumference (cm) 27 cm 6/6/2019  4:30 AM   Date of Last Dressing Change 06/05/19 6/6/2019  1:45 PM   Dressing Status Clean, dry, & intact 6/6/2019  9:00 AM   External Catheter Length (cm) 0 centimeters 6/5/2019  7:10 PM   Dressing Type Disk with Chlorhexadine gluconate (CHG) 6/6/2019  1:45 PM   Hub Color/Line Status Purple 6/6/2019  1:45 PM   Action Taken Other (comment) 6/5/2019 11:46 PM   Positive Blood Return (Site #1) Yes 6/5/2019 11:46 PM   Alcohol Cap Used Yes 6/6/2019  1:45 PM       Peripheral IV 06/01/19 Left Forearm (Active)   Site Assessment Clean, dry, & intact 6/6/2019  1:45 PM   Phlebitis Assessment 0 6/6/2019  1:45 PM   Infiltration Assessment 0 6/6/2019  1:45 PM   Dressing Status Clean, dry, & intact 6/6/2019  1:45 PM   Dressing Type Tape;Transparent 6/6/2019  1:45 PM   Hub Color/Line Status Flushed 6/6/2019  1:45 PM   Action Taken Open ports on tubing capped 6/6/2019  1:45 PM   Alcohol Cap Used Yes 6/6/2019  1:45 PM       Peripheral IV 06/02/19 Anterior;Left;Proximal Forearm (Active)   Site Assessment Clean, dry, & intact 6/6/2019  1:45 PM   Phlebitis Assessment 0 6/6/2019  1:45 PM   Infiltration Assessment 0 6/6/2019  1:45 PM   Dressing Status Clean, dry, & intact 6/6/2019  1:45 PM   Dressing Type Tape;Transparent 6/6/2019  1:45 PM   Hub Color/Line Status Flushed 6/6/2019  1:45 PM   Action Taken Open ports on tubing capped 6/6/2019  1:45 PM   Alcohol Cap Used Yes 6/6/2019  1:45 PM        Opportunity for questions and clarification was provided. Patient transported with:   Registered Nurse     : Miralax and Gabapentin given. Patient is ready for transfer. Ebenezer LEVY transferring patient over to [address] [place] [address],[address], room 3110.

## 2019-06-06 NOTE — PROGRESS NOTES
[address] June 6, 2019       RE: Yajaira Angela      To Whom It May Concern,    This is to certify that Yajaira Angela was admitted to our hospital from 5/29/2019 to 6/6/2019. Please feel free to contact my office if you have any questions or concerns. Thank you for your assistance in this matter.       Sincerely,  Kathryn Cooney MD

## 2019-06-06 NOTE — PROGRESS NOTES
Problem: Discharge Planning  Goal: *Discharge to safe environment  Outcome: Resolved/Met    SNF [facility]    Care Management Interventions  PCP Verified by CM: Yes(Dr. Gabriela Morejon, last seen few months ago. )  Palliative Care Criteria Met (RRAT>21 & CHF Dx)?: No  Mode of Transport at Discharge: Other (see comment)(family/friends)  Transition of Care Consult (CM Consult): SNF([facility])  Partner SNF: Yes  Discharge Durable Medical Equipment: No  Physical Therapy Consult: No  Occupational Therapy Consult: No  Speech Therapy Consult: No  Current Support Network: Lives Alone  Confirm Follow Up Transport: Self  Plan discussed with Pt/Family/Caregiver: Yes  Freedom of Choice Offered: Yes  Discharge Location  Discharge Placement: [id number] [facility] (SNF) Provider list has been given to the patient and/or patient representative. Patient and/or patient representative has signed the Okay of Choice selecting ________TCC_________________ as their preference facility and a copy given. Both SNF Provider list and Freedom of Choice have been placed on the chart.

## 2019-06-06 NOTE — PROGRESS NOTES
Infectious Disease Follow-up Note      Date of Admission: 5/29/2019     Date of Note:  6/6/2019    Summary:     40 y/o AAM w/ DM, HTN chronic R lateral plantar ulcer admitted w/new dorsolateral R foot ulcer and OM 5th MT on xray. Transient hypotension promptly responsive to fluids - not septic. s/p Resxn R 5th MTH, base prox phalanx. 2nd pass bx 5th MT, flap closure 6/2  cx NGTD. 2nd pass prox phalanx: chronic osteomyelitis    Interval History:     Sitting up in chair. Afebrile. No new complaints. Being transferred to [facility] today. Current Antimicrobials: Prior Antimicrobials   Ceftriaxone 6/4 - 2  Post amp 6/2 - 3  Vancom, zosyn 5/29 - 6       Assessment / Plan:      Osteomyelitis, right 5th metatarsal  - acute. Secondary to new dorsolateral ulcer  - xray 5/29: lytic changes distal 5th metatarsal  - MRI 5/31: OM distal 5th MT, base 5th prox phalanx, septic arthritis 5th MTP  - s/p Resxn R 5th MTH, base prox phalanx. 2nd pass bx 5th MT, flap closure 6/2  cx NGTD  - 2nd pass prox phalanx: chronic osteomyelitis  - being transferred to [facility]. -> continue Ceftriaxone 2 gm IV q 24 x 6 weeks (end date: 7/14/2019)  -> will follow up in [facility] once weekly   Positive blood culture GC Strep  - 1 of 2 blcx 5/29 Group C Streptococcus  - unclear significance (contaminant vs from OM wound infection)   - covered by current abx -> monitor   Transient Hypotension  - promptly resolved with IVF. Likely dehydration. Not septic - appearing.  Now hypertensive -> monitor   Chronic non-healing ulcer, lateral plantar, R foot     DM     HTN        Microbiology:   5/29      blcx IP x2     Lines / Catheters:   R PICC    Patient Active Problem List   Diagnosis Code    Right foot ulcer, with necrosis of muscle ([address] L97.513    Diabetic polyneuropathy ([address] E11.42    Right foot ulcer, with fat layer exposed ([address] L97.512    Osteomyelitis of right foot ([address] M86.9    Sepsis ([address] A41.9    Left arm weakness R29.898       Current Facility-Administered Medications   Medication Dose Route Frequency    insulin glargine (LANTUS) injection 10 Units  10 Units SubCUTAneous BID    insulin lispro (HUMALOG) injection   SubCUTAneous QID AFTER MEALS    insulin lispro (HUMALOG) injection 4 Units  4 Units SubCUTAneous TIDPC    bacitracin 500 unit/gram packet 1 Packet  1 Packet Topical PRN    sodium chloride (NS) flush 10-30 mL  10-30 mL InterCATHeter PRN    sodium chloride (NS) flush 10 mL  10 mL InterCATHeter Q24H    sodium chloride (NS) flush 10 mL  10 mL InterCATHeter PRN    sodium chloride (NS) flush 10-40 mL  10-40 mL InterCATHeter Q8H    sodium chloride (NS) flush 20 mL  20 mL InterCATHeter Q24H    cefTRIAXone (ROCEPHIN) 2 g in 0.9% sodium chloride (MBP/ADV) 50 mL MBP  2 g IntraVENous Q24H    acetaminophen (TYLENOL) tablet 650 mg  650 mg Oral Q6H PRN    melatonin tablet 3 mg  3 mg Oral QHS PRN    dextrose 10 % infusion 125-250 mL  125-250 mL IntraVENous PRN    sodium chloride (NS) flush 5-10 mL  5-10 mL IntraVENous PRN    aspirin chewable tablet 81 mg  81 mg Oral DAILY    calcium-vitamin D 600 mg(1,500mg) -200 unit per tablet 1 Tab  1 Tab Oral DAILY    gabapentin (NEURONTIN) capsule 300 mg  300 mg Oral TID    simvastatin (ZOCOR) tablet 40 mg  40 mg Oral QHS    0.45% sodium chloride infusion  75 mL/hr IntraVENous CONTINUOUS    heparin (porcine) injection 5,000 Units  5,000 Units SubCUTAneous Q8H    glucose chewable tablet 16 g  4 Tab Oral PRN    glucagon (GLUCAGEN) injection 1 mg  1 mg IntraMUSCular PRN       Objective:     Visit Vitals  BP 91/60 (BP 1 Location: Left arm, BP Patient Position: At rest)   Pulse 97   Temp 98.3 °F (36.8 °C)   Resp 19   Ht 5' 6\" (1.676 m)   Wt 65.9 kg (145 lb 4.8 oz)   SpO2 95%   BMI 23.45 kg/m²       Temp (24hrs), Av °F (36.7 °C), Min:97.5 °F (36.4 °C), Max:98.3 °F (36.8 °C)      General: Well developed, well nourished 39 y.o.  male in no acute distress.   ENT: ENT exam normal, no neck nodes or sinus tenderness  Head: normocephalic, without obvious abnormality  Mouth:  mucous membranes moist, pharynx normal without lesions  Neck: supple, symmetrical, trachea midline   Cardio:  regular rate and rhythm, S1, S2 normal, no murmur, click, rub or gallop  Lungs: clear to auscultation, no wheezes or rales and unlabored breathing  Abdomen: soft, non-tender. Bowel sounds normal. No masses, no organomegaly. Extremities:  no redness or tenderness in the calves or thighs, Left foot dressing intact, drain still in place. R PICC in place      Lab results     Chemistry  No results for input(s): GLU, NA, K, CL, CO2, BUN, CREA, CA, AGAP, BUCR, TBIL, GPT, AP, TP, ALB, GLOB, AGRAT in the last 72 hours. CBC w/ Diff  No results for input(s): WBC, RBC, HGB, HCT, PLT, GRANS, LYMPH, EOS, HGBEXT, HCTEXT, PLTEXT, HGBEXT, HCTEXT, PLTEXT in the last 72 hours.     Microbiology  All Micro Results     Procedure Component Value Units Date/Time    CULTURE, TISSUE Elease Hussar STAIN [[id number]] Collected:  06/02/19 1206    Order Status:  Completed Specimen:  Foot Updated:  06/06/19 0748     Special Requests: BONE AND TISSUE RIGHT FOOT 5TH METATARSAL HEAD     GRAM STAIN RARE WBC'S         NO ORGANISMS SEEN        Culture result: NO GROWTH 4 DAYS       CULTURE, ANAEROBIC [[id number]] Collected:  06/02/19 1206    Order Status:  Completed Specimen:  Foot Updated:  06/06/19 0702     Special Requests: BONE AND TISSUE RIGHT FOOT 5TH METATARSAL HEAD     Culture result:       NO ANAEROBES ISOLATED 4 DAYS          CULTURE, BLOOD [[id number]] Collected:  05/29/19 1225    Order Status:  Completed Specimen:  Blood Updated:  06/04/19 0856     Special Requests: PERIPHERAL        Culture result: NO GROWTH 6 DAYS       CULTURE, ANAEROBIC [[id number]] Collected:  06/02/19 1045    Order Status:  Canceled Specimen:  Foot, Right     CULTURE, ANAEROBIC [[id number]] Collected:  06/02/19 1045    Order Status:  Canceled Specimen:  Foot, Right     CULTURE, BLOOD [[id number]]  (Abnormal) Collected:  05/29/19 1215    Order Status:  Completed Specimen:  Blood Updated:  05/31/19 0732     Special Requests: PERIPHERAL        GRAM STAIN       ANAEROBIC BOTTLE GRAM POSITIVE COCCI IN CHAINS IN PAIRS                  SMEAR CALLED TO AND CORRECTLY REPEATED BY: Shiv Tipton RN,3000, ON 5/30/19 AT 0329 TO [facility]           Culture result:       ANAEROBIC BOTTLE STREPTOCOCCI, BETA HEMOLYTIC GROUP C                 Ling Tabor MD, [facility]  Infectious Disease Specialist  Pager [phone]

## 2019-06-06 NOTE — DISCHARGE SUMMARY
Discharge Summary    Patient: Desiree Cm               Sex: male          DOA: 5/29/2019       YOB: 1975      Age:  39 y.o.        LOS:  LOS: 8 days                Admit Date: 5/29/2019    Discharge Date: 6/6/2019    Admission Diagnoses: Sepsis [facility]) [A41.9]    Discharge Diagnoses:    Hospital Problems  Date Reviewed: 6/2/2019          Codes Class Noted POA    Osteomyelitis of right foot ([address] ICD-10-CM: M86.9  ICD-9-CM: 730.27  5/29/2019 Unknown        * (Principal) Sepsis ([address] ICD-10-CM: A41.9  ICD-9-CM: 038.9, 995.91  5/29/2019 Unknown        Left arm weakness ICD-10-CM: R29.898  ICD-9-CM: 729.89  5/29/2019 Unknown        Right foot ulcer, with necrosis of muscle ([facility] ICD-10-CM: L97.513  ICD-9-CM: 707.15  10/18/2018 Yes              Discharge Disposition: Rehab Facility     Discharge Condition:  Improved    Hospital Course:  38 yo M w/ h/o DM2, HTN, chronic R lateral plantar ulcer who was admitted with new dorsolateral R foot ulcer and osteomyelitis on XR. MRI confirmed osteomyelitis. Podiatry and ID were consulted. Initially started on broad spectrum atbx. Podiatry performed resection 5th metatarsal head along with base of prox phalanx, second pass bone biopsies of the 5th metatarsal and proximal phalanx, and flap closure on 6/2. He has a drain in place. Second pass prox phalanx bone biopsy findings were positive for chronic osteomyelitis. ID recommended 6 weeks of IV rocephin 2gm q24h. PICC line was placed. He was stable for discharge and accepted to [facility] to resume care. Of note, he did have one positive blood cx with Group C Streptococcus on 5/29, per ID this is of unclear significance and could be contaminant vs from OM wound infection, in either case it is covered by current atbx.      FOLLOW UP  - Pt needs to f/u with Podiatry Dr. Hernan Stone 48-72h after discharge      Consults:    Infectious Disease and Podiatry    Labs:  Labs: Results:       Chemistry No results for input(s): GLU, NA, K, CL, CO2, BUN, CREA, CA, AGAP, BUCR, TBIL, GPT, AP, TP, ALB, GLOB, AGRAT in the last 72 hours. CBC w/Diff No results for input(s): WBC, RBC, HGB, HCT, PLT, GRANS, LYMPH, EOS, HGBEXT, HCTEXT, PLTEXT in the last 72 hours. Cardiac Enzymes No results for input(s): CPK, CKND1, BROOK in the last 72 hours. No lab exists for component: CKRMB, TROIP   Coagulation No results for input(s): PTP, INR, APTT in the last 72 hours. No lab exists for component: INREXT    Lipid Panel No results found for: CHOL, CHOLPOCT, CHOLX, CHLST, CHOLV, 934382, HDL, LDL, LDLC, DLDLP, 481371, VLDLC, VLDL, TGLX, TRIGL, TRIGP, TGLPOCT, CHHD, CHHDX   BNP No results for input(s): BNPP in the last 72 hours. Liver Enzymes No results for input(s): TP, ALB, TBIL, AP, SGOT, GPT in the last 72 hours. No lab exists for component: DBIL   Thyroid Studies No results found for: T4, T3U, TSH, TSHEXT         Significant Diagnostic Studies:   Xr Foot Rt Min 3 V    Result Date: 5/29/2019  EXAM:  XR FOOT RT MIN 3 V INDICATION:   Right foot pain, diabetic ulcer COMPARISON:  None. FINDINGS:  3 views of the right foot demonstrate focal soft tissue ulceration noted adjacent to the fifth MTP. Adjacent cortical discontinuity and lytic change noted of the distal fifth metatarsal head. No other acute osseous abnormality identified. Chronic irregularity noted of the third distal phalanx. IMPRESSION: Focal lytic changes noted of the fifth distal metatarsal compatible with osteomyelitis. Overlying soft tissue ulceration noted. Mri Foot Rt Wo Cont    Result Date: 6/1/2019  EXAM: MRI FOOT RT WO CONT CLINICAL INDICATION/HISTORY:  Osteomyelitis suspected, foot swelling, yes arthropathy, no ulcer, diabetic pt   > Additional: Lateral forefoot ulcer with distraction of the fifth metatarsal head and subluxation at the MTP joint by plain radiographs. Evaluate osteomyelitis.  COMPARISON: None   > Correlative Imaging: Radiographs from May 29 TECHNIQUE: T1 all 3 planes, STIR sagittal and coronal, fat suppressed T2 axial. _______________ FINDINGS: FIRST RAY: Unremarkable except for mild degenerative changes at the MTP joint, with marginal osteophytes but no joint effusion. Little or no significant metatarsal sesamoid DJD. SECOND RAY: Unremarkable except for slight degenerative changes over the dorsum of the tarsometatarsal joint. THIRD RAY: Degenerative change over the dorsum of the tarsometatarsal joint, with moderate size subchondral cyst in the distal pole third cuneiform, with surrounding bone marrow edema. Remainder unremarkable. FOURTH RAY: Unremarkable. FIFTH RAY: Consistent with radiographic findings, there is destruction of the head of the fifth metatarsal. There is bone marrow edema throughout the metatarsal, sparing only a small portion of the base. Also consistent with radiographic findings, the fifth toe is medially subluxed relative to the metatarsal. This is due to disruption of the lateral joint capsule. There is erosion of the base of the proximal phalanx, with edema reaching the head of the phalanx. Middle and distal phalanges are unremarkable. SOFT TISSUES:   > Located over the lateral plantar forefoot at the level of MTP 5, there is an ulcerated lesion, deep to which there is edema reaching the MTP joint.   > Second edematous and possibly ulcerated area seen along the lateral forefoot roughly 2 cm proximal to this.   > Nonspecific interosseous edema between metatarsals 2 and 3 and also between metatarsals 3 and 4. No organized fluid collection. > The tendon of the abductor digiti minimi is either partially or completely torn. INCIDENTAL FINDINGS: None. _______________     IMPRESSION: 1. Lateral forefoot ulcer with findings strongly suggestive of osteomyelitis involving the distal fifth metatarsal along with the base of the fifth proximal phalanx, with septic arthritis of MTP 5. Lateral joint capsule disrupted, with medial subluxation of the fifth toe.  Tendon of the abductor digiti minimi is either partially or completely torn. 2. Additional area roughly 2 cm proximal to the ulcer along the lateral forefoot which is edematous and could be ulcerated but much more shallow. 3. Nonspecific second and third interspace intramuscular edema. 4. Degenerative changes over the dorsal aspect of TMT 2 and TMT 3. Xr Chest Port    Result Date: 5/29/2019  EXAM: One-view chest CLINICAL HISTORY: meets SIRS criteria , COMPARISON: None FINDINGS: Frontal view of the chest demonstrate clear lungs. Cardiac silhouette is normal in size and contour. No acute bony or soft tissue abnormality. IMPRESSION: No acute pulmonary process identified. Discharge Medications:     Current Discharge Medication List      START taking these medications    Details   cefTRIAXone 2 gram 2 g IVPB 2 g by IntraVENous route every twenty-four (24) hours for 28 days. Qty: 28 Dose, Refills: 0    Associated Diagnoses: Osteomyelitis of right foot, unspecified type (HCC)      insulin lispro (HUMALOG) 100 unit/mL injection 4 Units by SubCUTAneous route three (3) times daily (after meals). Indications: type 2 diabetes mellitus  Qty: 1 Vial, Refills: 0         CONTINUE these medications which have CHANGED    Details   insulin glargine (LANTUS,BASAGLAR) 100 unit/mL (3 mL) inpn 10 Units by SubCUTAneous route two (2) times a day. Qty: 1 Adjustable Dose Pre-filled Pen Syringe, Refills: 0         CONTINUE these medications which have NOT CHANGED    Details   calcium-cholecalciferol, d3, (CALCIUM 600 + D) 600-125 mg-unit tab Take  by mouth.      gabapentin (NEURONTIN) 300 mg capsule Take 300 mg by mouth three (3) times daily. simvastatin (ZOCOR) 20 mg tablet Take 40 mg by mouth nightly. insulin aspart (NOVOLOG) 100 unit/mL injection by SubCUTAneous route. Indications: TYPE 2 DIABETES MELLITUS      aspirin 81 mg chewable tablet Take 81 mg by mouth daily.  Indications: MYOCARDIAL INFARCTION PREVENTION Activity: NWB ambulation to right foot    Diet: Diabetic Diet    Wound Care: Local wound care with betadine to right foot. Follow-up: PCP in 1 week. Podiatry in 2-3 days (Dr. Bernabe Brennan).          Total time spent including time spent on final examination and discharge discussion, discharge documentation and records reviewed and medication reconciliation: > 30 minutes    Brian Johnson MD  [facility]

## 2019-06-06 NOTE — DISCHARGE INSTRUCTIONS
DISCHARGE SUMMARY from Nurse    PATIENT INSTRUCTIONS:    After general anesthesia or intravenous sedation, for 24 hours or while taking prescription Narcotics:  · Limit your activities  · Do not drive and operate hazardous machinery  · Do not make important personal or business decisions  · Do  not drink alcoholic beverages  · If you have not urinated within 8 hours after discharge, please contact your surgeon on call. Report the following to your surgeon:  · Excessive pain, swelling, redness or odor of or around the surgical area  · Temperature over 100.5  · Nausea and vomiting lasting longer than 4 hours or if unable to take medications  · Any signs of decreased circulation or nerve impairment to extremity: change in color, persistent  numbness, tingling, coldness or increase pain  · Any questions    What to do at Home:  Recommended activity: Activity as tolerated and PT/OT Eval and Treat,    If you experience any of the following symptoms uncontrolled pain, fever, or chills, please follow up with [facility] nursing staff. *  Please give a list of your current medications to your Primary Care Provider. *  Please update this list whenever your medications are discontinued, doses are      changed, or new medications (including over-the-counter products) are added. *  Please carry medication information at all times in case of emergency situations. These are general instructions for a healthy lifestyle:    No smoking/ No tobacco products/ Avoid exposure to second hand smoke  Surgeon General's Warning:  Quitting smoking now greatly reduces serious risk to your health.     Obesity, smoking, and sedentary lifestyle greatly increases your risk for illness    A healthy diet, regular physical exercise & weight monitoring are important for maintaining a healthy lifestyle    You may be retaining fluid if you have a history of heart failure or if you experience any of the following symptoms:  Weight gain of 3 pounds or more overnight or 5 pounds in a week, increased swelling in our hands or feet or shortness of breath while lying flat in bed. Please call your doctor as soon as you notice any of these symptoms; do not wait until your next office visit. Recognize signs and symptoms of STROKE:    F-face looks uneven    A-arms unable to move or move unevenly    S-speech slurred or non-existent    T-time-call 911 as soon as signs and symptoms begin-DO NOT go       Back to bed or wait to see if you get better-TIME IS BRAIN. Warning Signs of HEART ATTACK     Call 911 if you have these symptoms:   Chest discomfort. Most heart attacks involve discomfort in the center of the chest that lasts more than a few minutes, or that goes away and comes back. It can feel like uncomfortable pressure, squeezing, fullness, or pain.  Discomfort in other areas of the upper body. Symptoms can include pain or discomfort in one or both arms, the back, neck, jaw, or stomach.  Shortness of breath with or without chest discomfort.  Other signs may include breaking out in a cold sweat, nausea, or lightheadedness. Don't wait more than five minutes to call 911 - MINUTES MATTER! Fast action can save your life. Calling 911 is almost always the fastest way to get lifesaving treatment. Emergency Medical Services staff can begin treatment when they arrive -- up to an hour sooner than if someone gets to the hospital by car. The discharge information has been reviewed with the patient and [facility] nursing staff. The patient and [facility] nursing staff verbalized understanding. Discharge medications reviewed with the patient and [facility] nursing staff and appropriate educational materials and side effects teaching were provided. Patient discharged without removing armband and transfered to another healthcare acute, sub acute , or extended care facility.   Informed of privacy risks if armband lost or stolen

## 2019-06-06 NOTE — LETTER
6/7/2019 Mr. Kindra Chow 
[address] [place] [address] [phone] To Whom It May Concern: 
 
Kindra Chow was admitted to [facility]ed Care unit for rehab on 6/6/2019. Patient previous hospitalization from  5/29-6/6/2019. Date of discharge is unknown due to recent admission. If there are questions or concerns please have the patient contact our office. Sincerely, Abiodun Pass FNP-C 
[phone] [address] [place] [address] No name on file.

## 2019-06-07 LAB
BACTERIA SPEC CULT: NORMAL
BACTERIA SPEC CULT: NORMAL
GLUCOSE BLD STRIP.AUTO-MCNC: 141 MG/DL (ref 70–110)
GLUCOSE BLD STRIP.AUTO-MCNC: 249 MG/DL (ref 70–110)
GLUCOSE BLD STRIP.AUTO-MCNC: 325 MG/DL (ref 70–110)
GLUCOSE BLD STRIP.AUTO-MCNC: 98 MG/DL (ref 70–110)
GRAM STN SPEC: NORMAL
GRAM STN SPEC: NORMAL
SERVICE CMNT-IMP: NORMAL
SERVICE CMNT-IMP: NORMAL

## 2019-06-07 PROCEDURE — 74011250637 HC RX REV CODE- 250/637: Performed by: INTERNAL MEDICINE

## 2019-06-07 PROCEDURE — 74011000258 HC RX REV CODE- 258: Performed by: INTERNAL MEDICINE

## 2019-06-07 PROCEDURE — 74011636637 HC RX REV CODE- 636/637: Performed by: NURSE PRACTITIONER

## 2019-06-07 PROCEDURE — 74011250636 HC RX REV CODE- 250/636: Performed by: INTERNAL MEDICINE

## 2019-06-07 PROCEDURE — 74011636637 HC RX REV CODE- 636/637: Performed by: INTERNAL MEDICINE

## 2019-06-07 PROCEDURE — 82962 GLUCOSE BLOOD TEST: CPT

## 2019-06-07 RX ORDER — FACIAL-BODY WIPES
10 EACH TOPICAL DAILY PRN
Status: DISCONTINUED | OUTPATIENT
Start: 2019-06-07 | End: 2019-06-28 | Stop reason: HOSPADM

## 2019-06-07 RX ORDER — ADHESIVE BANDAGE
30 BANDAGE TOPICAL DAILY PRN
Status: DISCONTINUED | OUTPATIENT
Start: 2019-06-07 | End: 2019-06-28 | Stop reason: HOSPADM

## 2019-06-07 RX ORDER — INSULIN GLARGINE 100 [IU]/ML
15 INJECTION, SOLUTION SUBCUTANEOUS 2 TIMES DAILY
Status: DISCONTINUED | OUTPATIENT
Start: 2019-06-07 | End: 2019-06-12

## 2019-06-07 RX ADMIN — MAGNESIUM HYDROXIDE 30 ML: 400 SUSPENSION ORAL at 05:42

## 2019-06-07 RX ADMIN — Medication 1 TABLET: at 11:13

## 2019-06-07 RX ADMIN — INSULIN LISPRO 4 UNITS: 100 INJECTION, SOLUTION INTRAVENOUS; SUBCUTANEOUS at 13:06

## 2019-06-07 RX ADMIN — ASPIRIN 81 MG 81 MG: 81 TABLET ORAL at 11:13

## 2019-06-07 RX ADMIN — Medication 1 TABLET: at 16:37

## 2019-06-07 RX ADMIN — SIMVASTATIN 40 MG: 40 TABLET, FILM COATED ORAL at 22:35

## 2019-06-07 RX ADMIN — CEFTRIAXONE SODIUM 2 G: 2 INJECTION, POWDER, FOR SOLUTION INTRAMUSCULAR; INTRAVENOUS at 18:38

## 2019-06-07 RX ADMIN — GABAPENTIN 300 MG: 300 CAPSULE ORAL at 22:00

## 2019-06-07 RX ADMIN — GABAPENTIN 300 MG: 300 CAPSULE ORAL at 11:13

## 2019-06-07 RX ADMIN — INSULIN LISPRO 4 UNITS: 100 INJECTION, SOLUTION INTRAVENOUS; SUBCUTANEOUS at 16:37

## 2019-06-07 RX ADMIN — INSULIN GLARGINE 15 UNITS: 100 INJECTION, SOLUTION SUBCUTANEOUS at 22:36

## 2019-06-07 RX ADMIN — INSULIN LISPRO 10 UNITS: 100 INJECTION, SOLUTION INTRAVENOUS; SUBCUTANEOUS at 16:38

## 2019-06-07 RX ADMIN — GABAPENTIN 300 MG: 300 CAPSULE ORAL at 16:37

## 2019-06-07 RX ADMIN — INSULIN LISPRO 12 UNITS: 100 INJECTION, SOLUTION INTRAVENOUS; SUBCUTANEOUS at 13:04

## 2019-06-07 RX ADMIN — INSULIN GLARGINE 10 UNITS: 100 INJECTION, SOLUTION SUBCUTANEOUS at 11:14

## 2019-06-07 RX ADMIN — INSULIN LISPRO 4 UNITS: 100 INJECTION, SOLUTION INTRAVENOUS; SUBCUTANEOUS at 11:14

## 2019-06-07 NOTE — ROUTINE PROCESS
Skin Assessment: Patient has wound on right foot with dressing being changed by MD, drain is intact with pink to red color drainage present. Old left knee scar present, PICC line in right upper arm single lumen purple cap. Weight 143lbs standing.

## 2019-06-07 NOTE — PROGRESS NOTES
Nutrition initial assessment/Plan of care [facility]      RECOMMENDATIONS:   1. Consistent CHO Diet with HS snack  2. Monitor labs, weight and PO intake  3. RD to follow     GOALS:   1. PO intake meets >75% of protein/calorie needs by 6/14  2. Weight Maintenance (+/- 1-2 lb) by 6/14      ASSESSMENT:   Wt status is classified as normal per Body mass index is 23.08 kg/m². Adequate PO intake per vital in hospital. Labs noted. BG range () over the past 24 hours; A1c (8.5). Pt w/ hypocalcemia. Glycemic control team following. Nutrition recommendations listed. RD to follow. Nutrition Diagnoses: Altered nutrition related lab values related to diabetes as evidenced by an  A1c (8.5%). Nutrition Risk:  [] High  [] Moderate [x]  Low    SUBJECTIVE/OBJECTIVE:   (6/7): Pt transferred from  to [facility] on 6/6/2019 after being admitted for sepsis and a right foot ulcer. Pt seen in room OOB in chair later in the day. Reports he has a good appetite and doing well. Provided a menu and encouraged to implement preferences with dietary. Also re ordered HS snack. Will continue to monitor. Below per previous RD note:   (6/4): Admitted with right foot ulcer. Has history of diabetes. S/P right 5th metatarsal head and base of phalanx resection on (6/2). Patient reports having a good appetite and is eating all of meals. 100% intake of lunch meal today and 100% of all meals per vitals. Noted [id number] Kcal diet ordered. Will adjust to 2000 Kcal to meet nutrition needs. States weight has been stable PTA and UBW is between 145-150 lb. No complaints.  Will monitor.      Information Obtained from:    [x] Chart Review   [x] Patient   [] Family/Caregiver   [] Nurse/Physician   [] Interdisciplinary Meeting/Rounds      Diet: Consistent CHO  Medications: [x] Reviewed    Allergies: [x] Reviewed   Patient Active Problem List   Diagnosis Code    Right foot ulcer, with necrosis of muscle ([address] L97.513    Diabetic polyneuropathy ([address] E11.42  Right foot ulcer, with fat layer exposed ([facility] L97.512    Osteomyelitis of right foot ([facility] M86.9    Sepsis ([facility] A41.9    Left arm weakness R29.898       Past Medical History:   Diagnosis Date    Diabetes ([facility]       Labs:    Lab Results   Component Value Date/Time    Sodium 139 06/03/2019 04:38 AM    Potassium 4.0 06/03/2019 04:38 AM    Chloride 102 06/03/2019 04:38 AM    CO2 30 06/03/2019 04:38 AM    Anion gap 7 06/03/2019 04:38 AM    Glucose 167 (H) 06/03/2019 04:38 AM    BUN 16 06/03/2019 04:38 AM    Creatinine 1.17 06/03/2019 04:38 AM    Calcium 8.2 (L) 06/03/2019 04:38 AM    Magnesium 2.3 06/03/2019 04:38 AM    Phosphorus 4.0 06/03/2019 04:38 AM    Albumin 3.0 (L) 05/30/2019 05:30 AM     Anthropometrics: BMI (calculated): 23.1  Last 3 Recorded Weights in this Encounter    06/06/19 2318   Weight: 64.9 kg (143 lb)      Ht Readings from Last 1 Encounters:   05/29/19 5' 6\" (1.676 m)     Weight Metrics 6/6/2019 6/3/2019 2/22/2013   Weight 143 lb 145 lb 4.8 oz 150 lb   BMI 23.08 kg/m2 23.45 kg/m2 23.49 kg/m2       No data found. IBW: 142 lb %IBW: 101% UBW: 145-150 lb   [] Weight Loss [] Weight Gain [x] Weight Stable    Estimated Nutrition Needs: [x] MSJ  [] Other:  Calories: 1928 kcal Based on:   [x] Actual BW    Protein:   78 g Based on:   [x] Actual BW    Fluid:       2000 ml Based on:   [x] Actual BW      [x] No Cultural, Orthodoxy or ethnic dietary need identified.     [] Cultural, Orthodoxy and ethnic food preferences identified and addressed     Wt Status:  [x] Normal (18.6 - 24.9) [] Underweight (<18.5) [] Overweight (25 - 29.9) [] Mild Obesity (30 - 34.9)  [] Moderate Obesity (35 - 39.9) [] Morbid Obesity (40+)       Nutrition Problems Identified:   [] Suboptimal PO intake   [] Food Allergies  [] Difficulty chewing/swallowing/poor dentition  [x] Constipation/Diarrhea (Last BM on 6/1; bowel regimen in place)   [] Nausea/Vomiting   [] None  [x] Other: Wound healing    Plan:   [x] Therapeutic Diet  [x]  Obtained/adjusted food preferences/tolerances and/or snacks options   []  Supplements added   [] Occupational therapy following for feeding techniques  [x]  HS snack added   []  Modify diet texture   []  Modify diet for food allergies   [x]  Educate patient (completed by glycemic control)   []  Assist with menu selection   [x]  Monitor PO intake on meal rounds   [x]  Continue inpatient monitoring and intervention   [x]  Participated in discharge planning/Interdisciplinary rounds/Team meetings   []  Other:     Education Needs:   [] Not appropriate for teaching at this time due to:   [x] Identified and addressed    Nutrition Monitoring and Evaluation:  [x] Continue ongoing monitoring and intervention  [] Alexis Ray

## 2019-06-07 NOTE — PROGRESS NOTES
[facility]    Daily progress Note    Patient: Yogi Hernandez MRN: [id number]  CSN: [id number]    YOB: 1975  Age: 39 y.o. Sex: male    DOA: 6/6/2019 LOS:  LOS: 1 day                    Subjective:     CC: follow right foot OM    Mr. Nathaly Mello is a 39 yr old  Male patient. Patient recently hospitalized from 5/29-6/6. Patient was seen in the Er for eval of chronic right lateral plantar ulcer. Patient was found to have new dorsolateral right foot OM on XR. MRI then confirm OM. He was seen by podiatry and they had   resection 5th metatarsal head along with base of prox phalanx, second pass bone biopsies of the 5th metatarsal and proximal phalanx, and flap closure on 6/2. Second pass prox phalanx bone biopsy findings were positive for chronic osteomyelitis. He was seen by ID and they recommended 6 weeks of IV Rocephin vis PICC line. His BC did come back + for group C Streptococcus on 5/29, per ID possibel contaminant vs from OM wound infection,, continue ivabx. Patient improved and sent to [facility] for rehab. Upon assessment, he is alert and verbal. He denies any NVD, fever or chills. He report that he is constipated, passing gas. Report appetite is good.        PAST MEDICAL HISTORY: Insulin-dependent diabetes, hypertension.     PAST SURGICAL HISTORY:  Surgery on the left leg for fracture.     ALLERGIES:  TO IV CONTRAST.     CURRENT MEDICATIONS:  Medication list reviewed.     SOCIAL HISTORY: Wali Lagos admits to smoking and drinking occasionally.     FAMILY HISTORY:  Positive for diabetes.     REVIEW OF SYSTEMS:  No fever or chills.  No weight loss or headache.  No chest pain or palpitation.  No shortness of breath or cough.  No nausea, vomiting, diarrhea.  No dysuria or polyuria.  No back pain or leg pain.  No heat or cold intolerance.  No anxiety or depression    Objective:      Visit Vitals  BP (!) 79/52   Pulse (!) 109   Temp 97.1 °F (36.2 °C)   Resp 17   Wt 64.9 kg (143 lb)   SpO2 95%   BMI 23.08 kg/m² Physical Exam:  General appearance: alert, cooperative, no distress, appears stated age  [de-identified]: negative  Neck: supple, symmetrical, trachea midline, no adenopathy, thyroid: not enlarged, symmetric, no tenderness/mass/nodules, no carotid bruit and no JVD  Lungs: clear to auscultation bilaterally  Heart: regular rate and rhythm, S1, S2 normal, no murmur, click, rub or gallop  Abdomen: soft, non-tender. Bowel sounds normal. No masses,  no organomegaly  Pulses: 2+ and symmetric  Skin: right foot ace wrap in place      Intake and Output:  Current Shift:  No intake/output data recorded. Last three shifts:  No intake/output data recorded.     Recent Results (from the past 24 hour(s))   GLUCOSE, POC    Collection Time: 06/06/19  6:31 PM   Result Value Ref Range    Glucose (POC) 146 (H) 70 - 110 mg/dL   GLUCOSE, POC    Collection Time: 06/06/19 10:17 PM   Result Value Ref Range    Glucose (POC) 72 70 - 110 mg/dL   GLUCOSE, POC    Collection Time: 06/07/19  5:40 AM   Result Value Ref Range    Glucose (POC) 141 (H) 70 - 110 mg/dL   GLUCOSE, POC    Collection Time: 06/07/19 12:10 PM   Result Value Ref Range    Glucose (POC) 325 (H) 70 - 110 mg/dL         Current Facility-Administered Medications:     magnesium hydroxide (MILK OF MAGNESIA) 400 mg/5 mL oral suspension 30 mL, 30 mL, Oral, DAILY PRN, Ana Laura Rascon MD, 30 mL at 06/07/19 0542    bisacodyl (DULCOLAX) suppository 10 mg, 10 mg, Rectal, DAILY PRN, Yissel Rubio MD    cefTRIAXone (ROCEPHIN) 2 g in 0.9% sodium chloride (MBP/ADV) 50 mL MBP, 2 g, IntraVENous, Q24H, Ana Laura Rascon MD, Last Rate: 100 mL/hr at 06/06/19 1915, 2 g at 06/06/19 1915    insulin lispro (HUMALOG) injection 4 Units, 4 Units, SubCUTAneous, TIDAC, Ana Laura Rascon MD, 4 Units at 06/07/19 1306    insulin glargine (LANTUS) injection 10 Units, 10 Units, SubCUTAneous, BID, Yissel Rubio MD, 10 Units at 06/07/19 1114    calcium-vitamin D 600 mg(1,500mg) -200 unit per tablet 1 Tab, 1 Tab, Oral, BID WITH MEALS, Veronica Osullivan MD, 1 Tab at 06/07/19 1113    gabapentin (NEURONTIN) capsule 300 mg, 300 mg, Oral, TID, Veronica Osullivan MD, 300 mg at 06/07/19 1113    simvastatin (ZOCOR) tablet 40 mg, 40 mg, Oral, QHS, Veronica Osullivan MD, 40 mg at 06/06/19 2213    aspirin chewable tablet 81 mg, 81 mg, Oral, DAILY, Ana Laura Rascon MD, 81 mg at 06/07/19 1113    insulin lispro (HUMALOG) injection, , SubCUTAneous, AC&HS, Veronica Osullivan MD, 12 Units at 06/07/19 1304    glucagon (GLUCAGEN) injection 1 mg, 1 mg, IntraMUSCular, PRN, Veronica Osullivan MD    glucose chewable tablet 16 g, 4 Tab, Oral, PRN, Veronica Osullivan MD    dextrose 10 % infusion 125-250 mL, 125-250 mL, IntraVENous, PRN, Veronica Osullivan MD    Lab Results   Component Value Date/Time    Glucose 167 (H) 06/03/2019 04:38 AM    Glucose 188 (H) 06/02/2019 04:30 AM    Glucose 224 (H) 06/01/2019 04:20 AM    Glucose 257 (H) 05/31/2019 04:45 AM    Glucose 29 (LL) 05/30/2019 05:30 AM        Assessment/Plan   New dorsolateral right foot OM:  patient is s/p resection 5th metatarsal head along with base of prox phalanx, second pass bone biopsies of the 5th metatarsal and proximal phalanx, and flap closure on 6/2. Patient to follow up with [facility]. Continue Ivbx per ID recommendation or 6 weeks via PICC line. Continue wound care    DM type 2: will closely monitor his BS,  Will continue Lantus 10 units bid + Lispro 4 units 3 tid + SSI. Will adjust his meds accordingly      Hyperlipidemia: continue Zocor    Left arm weakness: noted from previous neck injury.      Constipation : will initiate bowel regiment and monitor    Continue pt/ot   Jayda Burgess NP  6/7/2019, 4:08 PM

## 2019-06-07 NOTE — ROUTINE PROCESS
Bedside, Verbal and Written shift change report given to desiree hurt (oncoming nurse) by Rao Clifton (offgoing nurse). Report included the following information SBAR, Intake/Output and MAR.

## 2019-06-07 NOTE — PROGRESS NOTES
Met with Mr. Aye Sandoval at bedside, introduced myself and my roles. Provided him with the activity calender and scheduled Initial care plan meeting for Monday at 1:30pm in his room. Left my business card and discussed the activities available in the recreation room. Additionally explained on Monday we will review activities he may be interested in to ensure his needs are met. He reported he is pretty easy going and he will check the available actiities closet and book shelf and we can discuss further on Monday. Will continue to follow.

## 2019-06-07 NOTE — PROGRESS NOTES
Podiatry Surgery Progress Note      Patient: Esther Morales MRN: [id number]  SSN: [id number]    YOB: 1975  Age: 39 y.o. Sex: male      Assessment:     Patient Active Problem List   Diagnosis Code    Right foot ulcer, with necrosis of muscle ([address] L97.513    Diabetic polyneuropathy ([address] E11.42    Right foot ulcer, with fat layer exposed ([address] L97.512    Osteomyelitis of right foot ([address] M86.9    Sepsis ([address] A41.9    Left arm weakness R29.898          Pulled TLS drain today right foot. Applied fresh dressing with betadine DSD right foot. Continue LWC and IV antibx    Total time spent with patient: 30 [address] discussed with: Patient    Discussed:  Care Plan    Disposition:  Stable      Mr. Brie Sultana is a 39 y.o. male who was seen in [facility] for dressing change right foot. Patient to receive IV antibx. Per ID  No new complaints today     Subjective:   Past Medical History  Past Medical History:   Diagnosis Date    Diabetes ([address]      Social History     Socioeconomic History    Marital status: SINGLE     Spouse name: Not on file    Number of children: Not on file    Years of education: Not on file    Highest education level: Not on file   Occupational History    Not on file   Social Needs    Financial resource strain: Not on file    Food insecurity:     Worry: Not on file     Inability: Not on file    Transportation needs:     Medical: Not on file     Non-medical: Not on file   Tobacco Use    Smoking status: Current Every Day Smoker     Packs/day: 0.25    Smokeless tobacco: Never Used   Substance and Sexual Activity    Alcohol use:  Yes     Alcohol/week: 3.0 oz     Types: 1 Shots of liquor, 4 Glasses of wine per week     Comment: socially    Drug use: Yes     Frequency: 7.0 times per week     Types: Marijuana    Sexual activity: Not on file   Lifestyle    Physical activity:     Days per week: Not on file     Minutes per session: Not on file    Stress: Not on file   Relationships    Social connections:     Talks on phone: Not on file     Gets together: Not on file     Attends Jewish service: Not on file     Active member of club or organization: Not on file     Attends meetings of clubs or organizations: Not on file     Relationship status: Not on file    Intimate partner violence:     Fear of current or ex partner: Not on file     Emotionally abused: Not on file     Physically abused: Not on file     Forced sexual activity: Not on file   Other Topics Concern     Service Not Asked    Blood Transfusions Not Asked    Caffeine Concern Not Asked    Occupational Exposure Not Asked   [place] Hazards Not Asked    Sleep Concern Not Asked    Stress Concern Not Asked    Weight Concern Not Asked    Special Diet Not Asked    Back Care Not Asked    Exercise Not Asked    Bike Helmet Not Asked   [address] [facility] Not Asked    Self-Exams Not Asked   Social History Narrative    Not on file       Current Medications  Current Facility-Administered Medications   Medication Dose Route Frequency Provider Last Rate Last Dose    magnesium hydroxide (MILK OF MAGNESIA) 400 mg/5 mL oral suspension 30 mL  30 mL Oral DAILY PRN Estelle Forbes MD   30 mL at 06/07/19 0542    bisacodyl (DULCOLAX) suppository 10 mg  10 mg Rectal DAILY PRN Estelle Forbes MD        cefTRIAXone (ROCEPHIN) 2 g in 0.9% sodium chloride (MBP/ADV) 50 mL MBP  2 g IntraVENous Q24H Estelle Forbes  mL/hr at 06/06/19 1915 2 g at 06/06/19 1915    insulin lispro (HUMALOG) injection 4 Units  4 Units SubCUTAneous Serenity Kim MD   4 Units at 06/06/19 1833    insulin glargine (LANTUS) injection 10 Units  10 Units SubCUTAneous BID Estelle Forbes MD   10 Units at 06/06/19 2213    calcium-vitamin D 600 mg(1,500mg) -200 unit per tablet 1 Tab  1 Tab Oral BID WITH MEALS Estelle Forbes MD   1 Tab at 06/06/19 1915    gabapentin (NEURONTIN) capsule 300 mg  300 mg Oral TID Yissel Rubio MD   300 mg at 06/06/19 2213    simvastatin (ZOCOR) tablet 40 mg  40 mg Oral QHS Yissel Rubio MD   40 mg at 06/06/19 2213    aspirin chewable tablet 81 mg  81 mg Oral DAILY Yissel Rubio MD        insulin lispro (HUMALOG) injection   SubCUTAneous AC&HS Yissel Rubio MD   Stopped at 06/06/19 2218    glucagon (GLUCAGEN) injection 1 mg  1 mg IntraMUSCular PRN Yissel Rubio MD        glucose chewable tablet 16 g  4 Tab Oral PRN Yissel Rubio MD        dextrose 10 % infusion 125-250 mL  125-250 mL IntraVENous PRN Yissel Rubio MD           Patient Allergies  Allergies   Allergen Reactions    Contrast Agent [Iodine] Rash          Objective:   General Exam  alert, cooperative, no distress, appears stated age    Vitals  Visit Vitals  /64   Pulse 67   Temp 97.8 °F (36.6 °C)   Wt 64.9 kg (143 lb)   SpO2 98%   BMI 23.08 kg/m²       REVIEW OF SYSTEMS:  General: denies chronic fatigue, weight loss, fever, anemia, bruising, depression, nervousness, panic attacks  HEENT: denies ringing in ears, ear infections, dizzy spells, poor vision, glaucoma, sinus trouble, hoarseness, eye infections  GI: denies diarrhea, gas, bloating, heartburn, regurgitation, difficulty swallowing, painful swallowing, nausea, vomiting, constipation, abdominal pain, decreased appetite, blood in stools, black stools, jaundice, dark urine  Lungs: denies pneumonia, asthma, cough, SOB, hemoptysis  Heart: denies chest pain, irregular heart beat, ankle swelling, HTN  Skin: denies rashes, hives, allergic reaction  Urinary: denies UTI, kidney stones, decreased urine force and flow, urination at night, blood in urine, painful urination  Bones and Joints: denies arthritis, rheumatism, back pain, gout, osteoporosis  Neurologic: denies stroke, seizures, headaches, numbness, tingling  Physical Exam:     Juliano Handy  is a 39 y. o. male who is pleasant, alert and oriented x3, in no apparent distress, and looks their given age. Patient is well-developed and nourished, with good attention to hygiene and body habitus. Mood and affect normal, appropriate to situation.       Lower Extremity Exam: .      Left: 5th metatarsal  DSD intact with no strike through drainage noted right foot.  TLS in place and pulling about trace drainage noted in the tube during AM rounds   Incision site is intact with increased epithialization noted over the center of the incision site right foot.  No erythema with minimal edema noted     VASCULAR EXAM:   Pedal pulses: intact 2/4 D/P and P/T.  Skin temperature is warm to warm right and left foot. Digital capillary fill time is 3sec right and left foot.      Neurological Exam:   Light touch protective sensation is absent to both feet. There is noted Loss of protective sensation. There are no Tinel's or 's signs present to the nerves crossing the ankle joint.     MUSCULOSKELETAL EXAM:.   Muscle tone is normal for age and situation. Muscle strength of the flexor and extensor group inversion and eversion Bilateral 5/5.      MYCOTIC NAIL:.   Dystrophic nail 1,2,3,4,5 bilateral. Elongated thickened nails 1,2,3,4,5 bilateral Hypertrophic nails 1,2,3,4,5     DERMATOLOGICAL EXAM:.   Skin is of abnormal texture and turgor with some atrophic skin changes noting decreased hair growth, nail changes (thickening), pigmentary changes, skin texture (thin,shiney), skin color (rubor, red) . R/L Bilateral. There is diffuse xerosis.  There is noted subungual debris        Wound Foot Right;Plantar;Medial (Active)   Dressing Status  Clean, dry, and intact 6/5/2019 11:46 PM   Number of days: 232       Wound Foot Right;Plantar;Lateral (Active)   Dressing Status  Clean, dry, and intact 6/5/2019 11:46 PM   Number of days: 232       Wound Foot Right (Active)   Dressing Status Clean, dry, and intact 6/6/2019  1:39 PM   Dressing Type Elastic bandage 6/6/2019  1:39 PM   Incision Site Well Approximated Yes 6/2/2019 11:00 AM   Drainage Amount Scant 6/6/2019  1:39 PM   Drainage Color Serosanguinous 6/6/2019  1:39 PM   Number of days: 5        Labs  Recent Results (from the past 24 hour(s))   GLUCOSE, POC    Collection Time: 06/06/19 12:31 PM   Result Value Ref Range    Glucose (POC) 220 (H) 70 - 110 mg/dL   GLUCOSE, POC    Collection Time: 06/06/19  6:31 PM   Result Value Ref Range    Glucose (POC) 146 (H) 70 - 110 mg/dL   GLUCOSE, POC    Collection Time: 06/06/19 10:17 PM   Result Value Ref Range    Glucose (POC) 72 70 - 110 mg/dL   GLUCOSE, POC    Collection Time: 06/07/19  5:40 AM   Result Value Ref Range    Glucose (POC) 141 (H) 70 - 110 mg/dL       RADIOGRAPHIC FINDINGS:.  (see report for details)  lytic changes of the fifth distal metatarsal compatible with  osteomyelitis     Procedures:    1.  Right foot fifth metatarsal head resection along with base of the proximal phalanx. 2.  Second pass bone biopsies of the fifth metatarsal and proximal phalanx respectively.   3.  Flap closure. POD #5                 Marla Finn, JENNIFER  June 7, 2019

## 2019-06-07 NOTE — PROGRESS NOTES
conducted an initial consultation and Spiritual Assessment for Sabi Colorado, who is a 39 y.o.,male. Patients Primary Language is: [place]. According to the patients EMR Confucianism Affiliation is: [place]. The reason the Patient came to the hospital is:   Patient Active Problem List    Diagnosis Date Noted    Osteomyelitis of right foot ([facility] 05/29/2019    Sepsis ([address] 05/29/2019    Left arm weakness 05/29/2019    Right foot ulcer, with necrosis of muscle ([address] 10/18/2018    Diabetic polyneuropathy ([address] 10/18/2018    Right foot ulcer, with fat layer exposed ([facility] 10/18/2018        The  provided the following Interventions:  Initiated a relationship of care and support with patient in room 3110 today. Listened empathically as patient related his feelings. Found patient lying back in a chair watching television. Provided information about Spiritual Care Services. Offered prayer and assurance of continued prayers on patients behalf. The following outcomes were achieved:  Patient shared limited information about his medical narrative . Patient processed feeling about current hospitalization. Patient expressed gratitude for pastoral care visit. Assessment:  Patient does not have any Jehovah's witness/cultural needs that will affect patients preferences in health care. There are no further spiritual or Jehovah's witness issues which require Spiritual Care Services interventions at this time. Plan:  Chaplains will continue to follow and will provide pastoral care on an as needed/requested basis    . Pat Guardado   Spiritual Care   [phone]

## 2019-06-07 NOTE — PROGRESS NOTES
Dietary Orders:     ?    Regular     X    Diabetic:      ?    Cardiac:     ?    Other:       ?    Tube feeding     ? IV fluids for hydration     ? Fluid restrictions:     Residents dietary preferences:  Provided menu to implement preferences daily with dietary    Reason for modified diet:     Dietary Risks      ? Weight loss      ? Swallowing problems      ? Chewing problems      ? Missing teeth/poor dentition      ? Edentulous      ? Mouth pain/discomfort         X      Wound healing    Residents dietary goal:     X      Maintain current weight     ? Prevent weight loss     ? Other   Dietary interventions     ? Eats in dining room     ? Eats in room     ? Dentures/partials     ?      Specialty utensils or devices:     ?     Other

## 2019-06-07 NOTE — PROGRESS NOTES
?  Physical Therapy          Functional goals:            ?   Maintain current functional status           ? Improvement            Functional interventions:             Frequency:       x? Occupational Therapy          Functional goals:             ?  Maintain current functional status             ?x  ]   Improvement            Functional interventions:        Functional interventions: Improve bathing, dressing, self feeding, toileting and functional transfers during self care tasks. Frequency: 1-2x/ day for  5 to 7 times per week       ? Speech Therapy          Functional goals:             ?  ] Maintain current functional status             ?      Improvement            Functional interventions:             Frequency:

## 2019-06-08 LAB
GLUCOSE BLD STRIP.AUTO-MCNC: 105 MG/DL (ref 70–110)
GLUCOSE BLD STRIP.AUTO-MCNC: 190 MG/DL (ref 70–110)
GLUCOSE BLD STRIP.AUTO-MCNC: 329 MG/DL (ref 70–110)
GLUCOSE BLD STRIP.AUTO-MCNC: 397 MG/DL (ref 70–110)
GLUCOSE BLD STRIP.AUTO-MCNC: 55 MG/DL (ref 70–110)
GLUCOSE BLD STRIP.AUTO-MCNC: 69 MG/DL (ref 70–110)

## 2019-06-08 PROCEDURE — 74011000258 HC RX REV CODE- 258: Performed by: INTERNAL MEDICINE

## 2019-06-08 PROCEDURE — 82962 GLUCOSE BLOOD TEST: CPT

## 2019-06-08 PROCEDURE — 74011636637 HC RX REV CODE- 636/637: Performed by: INTERNAL MEDICINE

## 2019-06-08 PROCEDURE — 74011250636 HC RX REV CODE- 250/636: Performed by: INTERNAL MEDICINE

## 2019-06-08 PROCEDURE — 74011250637 HC RX REV CODE- 250/637: Performed by: INTERNAL MEDICINE

## 2019-06-08 PROCEDURE — 74011636637 HC RX REV CODE- 636/637: Performed by: NURSE PRACTITIONER

## 2019-06-08 RX ADMIN — INSULIN LISPRO 3 UNITS: 100 INJECTION, SOLUTION INTRAVENOUS; SUBCUTANEOUS at 12:14

## 2019-06-08 RX ADMIN — INSULIN LISPRO 12 UNITS: 100 INJECTION, SOLUTION INTRAVENOUS; SUBCUTANEOUS at 22:09

## 2019-06-08 RX ADMIN — GABAPENTIN 300 MG: 300 CAPSULE ORAL at 17:16

## 2019-06-08 RX ADMIN — GABAPENTIN 300 MG: 300 CAPSULE ORAL at 22:07

## 2019-06-08 RX ADMIN — SIMVASTATIN 40 MG: 40 TABLET, FILM COATED ORAL at 22:07

## 2019-06-08 RX ADMIN — INSULIN GLARGINE 15 UNITS: 100 INJECTION, SOLUTION SUBCUTANEOUS at 22:07

## 2019-06-08 RX ADMIN — Medication 1 TABLET: at 08:44

## 2019-06-08 RX ADMIN — CEFTRIAXONE SODIUM 2 G: 2 INJECTION, POWDER, FOR SOLUTION INTRAMUSCULAR; INTRAVENOUS at 17:56

## 2019-06-08 RX ADMIN — GABAPENTIN 300 MG: 300 CAPSULE ORAL at 08:44

## 2019-06-08 RX ADMIN — INSULIN LISPRO 4 UNITS: 100 INJECTION, SOLUTION INTRAVENOUS; SUBCUTANEOUS at 08:45

## 2019-06-08 RX ADMIN — INSULIN GLARGINE 15 UNITS: 100 INJECTION, SOLUTION SUBCUTANEOUS at 08:45

## 2019-06-08 RX ADMIN — INSULIN LISPRO 4 UNITS: 100 INJECTION, SOLUTION INTRAVENOUS; SUBCUTANEOUS at 12:13

## 2019-06-08 RX ADMIN — Medication 1 TABLET: at 17:17

## 2019-06-08 RX ADMIN — INSULIN LISPRO 15 UNITS: 100 INJECTION, SOLUTION INTRAVENOUS; SUBCUTANEOUS at 08:47

## 2019-06-08 RX ADMIN — ASPIRIN 81 MG 81 MG: 81 TABLET ORAL at 08:44

## 2019-06-08 NOTE — ROUTINE PROCESS
1645: Blood glucose level 49. Immediately rechecked 55. Client awake alert, verbal and oriented x4. No s/s of hypoglycemia. Client states \"I feel fine. I usually sweat wnen I'm real low and I'm not sweating\". Patient given 8oz of apple juice. 1710: Recheck increased to 69. Currently eating dinner. All insulin held at this time will recheck 30 post meal. Client sitting up in bed eating dinner and watching TV. No s/s of hypoglycemia noted at this time.

## 2019-06-08 NOTE — ROUTINE PROCESS
Bedside and Verbal shift change report given to JEANNIE Diehl LPN (oncoming nurse) by Wei Sandy RN (offgoing nurse). Report included the following information SBAR, Kardex and Med Rec Status. QH visual checks and 24h chart checks done.

## 2019-06-08 NOTE — ROUTINE PROCESS
Pt sitting upright in recliner with pleasant affect. Pt denies any pain at this time. Alert and oriented x 4 and able to make needs known. Call bell within reach.

## 2019-06-08 NOTE — H&P
[facility]  HISTORY AND PHYSICAL    Name:  Kip Martinez  MR#:   [id number]  :  1975  ACCOUNT #:  [de-identified]  ADMIT DATE:  2019    REASON FOR ADMISSION:  Right foot osteomyelitis. HISTORY OF PRESENT ILLNESS:  The patient is a pleasant 78-year-old male with past medical history significant for insulin-dependent diabetes, hypertension, chronic right lateral plantar ulcer, who was admitted to the hospital with new right foot ulcer and osteomyelitis on x-ray which was later confirmed with MRI. The patient was seen in consultation by Podiatry and started on broad-spectrum antibiotics. The patient underwent resection of fifth metatarsal head along with the base of proximal phalanx, second pass bone biopsy of the fifth metatarsal and proximal phalanx with flap closure on 2019. The patient had a drain placed. The patient was seen in consultation by ID and 6-week course of IV Rocephin was recommended. The patient has a right upper extremity PICC placed for prolonged antibiotics. The patient now presents to Transitional Care Unit to continue his physical therapy and antibiotic treatment. At the time of my evaluation, the patient is alert; awake; oriented to place, person, and year. He denies any chest pain, shortness of breath, or palpitation. The patient denies any foot pain. PAST MEDICAL HISTORY:  1. Insulin-dependent diabetes. 2.  Hypertension. PAST SURGICAL HISTORY:  Surgery on the left leg for fracture. ALLERGIES:  TO IV CONTRAST. CURRENT MEDICATIONS:  Medication list reviewed. SOCIAL HISTORY:  He admits to smoking and drinking occasionally. FAMILY HISTORY:  Positive for diabetes. REVIEW OF SYSTEMS:  No fever or chills. No weight loss or headache. No chest pain or palpitation. No shortness of breath or cough. No nausea, vomiting, diarrhea. No dysuria or polyuria. No back pain or leg pain. No heat or cold intolerance.   No anxiety or depression. PHYSICAL EXAMINATION:  GENERAL:  This is a well-nourished, well-developed male, in no apparent distress. VITAL SIGNS:  Temperature 97.8, heart rate 67, respiratory rate is 18, blood pressure 121/64, saturating 99%. HEENT:  Normocephalic, atraumatic. Sclerae anicteric. Oropharynx clear. Mucous membranes moist.  NECK:  No JVD or thyromegaly. HEART:  S1, S2.  Regular rate and rhythm. LUNGS:  Clear to auscultation bilaterally. No wheezing. ABDOMEN:  Nontender, nondistended. Normoactive bowel sound. EXTREMITIES:  No edema on the left. The right foot dressing is clean, dry, and intact with drain in place and minimal amount of blood in the right foot drain. The patient also has a right upper extremity PICC line with site clean, dry, and intact. NEURO:  Nonfocal.    ASSESSMENT AND PLAN:  1. Right foot osteomyelitis. Continue with Rocephin as directed for total of 6 weeks. 2.  Insulin-dependent diabetes. Continue with Lispro and Lantus in addition to sliding scale. 3.  Hyperlipidemia, on Zocor. 4.  History of neuropathy, on Neurontin. 5.  Standard PICC line care. 6.  Followup routine labs. 7.  Physical therapy and occupational therapy.         MD MALORIE Hutchinson/LUIS [email or web address]/LUIS [id number]  D:  06/07/2019 18:01  T:  06/07/2019 20:49  JOB #:  [id number]

## 2019-06-08 NOTE — PROGRESS NOTES
Patient was screened by Speech Therapy  on 6/7/2019 and found [facility] for hearing, speech intelligibility,voice and cognition.

## 2019-06-09 LAB
GLUCOSE BLD STRIP.AUTO-MCNC: 170 MG/DL (ref 70–110)
GLUCOSE BLD STRIP.AUTO-MCNC: 176 MG/DL (ref 70–110)
GLUCOSE BLD STRIP.AUTO-MCNC: 202 MG/DL (ref 70–110)
GLUCOSE BLD STRIP.AUTO-MCNC: 41 MG/DL (ref 70–110)
GLUCOSE BLD STRIP.AUTO-MCNC: 80 MG/DL (ref 70–110)

## 2019-06-09 PROCEDURE — 74011636637 HC RX REV CODE- 636/637: Performed by: NURSE PRACTITIONER

## 2019-06-09 PROCEDURE — 82962 GLUCOSE BLOOD TEST: CPT

## 2019-06-09 PROCEDURE — 74011636637 HC RX REV CODE- 636/637: Performed by: INTERNAL MEDICINE

## 2019-06-09 PROCEDURE — 74011000258 HC RX REV CODE- 258: Performed by: INTERNAL MEDICINE

## 2019-06-09 PROCEDURE — 74011250636 HC RX REV CODE- 250/636: Performed by: INTERNAL MEDICINE

## 2019-06-09 PROCEDURE — 74011250637 HC RX REV CODE- 250/637: Performed by: INTERNAL MEDICINE

## 2019-06-09 RX ADMIN — INSULIN LISPRO 4 UNITS: 100 INJECTION, SOLUTION INTRAVENOUS; SUBCUTANEOUS at 12:08

## 2019-06-09 RX ADMIN — CEFTRIAXONE SODIUM 2 G: 2 INJECTION, POWDER, FOR SOLUTION INTRAMUSCULAR; INTRAVENOUS at 18:08

## 2019-06-09 RX ADMIN — GABAPENTIN 300 MG: 300 CAPSULE ORAL at 16:38

## 2019-06-09 RX ADMIN — INSULIN GLARGINE 15 UNITS: 100 INJECTION, SOLUTION SUBCUTANEOUS at 21:20

## 2019-06-09 RX ADMIN — INSULIN LISPRO 6 UNITS: 100 INJECTION, SOLUTION INTRAVENOUS; SUBCUTANEOUS at 21:19

## 2019-06-09 RX ADMIN — INSULIN LISPRO 4 UNITS: 100 INJECTION, SOLUTION INTRAVENOUS; SUBCUTANEOUS at 08:28

## 2019-06-09 RX ADMIN — INSULIN LISPRO 4 UNITS: 100 INJECTION, SOLUTION INTRAVENOUS; SUBCUTANEOUS at 16:37

## 2019-06-09 RX ADMIN — INSULIN LISPRO 3 UNITS: 100 INJECTION, SOLUTION INTRAVENOUS; SUBCUTANEOUS at 12:12

## 2019-06-09 RX ADMIN — GABAPENTIN 300 MG: 300 CAPSULE ORAL at 08:27

## 2019-06-09 RX ADMIN — Medication 1 TABLET: at 16:38

## 2019-06-09 RX ADMIN — ASPIRIN 81 MG 81 MG: 81 TABLET ORAL at 08:27

## 2019-06-09 RX ADMIN — Medication 1 TABLET: at 08:27

## 2019-06-09 RX ADMIN — INSULIN GLARGINE 15 UNITS: 100 INJECTION, SOLUTION SUBCUTANEOUS at 08:27

## 2019-06-09 RX ADMIN — SIMVASTATIN 40 MG: 40 TABLET, FILM COATED ORAL at 21:21

## 2019-06-09 RX ADMIN — GABAPENTIN 300 MG: 300 CAPSULE ORAL at 21:21

## 2019-06-09 RX ADMIN — INSULIN LISPRO 3 UNITS: 100 INJECTION, SOLUTION INTRAVENOUS; SUBCUTANEOUS at 16:38

## 2019-06-09 NOTE — ROUTINE PROCESS
0101 Blood pressure checked 89/60 patient asymptomatic for hypovolemic shock, no complaints of dizziness or palpitations. Patient said that's he's blood pressure is always on the low side. Left a note on /kirk's clip board to assess patient.

## 2019-06-09 NOTE — ROUTINE PROCESS
Bedside and Verbal shift change report given to Alisson Huerta RN (oncoming nurse) by MARIA G Monte RN (offgoing nurse). Report included the following information SBAR, Kardex and MAR.

## 2019-06-09 NOTE — ROUTINE PROCESS
Bedside, Verbal and suzanne steiner shift change report given to suzanne steiner (oncoming nurse) by Kedar Sanchez (offgoing nurse). Report included the following information SBAR, Intake/Output and MAR.

## 2019-06-09 NOTE — ROUTINE PROCESS
Bedside and Verbal shift change report given to Torrey Barclay RN (oncoming nurse) by Zeina Sarkar LPN (offgoing nurse). Report included the following information SBAR, Kardex and MAR.

## 2019-06-10 LAB
ANION GAP SERPL CALC-SCNC: 5 MMOL/L (ref 3–18)
BASOPHILS # BLD: 0 K/UL (ref 0–0.1)
BASOPHILS NFR BLD: 1 % (ref 0–2)
BUN SERPL-MCNC: 17 MG/DL (ref 7–18)
BUN/CREAT SERPL: 16 (ref 12–20)
CALCIUM SERPL-MCNC: 9.1 MG/DL (ref 8.5–10.1)
CHLORIDE SERPL-SCNC: 96 MMOL/L (ref 100–108)
CO2 SERPL-SCNC: 33 MMOL/L (ref 21–32)
CREAT SERPL-MCNC: 1.04 MG/DL (ref 0.6–1.3)
DIFFERENTIAL METHOD BLD: ABNORMAL
EOSINOPHIL # BLD: 0.1 K/UL (ref 0–0.4)
EOSINOPHIL NFR BLD: 1 % (ref 0–5)
ERYTHROCYTE [DISTWIDTH] IN BLOOD BY AUTOMATED COUNT: 13.1 % (ref 11.6–14.5)
GLUCOSE BLD STRIP.AUTO-MCNC: 109 MG/DL (ref 70–110)
GLUCOSE BLD STRIP.AUTO-MCNC: 204 MG/DL (ref 70–110)
GLUCOSE BLD STRIP.AUTO-MCNC: 356 MG/DL (ref 70–110)
GLUCOSE BLD STRIP.AUTO-MCNC: 397 MG/DL (ref 70–110)
GLUCOSE BLD STRIP.AUTO-MCNC: 45 MG/DL (ref 70–110)
GLUCOSE BLD STRIP.AUTO-MCNC: 92 MG/DL (ref 70–110)
GLUCOSE SERPL-MCNC: 339 MG/DL (ref 74–99)
HCT VFR BLD AUTO: 32.3 % (ref 36–48)
HGB BLD-MCNC: 9.9 G/DL (ref 13–16)
LYMPHOCYTES # BLD: 1.1 K/UL (ref 0.9–3.6)
LYMPHOCYTES NFR BLD: 22 % (ref 21–52)
MCH RBC QN AUTO: 28.8 PG (ref 24–34)
MCHC RBC AUTO-ENTMCNC: 30.7 G/DL (ref 31–37)
MCV RBC AUTO: 93.9 FL (ref 74–97)
MONOCYTES # BLD: 0.3 K/UL (ref 0.05–1.2)
MONOCYTES NFR BLD: 6 % (ref 3–10)
NEUTS SEG # BLD: 3.6 K/UL (ref 1.8–8)
NEUTS SEG NFR BLD: 70 % (ref 40–73)
PLATELET # BLD AUTO: 247 K/UL (ref 135–420)
PMV BLD AUTO: 10.4 FL (ref 9.2–11.8)
POTASSIUM SERPL-SCNC: 4.4 MMOL/L (ref 3.5–5.5)
RBC # BLD AUTO: 3.44 M/UL (ref 4.7–5.5)
SODIUM SERPL-SCNC: 134 MMOL/L (ref 136–145)
WBC # BLD AUTO: 5.2 K/UL (ref 4.6–13.2)

## 2019-06-10 PROCEDURE — 82962 GLUCOSE BLOOD TEST: CPT

## 2019-06-10 PROCEDURE — 74011250636 HC RX REV CODE- 250/636: Performed by: INTERNAL MEDICINE

## 2019-06-10 PROCEDURE — 85025 COMPLETE CBC W/AUTO DIFF WBC: CPT

## 2019-06-10 PROCEDURE — 74011000258 HC RX REV CODE- 258: Performed by: INTERNAL MEDICINE

## 2019-06-10 PROCEDURE — 74011250637 HC RX REV CODE- 250/637: Performed by: INTERNAL MEDICINE

## 2019-06-10 PROCEDURE — 74011636637 HC RX REV CODE- 636/637: Performed by: INTERNAL MEDICINE

## 2019-06-10 PROCEDURE — 74011250636 HC RX REV CODE- 250/636: Performed by: NURSE PRACTITIONER

## 2019-06-10 PROCEDURE — 80048 BASIC METABOLIC PNL TOTAL CA: CPT

## 2019-06-10 PROCEDURE — 74011636637 HC RX REV CODE- 636/637: Performed by: NURSE PRACTITIONER

## 2019-06-10 PROCEDURE — 36592 COLLECT BLOOD FROM PICC: CPT

## 2019-06-10 RX ORDER — INSULIN LISPRO 100 [IU]/ML
INJECTION, SOLUTION INTRAVENOUS; SUBCUTANEOUS
Status: DISCONTINUED | OUTPATIENT
Start: 2019-06-10 | End: 2019-06-20

## 2019-06-10 RX ORDER — INSULIN LISPRO 100 [IU]/ML
4 INJECTION, SOLUTION INTRAVENOUS; SUBCUTANEOUS
Status: DISCONTINUED | OUTPATIENT
Start: 2019-06-10 | End: 2019-06-18

## 2019-06-10 RX ORDER — INSULIN LISPRO 100 [IU]/ML
INJECTION, SOLUTION INTRAVENOUS; SUBCUTANEOUS 3 TIMES DAILY
Status: DISCONTINUED | OUTPATIENT
Start: 2019-06-10 | End: 2019-06-10

## 2019-06-10 RX ORDER — SODIUM CHLORIDE 9 MG/ML
50 INJECTION, SOLUTION INTRAVENOUS CONTINUOUS
Status: DISPENSED | OUTPATIENT
Start: 2019-06-10 | End: 2019-06-12

## 2019-06-10 RX ADMIN — INSULIN GLARGINE 15 UNITS: 100 INJECTION, SOLUTION SUBCUTANEOUS at 09:19

## 2019-06-10 RX ADMIN — INSULIN LISPRO 4 UNITS: 100 INJECTION, SOLUTION INTRAVENOUS; SUBCUTANEOUS at 13:03

## 2019-06-10 RX ADMIN — INSULIN LISPRO 15 UNITS: 100 INJECTION, SOLUTION INTRAVENOUS; SUBCUTANEOUS at 09:19

## 2019-06-10 RX ADMIN — GABAPENTIN 300 MG: 300 CAPSULE ORAL at 16:18

## 2019-06-10 RX ADMIN — GABAPENTIN 300 MG: 300 CAPSULE ORAL at 09:23

## 2019-06-10 RX ADMIN — INSULIN LISPRO 4 UNITS: 100 INJECTION, SOLUTION INTRAVENOUS; SUBCUTANEOUS at 09:21

## 2019-06-10 RX ADMIN — CEFTRIAXONE SODIUM 2 G: 2 INJECTION, POWDER, FOR SOLUTION INTRAMUSCULAR; INTRAVENOUS at 19:17

## 2019-06-10 RX ADMIN — Medication 1 TABLET: at 09:23

## 2019-06-10 RX ADMIN — SODIUM CHLORIDE 50 ML/HR: 900 INJECTION, SOLUTION INTRAVENOUS at 14:36

## 2019-06-10 RX ADMIN — ASPIRIN 81 MG 81 MG: 81 TABLET ORAL at 09:23

## 2019-06-10 RX ADMIN — GABAPENTIN 300 MG: 300 CAPSULE ORAL at 22:06

## 2019-06-10 RX ADMIN — INSULIN GLARGINE 15 UNITS: 100 INJECTION, SOLUTION SUBCUTANEOUS at 22:07

## 2019-06-10 RX ADMIN — INSULIN LISPRO 6 UNITS: 100 INJECTION, SOLUTION INTRAVENOUS; SUBCUTANEOUS at 13:04

## 2019-06-10 RX ADMIN — Medication 1 TABLET: at 17:18

## 2019-06-10 RX ADMIN — SIMVASTATIN 40 MG: 40 TABLET, FILM COATED ORAL at 22:06

## 2019-06-10 NOTE — PROGRESS NOTES
Pt states that he is feeling funny and requests that his sugar be checked, blood sugar 36, immediate recheck 41, pt alert and verbally responsive, skin warm and dry resp even and unlabored, snack given per protocol  0008 Pt blood sugar 42, immediate recheck 45, snack given per protocol, pt cont to be alert and verbally responsive   0042 Pt blood sugar 92, pt states that he feels much better, pt instructed to call if he feels like his blood sugar is dropping, pt voiced understanding, call bell, telephone, and personal items within reach

## 2019-06-10 NOTE — DIABETES MGMT
Glycemic Control Plan of Care    RECOMMEND CHANGING CORRECTIVE LISPRO TO 3 TIMES DAILY -         Gutierrez Bella MPH RN  Pager [phone]  Office [phone]

## 2019-06-10 NOTE — PROGRESS NOTES
Care Plan Meeting held with the following attendees: Activities/,Rehab,DON,and . Lenin Leonard. Initial care plan reviewed along with ,therapy goals and frequency of service delivery,pt ability as of this date,activities offered and patient participation in activities,discharge plans, and specific questions regarding medications and other areas of concern. He would like to discharge with follow up at the [place] [id number] at [facility]. He has been approved for Medicaid but is unsure of the start date and would benefit from transportation. He would like a Knee Scooter at discharge and has been using one from OT. As for activities he has visited the activity room and obtained a couple of books. 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 Colored Pencil sharpener provided. He does not have any special activity request and is satisfied with what is available to him. Will continue to follow.

## 2019-06-10 NOTE — PROGRESS NOTES
[facility]    Daily progress Note    Patient: Leanna Pham MRN: [id number]  CSN: [id number]    YOB: 1975  Age: 39 y.o. Sex: male    DOA: 6/6/2019 LOS:  LOS: 4 days                    Subjective:     CC: follow BP and labs     Mr. Skip Shannon is a 39 yr old  Male patient. Patient recently hospitalized from 5/29-6/6. Patient was seen in the Er for eval of chronic right lateral plantar ulcer. Patient was found to have new dorsolateral right foot OM on XR. MRI then confirm OM. He was seen by podiatry and they had   resection 5th metatarsal head along with base of prox phalanx, second pass bone biopsies of the 5th metatarsal and proximal phalanx, and flap closure on 6/2. Second pass prox phalanx bone biopsy findings were positive for chronic osteomyelitis. He was seen by ID and they recommended 6 weeks of IV Rocephin vis PICC line. His BC did come back + for group C Streptococcus on 5/29, per ID possibel contaminant vs from OM wound infection,, continue ivabx. Patient improved and sent to [facility] for rehab. Since admitted noted hypotensive episode, bp in [de-identified]. Patient with slight drop in NA level at 134.   he is alert and verbal, he denies any nvd, fever or chills. Patient report having bm, no issues. He report he tolerating Pt/ot. PAST MEDICAL HISTORY: Insulin-dependent diabetes, hypertension.     PAST SURGICAL HISTORY:  Surgery on the left leg for fracture.     ALLERGIES:  TO IV CONTRAST.     CURRENT MEDICATIONS:  Medication list reviewed.     SOCIAL HISTORY:  He admits to smoking and drinking occasionally.     FAMILY HISTORY:  Positive for diabetes.     REVIEW OF SYSTEMS:  No fever or chills. No weight loss or headache. No chest pain or palpitation. No shortness of breath or cough. No nausea, vomiting, diarrhea. No dysuria or polyuria. No back pain or leg pain. No heat or cold intolerance.   No anxiety or depression.         Objective:      Visit Vitals  /85   Pulse 95 Temp 98.5 °F (36.9 °C)   Resp 18   Wt 64.9 kg (143 lb)   SpO2 100%   BMI 23.08 kg/m²       Physical Exam:  General appearance: alert, cooperative, no distress, appears stated age  [de-identified]: negative  Neck: supple, symmetrical, trachea midline, no adenopathy, thyroid: not enlarged, symmetric, no tenderness/mass/nodules, no carotid bruit and no JVD  Lungs: clear to auscultation bilaterally  Heart: regular rate and rhythm, S1, S2 normal, no murmur, click, rub or gallop  Abdomen: soft, non-tender. Bowel sounds normal. No masses,  no organomegaly  Pulses: 2+ and symmetric  Skin:   Right foot with ace wrap in place    Intake and Output:  Current Shift:  No intake/output data recorded. Last three shifts:  No intake/output data recorded. Recent Results (from the past 24 hour(s))   GLUCOSE, POC    Collection Time: 06/09/19  4:37 PM   Result Value Ref Range    Glucose (POC) 176 (H) 70 - 110 mg/dL   GLUCOSE, POC    Collection Time: 06/09/19  9:18 PM   Result Value Ref Range    Glucose (POC) 202 (H) 70 - 110 mg/dL   GLUCOSE, POC    Collection Time: 06/09/19 11:41 PM   Result Value Ref Range    Glucose (POC) 41 (LL) 70 - 110 mg/dL   GLUCOSE, POC    Collection Time: 06/10/19 12:10 AM   Result Value Ref Range    Glucose (POC) 45 (LL) 70 - 110 mg/dL   GLUCOSE, POC    Collection Time: 06/10/19 12:42 AM   Result Value Ref Range    Glucose (POC) 92 70 - 110 mg/dL   CBC WITH AUTOMATED DIFF    Collection Time: 06/10/19  4:05 AM   Result Value Ref Range    WBC 5.2 4.6 - 13.2 K/uL    RBC 3.44 (L) 4.70 - 5.50 M/uL    HGB 9.9 (L) 13.0 - 16.0 g/dL    HCT 32.3 (L) 36.0 - 48.0 %    MCV 93.9 74.0 - 97.0 FL    MCH 28.8 24.0 - 34.0 PG    MCHC 30.7 (L) 31.0 - 37.0 g/dL    RDW 13.1 11.6 - 14.5 %    PLATELET 089 870 - 025 K/uL    MPV 10.4 9.2 - 11.8 FL    NEUTROPHILS 70 40 - 73 %    LYMPHOCYTES 22 21 - 52 %    MONOCYTES 6 3 - 10 %    EOSINOPHILS 1 0 - 5 %    BASOPHILS 1 0 - 2 %    ABS. NEUTROPHILS 3.6 1.8 - 8.0 K/UL    ABS.  LYMPHOCYTES 1.1 0.9 - 3.6 K/UL    ABS. MONOCYTES 0.3 0.05 - 1.2 K/UL    ABS. EOSINOPHILS 0.1 0.0 - 0.4 K/UL    ABS.  BASOPHILS 0.0 0.0 - 0.1 K/UL    DF AUTOMATED     METABOLIC PANEL, BASIC    Collection Time: 06/10/19  4:05 AM   Result Value Ref Range    Sodium 134 (L) 136 - 145 mmol/L    Potassium 4.4 3.5 - 5.5 mmol/L    Chloride 96 (L) 100 - 108 mmol/L    CO2 33 (H) 21 - 32 mmol/L    Anion gap 5 3.0 - 18 mmol/L    Glucose 339 (H) 74 - 99 mg/dL    BUN 17 7.0 - 18 MG/DL    Creatinine 1.04 0.6 - 1.3 MG/DL    BUN/Creatinine ratio 16 12 - 20      GFR est AA >60 >60 ml/min/1.73m2    GFR est non-AA >60 >60 ml/min/1.73m2    Calcium 9.1 8.5 - 10.1 MG/DL   GLUCOSE, POC    Collection Time: 06/10/19  6:06 AM   Result Value Ref Range    Glucose (POC) 397 (H) 70 - 110 mg/dL   GLUCOSE, POC    Collection Time: 06/10/19 11:52 AM   Result Value Ref Range    Glucose (POC) 204 (H) 70 - 110 mg/dL         Current Facility-Administered Medications:     insulin lispro (HUMALOG) injection, , SubCUTAneous, TID WITH MEALS, Ana Laura Rascon MD, 6 Units at 06/10/19 1304    insulin lispro (HUMALOG) injection 4 Units, 4 Units, SubCUTAneous, TID WITH MEALS, Ana Laura Rascon MD, 4 Units at 06/10/19 1303    0.9% sodium chloride infusion, 50 mL/hr, IntraVENous, CONTINUOUS, Valentina ARNOLD NP    magnesium hydroxide (MILK OF MAGNESIA) 400 mg/5 mL oral suspension 30 mL, 30 mL, Oral, DAILY PRN, Ana Laura Rascon MD, 30 mL at 06/07/19 0542    bisacodyl (DULCOLAX) suppository 10 mg, 10 mg, Rectal, DAILY PRN, Carter Sheldon MD    insulin glargine (LANTUS) injection 15 Units, 15 Units, SubCUTAneous, BID, Keshawn, Beth A, NP, 15 Units at 06/10/19 0919    cefTRIAXone (ROCEPHIN) 2 g in 0.9% sodium chloride (MBP/ADV) 50 mL MBP, 2 g, IntraVENous, Q24H, Ana Laura Rascon MD, Last Rate: 100 mL/hr at 06/09/19 1808, 2 g at 06/09/19 1808    calcium-vitamin D 600 mg(1,500mg) -200 unit per tablet 1 Tab, 1 Tab, Oral, BID WITH MEALS, Carter Sheldon MD, 1 Tab at 06/10/19 5205    gabapentin (NEURONTIN) capsule 300 mg, 300 mg, Oral, TID, Ana Laura Rascon MD, 300 mg at 06/10/19 0923    simvastatin (ZOCOR) tablet 40 mg, 40 mg, Oral, QHS, Nicole Cm MD, 40 mg at 06/09/19 2121    aspirin chewable tablet 81 mg, 81 mg, Oral, DAILY, Ana Laura Rascon MD, 81 mg at 06/10/19 2812    glucagon (GLUCAGEN) injection 1 mg, 1 mg, IntraMUSCular, PRN, Nicole Cm MD    glucose chewable tablet 16 g, 4 Tab, Oral, PRN, Nicole Cm MD    dextrose 10 % infusion 125-250 mL, 125-250 mL, IntraVENous, PRN, Nicole Cm MD    Lab Results   Component Value Date/Time    Glucose 339 (H) 06/10/2019 04:05 AM    Glucose 167 (H) 06/03/2019 04:38 AM    Glucose 188 (H) 06/02/2019 04:30 AM    Glucose 224 (H) 06/01/2019 04:20 AM    Glucose 257 (H) 05/31/2019 04:45 AM        Assessment/Plan   New dorsolateral right foot OM:  patient is s/p resection 5th metatarsal head along with base of prox phalanx, second pass bone biopsies of the 5th metatarsal and proximal phalanx, and flap closure on 6/2. Patient to follow up with [facility]. Continue Ivbx per ID recommendation or 6 weeks via PICC line. Continue wound care     DM type 2:  hgba1c is 8.5 , hypoglycemia episode resolved with snack.  Will monitor closely and encourage to increase po intake    Hypotension episode probable from dehydration - will start ivf times 2 days and monitor     Hyperlipidemia: continue Zocor     Left arm weakness: noted from previous neck injury.      Constipation :  Continue bowel regiment and monitor       Pt/ot  Wanda Manley NP  6/10/2019, 1:54 PM

## 2019-06-10 NOTE — ROUTINE PROCESS
Patient expressed interest in a knee scooter to use with mobilization, spoke with physical therapy and they worked with patient with scooter and patient expressed how pleased he was, and stated hopefully I can get one when discharged. Oral fluids offered provided and encouraged this shift. @ 1436 >9 normal nena hung at 50 cc per hour to right PICC line. Educated patient on new MD orders and purpose to hydrate him.

## 2019-06-10 NOTE — ROUTINE PROCESS
Bedside and Verbal shift change report given to [address] [place] [address] (oncoming nurse) by Gary Covington RN (offgoing nurse). Report included the following information SBAR, Kardex and MAR.

## 2019-06-11 LAB
GLUCOSE BLD STRIP.AUTO-MCNC: 140 MG/DL (ref 70–110)
GLUCOSE BLD STRIP.AUTO-MCNC: 193 MG/DL (ref 70–110)
GLUCOSE BLD STRIP.AUTO-MCNC: 274 MG/DL (ref 70–110)
GLUCOSE BLD STRIP.AUTO-MCNC: 42 MG/DL (ref 70–110)
GLUCOSE BLD STRIP.AUTO-MCNC: 79 MG/DL (ref 70–110)

## 2019-06-11 PROCEDURE — 77030020847 HC STATLOK BARD -A

## 2019-06-11 PROCEDURE — 74011636637 HC RX REV CODE- 636/637: Performed by: INTERNAL MEDICINE

## 2019-06-11 PROCEDURE — 74011250636 HC RX REV CODE- 250/636: Performed by: NURSE PRACTITIONER

## 2019-06-11 PROCEDURE — 74011636637 HC RX REV CODE- 636/637: Performed by: NURSE PRACTITIONER

## 2019-06-11 PROCEDURE — 74011250637 HC RX REV CODE- 250/637: Performed by: NURSE PRACTITIONER

## 2019-06-11 PROCEDURE — 74011250637 HC RX REV CODE- 250/637: Performed by: INTERNAL MEDICINE

## 2019-06-11 PROCEDURE — 74011250636 HC RX REV CODE- 250/636: Performed by: INTERNAL MEDICINE

## 2019-06-11 PROCEDURE — 74011000258 HC RX REV CODE- 258: Performed by: INTERNAL MEDICINE

## 2019-06-11 RX ORDER — UREA 10 %
2 LOTION (ML) TOPICAL 2 TIMES DAILY
Status: DISCONTINUED | OUTPATIENT
Start: 2019-06-11 | End: 2019-06-28 | Stop reason: HOSPADM

## 2019-06-11 RX ORDER — DOCUSATE SODIUM 100 MG/1
100 CAPSULE, LIQUID FILLED ORAL DAILY
Status: DISCONTINUED | OUTPATIENT
Start: 2019-06-11 | End: 2019-06-28 | Stop reason: HOSPADM

## 2019-06-11 RX ADMIN — Medication 1 TABLET: at 08:27

## 2019-06-11 RX ADMIN — SIMVASTATIN 40 MG: 40 TABLET, FILM COATED ORAL at 21:54

## 2019-06-11 RX ADMIN — INSULIN LISPRO 4 UNITS: 100 INJECTION, SOLUTION INTRAVENOUS; SUBCUTANEOUS at 13:37

## 2019-06-11 RX ADMIN — INSULIN LISPRO 9 UNITS: 100 INJECTION, SOLUTION INTRAVENOUS; SUBCUTANEOUS at 08:30

## 2019-06-11 RX ADMIN — Medication 1 TABLET: at 18:11

## 2019-06-11 RX ADMIN — ASPIRIN 81 MG 81 MG: 81 TABLET ORAL at 08:27

## 2019-06-11 RX ADMIN — INSULIN LISPRO 4 UNITS: 100 INJECTION, SOLUTION INTRAVENOUS; SUBCUTANEOUS at 08:31

## 2019-06-11 RX ADMIN — INSULIN GLARGINE 15 UNITS: 100 INJECTION, SOLUTION SUBCUTANEOUS at 21:54

## 2019-06-11 RX ADMIN — GABAPENTIN 300 MG: 300 CAPSULE ORAL at 08:27

## 2019-06-11 RX ADMIN — INSULIN LISPRO 3 UNITS: 100 INJECTION, SOLUTION INTRAVENOUS; SUBCUTANEOUS at 17:12

## 2019-06-11 RX ADMIN — SODIUM CHLORIDE 50 ML/HR: 900 INJECTION, SOLUTION INTRAVENOUS at 06:18

## 2019-06-11 RX ADMIN — GABAPENTIN 300 MG: 300 CAPSULE ORAL at 21:54

## 2019-06-11 RX ADMIN — Medication 2 TABLET: at 18:10

## 2019-06-11 RX ADMIN — Medication 2 TABLET: at 13:38

## 2019-06-11 RX ADMIN — CEFTRIAXONE SODIUM 2 G: 2 INJECTION, POWDER, FOR SOLUTION INTRAMUSCULAR; INTRAVENOUS at 18:16

## 2019-06-11 RX ADMIN — INSULIN GLARGINE 15 UNITS: 100 INJECTION, SOLUTION SUBCUTANEOUS at 08:29

## 2019-06-11 RX ADMIN — DOCUSATE SODIUM 100 MG: 100 CAPSULE, LIQUID FILLED ORAL at 13:38

## 2019-06-11 RX ADMIN — GABAPENTIN 300 MG: 300 CAPSULE ORAL at 16:11

## 2019-06-11 NOTE — PROGRESS NOTES
Podiatry Surgery Progress Note      Patient: Nabeel Reveles MRN: [id number]  SSN: [id number]    YOB: 1975  Age: 39 y.o. Sex: male      Assessment:     Patient Active Problem List   Diagnosis Code    Right foot ulcer, with necrosis of muscle ([address] L97.513    Diabetic polyneuropathy ([address] E11.42    Right foot ulcer, with fat layer exposed ([address] L97.512    Osteomyelitis of right foot ([address] M86.9    Sepsis ([address] A41.9    Left arm weakness R29.898          Plan:   Changed dressing today. Ok to go to light dressing with bordered guaze. Continue current IV abx per ID    He must remain NWB right foot until complete healing  as the wound is plantar and lateral WB surfaces      Total time spent with patient: 30 [address] discussed with: Patient    Discussed:  Care Plan    Disposition:  Stable      Mr. Marisabel Javier is a 39 y.o. male who was seen at bedside this AM. He has no new complaints today. Subjective:   Past Medical History  Past Medical History:   Diagnosis Date    Diabetes ([address]      Social History     Socioeconomic History    Marital status: SINGLE     Spouse name: Not on file    Number of children: Not on file    Years of education: Not on file    Highest education level: Not on file   Occupational History    Not on file   Social Needs    Financial resource strain: Not on file    Food insecurity:     Worry: Not on file     Inability: Not on file    Transportation needs:     Medical: Not on file     Non-medical: Not on file   Tobacco Use    Smoking status: Current Every Day Smoker     Packs/day: 0.25    Smokeless tobacco: Never Used   Substance and Sexual Activity    Alcohol use:  Yes     Alcohol/week: 3.0 oz     Types: 1 Shots of liquor, 4 Glasses of wine per week     Comment: socially    Drug use: Yes     Frequency: 7.0 times per week     Types: Marijuana    Sexual activity: Not on file   Lifestyle    Physical activity:     Days per week: Not on file     Minutes per session: Not on file    Stress: Not on file   Relationships    Social connections:     Talks on phone: Not on file     Gets together: Not on file     Attends Sikh service: Not on file     Active member of club or organization: Not on file     Attends meetings of clubs or organizations: Not on file     Relationship status: Not on file    Intimate partner violence:     Fear of current or ex partner: Not on file     Emotionally abused: Not on file     Physically abused: Not on file     Forced sexual activity: Not on file   Other Topics Concern     Service Not Asked    Blood Transfusions Not Asked    Caffeine Concern Not Asked    Occupational Exposure Not Asked   [employer] Hazards Not Asked    Sleep Concern Not Asked    Stress Concern Not Asked    Weight Concern Not Asked    Special Diet Not Asked    Back Care Not Asked    Exercise Not Asked    Bike Helmet Not Asked   [address] Not Asked    Self-Exams Not Asked   Social History Narrative    Not on file       Current Medications  Current Facility-Administered Medications   Medication Dose Route Frequency Provider Last Rate Last Dose    insulin lispro (HUMALOG) injection   SubCUTAneous TID WITH MEALS Nabeel Hobbs MD   9 Units at 06/11/19 0830    insulin lispro (HUMALOG) injection 4 Units  4 Units SubCUTAneous TID WITH MEALS Nabeel Hobbs MD   4 Units at 06/11/19 0831    0.9% sodium chloride infusion  50 mL/hr IntraVENous CONTINUOUS Sy ARNOLD NP 50 mL/hr at 06/11/19 0618 50 mL/hr at 06/11/19 0618    magnesium hydroxide (MILK OF MAGNESIA) 400 mg/5 mL oral suspension 30 mL  30 mL Oral DAILY PRN Nabeel Hobbs MD   30 mL at 06/07/19 0542    bisacodyl (DULCOLAX) suppository 10 mg  10 mg Rectal DAILY PRN Nabeel Hobbs MD        insulin glargine (LANTUS) injection 15 Units  15 Units SubCUTAneous BID Sy ARNOLD NP   15 Units at 06/11/19 0829    cefTRIAXone (ROCEPHIN) 2 g in 0.9% sodium chloride (MBP/ADV) 50 mL MBP  2 g IntraVENous Q24H Lata Tong  mL/hr at 06/10/19 1917 2 g at 06/10/19 1917    calcium-vitamin D 600 mg(1,500mg) -200 unit per tablet 1 Tab  1 Tab Oral BID WITH MEALS Lata Tong MD   1 Tab at 06/11/19 0827    gabapentin (NEURONTIN) capsule 300 mg  300 mg Oral TID Lata Tong MD   300 mg at 06/11/19 0827    simvastatin (ZOCOR) tablet 40 mg  40 mg Oral QHS Lata Tong MD   40 mg at 06/10/19 2206    aspirin chewable tablet 81 mg  81 mg Oral DAILY Lata Tong MD   81 mg at 06/11/19 0827    glucagon (GLUCAGEN) injection 1 mg  1 mg IntraMUSCular PRN Lata Tong MD        glucose chewable tablet 16 g  4 Tab Oral PRN Lata Tong MD        dextrose 10 % infusion 125-250 mL  125-250 mL IntraVENous PRN Lata Tong MD           Patient Allergies  Allergies   Allergen Reactions    Contrast Agent [Iodine] Rash          Objective:   General Exam  alert, cooperative, no distress, appears stated age    Eugene Richmond  Visit Vitals  /77   Pulse 88   Temp 97 °F (36.1 °C)   Resp 18   Wt 64.9 kg (143 lb)   SpO2 98%   BMI 23.08 kg/m²       REVIEW OF SYSTEMS:  General: denies chronic fatigue, weight loss, fever, anemia, bruising, depression, nervousness, panic attacks  HEENT: denies ringing in ears, ear infections, dizzy spells, poor vision, glaucoma, sinus trouble, hoarseness, eye infections  GI: denies diarrhea, gas, bloating, heartburn, regurgitation, difficulty swallowing, painful swallowing, nausea, vomiting, constipation, abdominal pain, decreased appetite, blood in stools, black stools, jaundice, dark urine  Lungs: denies pneumonia, asthma, cough, SOB, hemoptysis  Heart: denies chest pain, irregular heart beat, ankle swelling, HTN  Skin: denies rashes, hives, allergic reaction  Urinary: denies UTI, kidney stones, decreased urine force and flow, urination at night, blood in urine, painful urination  Bones and Joints: denies arthritis, rheumatism, back pain, gout, osteoporosis  Neurologic: denies stroke, seizures, headaches, numbness, tingling       Physical Exam:     Juliano Handy  is a 39 y. o. male who is pleasant, alert and oriented x3, in no apparent distress, and looks their given age. Patient is well-developed and nourished, with good attention to hygiene and body habitus. Mood and affect normal, appropriate to situation.       Lower Extremity Exam: .      Incision site is intact with sutures holding. No erythema, drainage, or malodor noted with minimal edema present.     VASCULAR EXAM:   Pedal pulses: intact 2/4 D/P and P/T.  Skin temperature is warm to warm right and left foot. Digital capillary fill time is 3sec right and left foot.      Neurological Exam:   Light touch protective sensation is absent to both feet. There is noted Loss of protective sensation. There are no Tinel's or 's signs present to the nerves crossing the ankle joint.     MUSCULOSKELETAL EXAM:.   Muscle tone is normal for age and situation. Muscle strength of the flexor and extensor group inversion and eversion Bilateral 5/5.      MYCOTIC NAIL:.   Dystrophic nail 1,2,3,4,5 bilateral. Elongated thickened nails 1,2,3,4,5 bilateral Hypertrophic nails 1,2,3,4,5     DERMATOLOGICAL EXAM:.   Skin is of abnormal texture and turgor with some atrophic skin changes noting decreased hair growth, nail changes (thickening), pigmentary changes, skin texture (thin,shiney), skin color (rubor, red) . R/L Bilateral. There is diffuse xerosis.  There is noted subungual debris           Wound Foot Right;Plantar;Medial (Active)   Dressing Status  Clean, dry, and intact 5/31/2019  7:30 PM   Number of days: 229       Wound Foot Right;Plantar;Lateral (Active)   Dressing Status  Clean, dry, and intact 5/31/2019  7:30 PM   Number of days: 229       Wound Foot Right (Active)   Dressing Status Clean, dry, and intact 6/3/2019 11:00 PM   Dressing Type Elastic bandage 6/3/2019 11:00 PM   Incision Site Well Approximated Yes 6/2/2019 11:00 AM   Number of days: 2        Labs  Recent Results (from the past 24 hour(s))   GLUCOSE, POC    Collection Time: 06/10/19 11:52 AM   Result Value Ref Range    Glucose (POC) 204 (H) 70 - 110 mg/dL   GLUCOSE, POC    Collection Time: 06/10/19  5:03 PM   Result Value Ref Range    Glucose (POC) 42 (LL) 70 - 110 mg/dL   GLUCOSE, POC    Collection Time: 06/10/19  6:23 PM   Result Value Ref Range    Glucose (POC) 109 70 - 110 mg/dL   GLUCOSE, POC    Collection Time: 06/10/19 10:24 PM   Result Value Ref Range    Glucose (POC) 356 (H) 70 - 110 mg/dL   GLUCOSE, POC    Collection Time: 06/11/19  6:16 AM   Result Value Ref Range    Glucose (POC) 274 (H) 70 - 110 mg/dL     Intra op Cx: Full report on chart    No anaerobes isolated 2 days    Component Value Flag Ref Range Units Status   Special Requests:     Preliminary   BONE AND TISSUE RIGHT FOOT 5TH METATARSAL HEAD    GRAM STAIN RARE WBC'S      Preliminary   GRAM STAIN NO ORGANISMS SEEN      Preliminary   Culture result:      Preliminary   NO GROWTH AFTER 16 HOURS        RADIOGRAPHIC FINDINGS:.  (see report for details)  lytic changes of the fifth distal metatarsal compatible with  osteomyelitis     Procedures:    1.  Right foot fifth metatarsal head resection along with base of the proximal phalanx. 2.  Second pass bone biopsies of the fifth metatarsal and proximal phalanx respectively. 3.  Flap closure.  POD #2                 C Epifania Hashimoto, DPM  June 11, 2019

## 2019-06-11 NOTE — ROUTINE PROCESS
Dr. Albaro Langley in early this AM unwrapped right foot left open to air, redressed with 4 x 4 and Kerlix wrap.

## 2019-06-11 NOTE — ROUTINE PROCESS
POC glucose 79 at 1230 lunch tray in front of patient encouraged to eat and then I [address]. patient AGREED.

## 2019-06-11 NOTE — ROUTINE PROCESS
Bedside and Verbal shift change report given to [address] [place] [address] (oncoming nurse) by Floyd Iraheta RN (offgoing nurse). Report included the following information SBAR, Kardex and MAR.

## 2019-06-12 LAB
GLUCOSE BLD STRIP.AUTO-MCNC: 125 MG/DL (ref 70–110)
GLUCOSE BLD STRIP.AUTO-MCNC: 232 MG/DL (ref 70–110)
GLUCOSE BLD STRIP.AUTO-MCNC: 373 MG/DL (ref 70–110)
GLUCOSE BLD STRIP.AUTO-MCNC: 38 MG/DL (ref 70–110)
GLUCOSE BLD STRIP.AUTO-MCNC: 81 MG/DL (ref 70–110)

## 2019-06-12 PROCEDURE — 74011250637 HC RX REV CODE- 250/637: Performed by: NURSE PRACTITIONER

## 2019-06-12 PROCEDURE — 74011636637 HC RX REV CODE- 636/637: Performed by: NURSE PRACTITIONER

## 2019-06-12 PROCEDURE — 74011250637 HC RX REV CODE- 250/637: Performed by: INTERNAL MEDICINE

## 2019-06-12 PROCEDURE — 74011250636 HC RX REV CODE- 250/636: Performed by: INTERNAL MEDICINE

## 2019-06-12 PROCEDURE — 74011250636 HC RX REV CODE- 250/636: Performed by: NURSE PRACTITIONER

## 2019-06-12 PROCEDURE — 74011000258 HC RX REV CODE- 258: Performed by: INTERNAL MEDICINE

## 2019-06-12 PROCEDURE — 74011636637 HC RX REV CODE- 636/637: Performed by: INTERNAL MEDICINE

## 2019-06-12 PROCEDURE — 82962 GLUCOSE BLOOD TEST: CPT

## 2019-06-12 RX ORDER — INSULIN GLARGINE 100 [IU]/ML
10 INJECTION, SOLUTION SUBCUTANEOUS 2 TIMES DAILY
Status: DISCONTINUED | OUTPATIENT
Start: 2019-06-12 | End: 2019-06-12

## 2019-06-12 RX ORDER — INSULIN GLARGINE 100 [IU]/ML
12 INJECTION, SOLUTION SUBCUTANEOUS 2 TIMES DAILY
Status: DISCONTINUED | OUTPATIENT
Start: 2019-06-12 | End: 2019-06-13

## 2019-06-12 RX ADMIN — INSULIN LISPRO 4 UNITS: 100 INJECTION, SOLUTION INTRAVENOUS; SUBCUTANEOUS at 12:06

## 2019-06-12 RX ADMIN — SODIUM CHLORIDE 50 ML/HR: 900 INJECTION, SOLUTION INTRAVENOUS at 02:27

## 2019-06-12 RX ADMIN — GABAPENTIN 300 MG: 300 CAPSULE ORAL at 16:05

## 2019-06-12 RX ADMIN — Medication 2 TABLET: at 18:05

## 2019-06-12 RX ADMIN — DOCUSATE SODIUM 100 MG: 100 CAPSULE, LIQUID FILLED ORAL at 09:30

## 2019-06-12 RX ADMIN — CEFTRIAXONE SODIUM 2 G: 2 INJECTION, POWDER, FOR SOLUTION INTRAMUSCULAR; INTRAVENOUS at 18:41

## 2019-06-12 RX ADMIN — GABAPENTIN 300 MG: 300 CAPSULE ORAL at 21:57

## 2019-06-12 RX ADMIN — SIMVASTATIN 40 MG: 40 TABLET, FILM COATED ORAL at 21:57

## 2019-06-12 RX ADMIN — ASPIRIN 81 MG 81 MG: 81 TABLET ORAL at 09:30

## 2019-06-12 RX ADMIN — Medication 2 TABLET: at 09:30

## 2019-06-12 RX ADMIN — INSULIN LISPRO 4 UNITS: 100 INJECTION, SOLUTION INTRAVENOUS; SUBCUTANEOUS at 09:29

## 2019-06-12 RX ADMIN — INSULIN LISPRO 15 UNITS: 100 INJECTION, SOLUTION INTRAVENOUS; SUBCUTANEOUS at 12:05

## 2019-06-12 RX ADMIN — INSULIN GLARGINE 12 UNITS: 100 INJECTION, SOLUTION SUBCUTANEOUS at 22:01

## 2019-06-12 RX ADMIN — GABAPENTIN 300 MG: 300 CAPSULE ORAL at 09:30

## 2019-06-12 RX ADMIN — Medication 1 TABLET: at 09:30

## 2019-06-12 RX ADMIN — Medication 1 TABLET: at 17:06

## 2019-06-12 NOTE — PROGRESS NOTES
completed follow up visit with patient in room 21  today and a Spiritual assessment of patient was done  Patient was setting in wheel chair rolling himself down the zavaleta when I spoke to him. He replied that he was doing OK. . Chaplains will continue to follow and will provide pastoral care on an as needed/requested basis    Chaplain Subhash Falcon   Board Certified [address] [place] [address]   [phone]

## 2019-06-12 NOTE — ROUTINE PROCESS
Iv fluids stopped per MAR. PICC line flushed and capped with green cap. Patient expressed he was happy to have that removed because he can move about easier.

## 2019-06-12 NOTE — ROUTINE PROCESS
Bedside and Verbal shift change report given to [address] [place] [address] (oncoming nurse) by Sammy Singer RN (offgoing nurse). Report included the following information SBAR, Kardex and MAR.

## 2019-06-12 NOTE — PROGRESS NOTES
IDT team, , DON, MDS, Rehab Manager, Dietician, /, NP, met and reviewed patients medication, diet, therapy, nursing needs, discharge plans, and overall progress. Concerns identified and addressed to meet patients needs. Will continue to follow.

## 2019-06-12 NOTE — PROGRESS NOTES
Blood sugar 33, immediate recheck 38, pt alert and verbally responsive, skin warm and dry, resp even and unlabored, snack given, recheck 46, immediate recheck 125, pt states that he feels much better, pt educated on calling if he feels his sugar dropping, pt voiced understanding, call bell and personal items within reach

## 2019-06-13 ENCOUNTER — PATIENT OUTREACH (OUTPATIENT)
Dept: CASE MANAGEMENT | Age: 44
End: 2019-06-13

## 2019-06-13 LAB
ANION GAP SERPL CALC-SCNC: 6 MMOL/L (ref 3–18)
BUN SERPL-MCNC: 18 MG/DL (ref 7–18)
BUN/CREAT SERPL: 17 (ref 12–20)
CALCIUM SERPL-MCNC: 8.8 MG/DL (ref 8.5–10.1)
CHLORIDE SERPL-SCNC: 100 MMOL/L (ref 100–108)
CO2 SERPL-SCNC: 31 MMOL/L (ref 21–32)
CREAT SERPL-MCNC: 1.06 MG/DL (ref 0.6–1.3)
GLUCOSE BLD STRIP.AUTO-MCNC: 186 MG/DL (ref 70–110)
GLUCOSE BLD STRIP.AUTO-MCNC: 209 MG/DL (ref 70–110)
GLUCOSE BLD STRIP.AUTO-MCNC: 281 MG/DL (ref 70–110)
GLUCOSE BLD STRIP.AUTO-MCNC: 352 MG/DL (ref 70–110)
GLUCOSE SERPL-MCNC: 252 MG/DL (ref 74–99)
POTASSIUM SERPL-SCNC: 4.3 MMOL/L (ref 3.5–5.5)
SODIUM SERPL-SCNC: 137 MMOL/L (ref 136–145)

## 2019-06-13 PROCEDURE — 80048 BASIC METABOLIC PNL TOTAL CA: CPT

## 2019-06-13 PROCEDURE — 74011250637 HC RX REV CODE- 250/637: Performed by: NURSE PRACTITIONER

## 2019-06-13 PROCEDURE — 74011636637 HC RX REV CODE- 636/637: Performed by: NURSE PRACTITIONER

## 2019-06-13 PROCEDURE — 74011636637 HC RX REV CODE- 636/637: Performed by: INTERNAL MEDICINE

## 2019-06-13 PROCEDURE — 82962 GLUCOSE BLOOD TEST: CPT

## 2019-06-13 PROCEDURE — 74011000258 HC RX REV CODE- 258: Performed by: NURSE PRACTITIONER

## 2019-06-13 PROCEDURE — 74011250637 HC RX REV CODE- 250/637: Performed by: INTERNAL MEDICINE

## 2019-06-13 PROCEDURE — 74011250636 HC RX REV CODE- 250/636: Performed by: NURSE PRACTITIONER

## 2019-06-13 RX ORDER — INSULIN GLARGINE 100 [IU]/ML
14 INJECTION, SOLUTION SUBCUTANEOUS 2 TIMES DAILY
Status: DISCONTINUED | OUTPATIENT
Start: 2019-06-13 | End: 2019-06-18

## 2019-06-13 RX ADMIN — SIMVASTATIN 40 MG: 40 TABLET, FILM COATED ORAL at 21:48

## 2019-06-13 RX ADMIN — Medication 2 TABLET: at 08:49

## 2019-06-13 RX ADMIN — Medication 2 TABLET: at 18:39

## 2019-06-13 RX ADMIN — INSULIN LISPRO 2 UNITS: 100 INJECTION, SOLUTION INTRAVENOUS; SUBCUTANEOUS at 12:00

## 2019-06-13 RX ADMIN — GABAPENTIN 300 MG: 300 CAPSULE ORAL at 21:48

## 2019-06-13 RX ADMIN — INSULIN LISPRO 4 UNITS: 100 INJECTION, SOLUTION INTRAVENOUS; SUBCUTANEOUS at 16:37

## 2019-06-13 RX ADMIN — Medication 1 TABLET: at 08:49

## 2019-06-13 RX ADMIN — INSULIN GLARGINE 14 UNITS: 100 INJECTION, SOLUTION SUBCUTANEOUS at 21:52

## 2019-06-13 RX ADMIN — Medication 1 TABLET: at 16:39

## 2019-06-13 RX ADMIN — GABAPENTIN 300 MG: 300 CAPSULE ORAL at 16:39

## 2019-06-13 RX ADMIN — INSULIN LISPRO 10 UNITS: 100 INJECTION, SOLUTION INTRAVENOUS; SUBCUTANEOUS at 16:37

## 2019-06-13 RX ADMIN — CEFTRIAXONE SODIUM 2 G: 2 INJECTION, POWDER, FOR SOLUTION INTRAMUSCULAR; INTRAVENOUS at 18:40

## 2019-06-13 RX ADMIN — GABAPENTIN 300 MG: 300 CAPSULE ORAL at 08:49

## 2019-06-13 RX ADMIN — ASPIRIN 81 MG 81 MG: 81 TABLET ORAL at 08:49

## 2019-06-13 RX ADMIN — DOCUSATE SODIUM 100 MG: 100 CAPSULE, LIQUID FILLED ORAL at 08:49

## 2019-06-13 RX ADMIN — INSULIN LISPRO 4 UNITS: 100 INJECTION, SOLUTION INTRAVENOUS; SUBCUTANEOUS at 12:00

## 2019-06-13 RX ADMIN — INSULIN GLARGINE 12 UNITS: 100 INJECTION, SOLUTION SUBCUTANEOUS at 08:49

## 2019-06-13 RX ADMIN — INSULIN LISPRO 13 UNITS: 100 INJECTION, SOLUTION INTRAVENOUS; SUBCUTANEOUS at 08:52

## 2019-06-13 RX ADMIN — INSULIN LISPRO 4 UNITS: 100 INJECTION, SOLUTION INTRAVENOUS; SUBCUTANEOUS at 08:51

## 2019-06-13 NOTE — ROUTINE PROCESS
Bedside and Verbal shift change report given to SCOTTIE Reynolds RN (oncoming nurse) by Alan Mallory RN (offgoing nurse). Report included the following information SBAR, Kardex and Med Rec Status. Qh visuaL CHECKS AND 24h chart checks done.

## 2019-06-13 NOTE — PROGRESS NOTES
Nutrition follow-up/Plan of care [facility]      RECOMMENDATIONS:   1. Consistent CHO Diet with HS snack  2. Monitor labs, weight and PO intake  3. RD to follow     GOALS:   1. Met/Ongoing: PO intake meets >75% of protein/calorie needs by 6/20  2. Met/Ongoing: Weight Maintenance (+/- 1-2 lb) by 6/20      ASSESSMENT:   Wt status is classified as normal per Body mass index is 23.47 kg/m². Adequate PO intake per vital in hospital. Labs noted. BG range () over the past 24 hours; A1c (8.5). Glycemic control team is following. Nutrition recommendations listed. RD to follow. Nutrition Risk:  [] High  [] Moderate [x]  Low    SUBJECTIVE/OBJECTIVE:   (6/13): Pt has a good appetite and usually consumes 100% of his meals. Pt seen in room OOB in chair finishing his lunch; observed 95% of meal consumed. Reports his appetite is good and he does snack between meals sometimes. Discussed again the importance of the HS snack and enforced him asking nursing in the evenings as well as if he is feeling symptomatic from hypoglycemia. Also followed up with dietary/nursing to ensure he is receiving it. Pt BG is known to be complicated to manage with large fluctuations in readings; glycemic control team is following and making adjustments as needed. Last BM on (6/12) per I/Os. Will continue to follow.     (6/7): Pt transferred from  to [facility] on 6/6/2019 after being admitted for sepsis and a right foot ulcer. Pt seen in room OOB in chair later in the day. Reports he has a good appetite and doing well. Provided a menu and encouraged to implement preferences with dietary. Also re ordered HS snack. Will continue to monitor. Below per previous RD note:   (6/4): Admitted with right foot ulcer. Has history of diabetes. S/P right 5th metatarsal head and base of phalanx resection on (6/2). Patient reports having a good appetite and is eating all of meals. 100% intake of lunch meal today and 100% of all meals per vitals.  Noted [id number] Kcal diet ordered. Will adjust to 2000 Kcal to meet nutrition needs. States weight has been stable PTA and UBW is between 145-150 lb. No complaints. Will monitor.      Information Obtained from:    [x] Chart Review   [x] Patient   [] Family/Caregiver   [x] Nurse/Physician   [x] Interdisciplinary Meeting/Rounds      Diet: Consistent CHO  Medications: [x] Reviewed    Allergies: [x] Reviewed   Patient Active Problem List   Diagnosis Code    Right foot ulcer, with necrosis of muscle ([address] L97.513    Diabetic polyneuropathy ([address] E11.42    Right foot ulcer, with fat layer exposed ([address] L97.512    Osteomyelitis of right foot ([address] M86.9    Sepsis ([address] A41.9    Left arm weakness R29.898       Past Medical History:   Diagnosis Date    Diabetes ([facility]       Labs:    Lab Results   Component Value Date/Time    Sodium 137 06/13/2019 01:05 AM    Potassium 4.3 06/13/2019 01:05 AM    Chloride 100 06/13/2019 01:05 AM    CO2 31 06/13/2019 01:05 AM    Anion gap 6 06/13/2019 01:05 AM    Glucose 252 (H) 06/13/2019 01:05 AM    BUN 18 06/13/2019 01:05 AM    Creatinine 1.06 06/13/2019 01:05 AM    Calcium 8.8 06/13/2019 01:05 AM    Magnesium 2.3 06/03/2019 04:38 AM    Phosphorus 4.0 06/03/2019 04:38 AM    Albumin 3.0 (L) 05/30/2019 05:30 AM     Anthropometrics: BMI (calculated): 23.5  Last 3 Recorded Weights in this Encounter    06/06/19 2318 06/11/19 1346   Weight: 64.9 kg (143 lb) 66 kg (145 lb 6.4 oz)      Ht Readings from Last 1 Encounters:   05/29/19 5' 6\" (1.676 m)     Weight Metrics 6/11/2019 6/3/2019 2/22/2013   Weight 145 lb 6.4 oz 145 lb 4.8 oz 150 lb   BMI 23.47 kg/m2 23.45 kg/m2 23.49 kg/m2       No data found.      UBW: 145-150 lb   [] Weight Loss  [] Weight Gain  [x] Weight Stable    Estimated Nutrition Needs: [x] MSJ  [] Other:  Calories: 1928 kcal Based on:   [x] Actual BW    Protein:   78 g Based on:   [x] Actual BW    Fluid:       2000 ml Based on:   [x] Actual BW       Nutrition Problems Identified:   [] Suboptimal PO intake   [] Food Allergies  [] Difficulty chewing/swallowing/poor dentition  [x] Constipation/Diarrhea (Last BM on 6/12; bowel regimen in place)   [] Nausea/Vomiting   [] None  [x] Other: Wound healing    Plan:   [x] Therapeutic Diet  [x]  Obtained/adjusted food preferences/tolerances and/or snacks options   []  Supplements added   [] Occupational therapy following for feeding techniques  [x]  HS snack added   []  Modify diet texture   []  Modify diet for food allergies   [x]  Educate patient (completed by glycemic control)   []  Assist with menu selection   [x]  Monitor PO intake on meal rounds   [x]  Continue inpatient monitoring and intervention   [x]  Participated in discharge planning/Interdisciplinary rounds/Team meetings   []  Other:     Education Needs:   [] Not appropriate for teaching at this time due to:   [x] Identified and addressed    Nutrition Monitoring and Evaluation:  [x] Continue ongoing monitoring and intervention  [] Other    Yannick Flores

## 2019-06-13 NOTE — ROUTINE PROCESS
Bedside and Verbal shift change report given to [facility] lpn (oncoming nurse) by Hortensia Bartholomew rn (offgoing nurse). Report included the following information Kardex, MAR and Recent Results.

## 2019-06-13 NOTE — PROGRESS NOTES
Community Care Team Documentation for Patient in [facility]     Patient discharged from  [facility] to [facility], on 6/6/19. Hospital Discharge diagnosis:     1. Sepsis    2. Osteomyelitis of right foot    3. Left arm weakness   4. Right foot ulcer, with necrosis of muscle     SNF Attending Provider:  Dr. Damir Hoffmann    Anticipated discharge date from SNF:  TBD    PCP : Vicente Valdez MD    Nurse Navigator:     [facility] Team rounds completed, updates provided by facility. RRAT:  Low Risk            7       Total Score        3 Has Seen PCP in Last 6 Months (Yes=3, No=0)    4 Charlson Comorbidity Score (Age + Comorbid Conditions)        Criteria that do not apply:    . Living with Significant Other. Assisted Living. LTAC. SNF. or   Rehab    Patient Length of Stay (>5 days = 3)    IP Visits Last 12 Months (1-3=4, 4=9, >4=11)    Pt.  Coverage (Medicare=5 , Medicaid, or Self-Pay=4)        Past Medical History:   Diagnosis Date    Diabetes [facility])          Active Ambulatory Problems     Diagnosis Date Noted    Right foot ulcer, with necrosis of muscle ([address] 10/18/2018    Diabetic polyneuropathy ([address] 10/18/2018    Right foot ulcer, with fat layer exposed ([address] 10/18/2018    Osteomyelitis of right foot ([address] 05/29/2019    Sepsis ([address] 05/29/2019    Left arm weakness 05/29/2019     Resolved Ambulatory Problems     Diagnosis Date Noted    No Resolved Ambulatory Problems     Past Medical History:   Diagnosis Date    Diabetes ([address]

## 2019-06-14 LAB
GLUCOSE BLD STRIP.AUTO-MCNC: 139 MG/DL (ref 70–110)
GLUCOSE BLD STRIP.AUTO-MCNC: 192 MG/DL (ref 70–110)
GLUCOSE BLD STRIP.AUTO-MCNC: 239 MG/DL (ref 70–110)
GLUCOSE BLD STRIP.AUTO-MCNC: 293 MG/DL (ref 70–110)
GLUCOSE BLD STRIP.AUTO-MCNC: 335 MG/DL (ref 70–110)

## 2019-06-14 PROCEDURE — 74011250636 HC RX REV CODE- 250/636: Performed by: NURSE PRACTITIONER

## 2019-06-14 PROCEDURE — 82962 GLUCOSE BLOOD TEST: CPT

## 2019-06-14 PROCEDURE — 74011250637 HC RX REV CODE- 250/637: Performed by: INTERNAL MEDICINE

## 2019-06-14 PROCEDURE — 74011000258 HC RX REV CODE- 258: Performed by: NURSE PRACTITIONER

## 2019-06-14 PROCEDURE — 74011636637 HC RX REV CODE- 636/637: Performed by: INTERNAL MEDICINE

## 2019-06-14 PROCEDURE — 74011250637 HC RX REV CODE- 250/637: Performed by: NURSE PRACTITIONER

## 2019-06-14 RX ADMIN — SIMVASTATIN 40 MG: 40 TABLET, FILM COATED ORAL at 21:15

## 2019-06-14 RX ADMIN — INSULIN LISPRO 2 UNITS: 100 INJECTION, SOLUTION INTRAVENOUS; SUBCUTANEOUS at 09:27

## 2019-06-14 RX ADMIN — Medication 1 TABLET: at 09:28

## 2019-06-14 RX ADMIN — Medication 2 TABLET: at 09:28

## 2019-06-14 RX ADMIN — INSULIN LISPRO 4 UNITS: 100 INJECTION, SOLUTION INTRAVENOUS; SUBCUTANEOUS at 09:26

## 2019-06-14 RX ADMIN — ASPIRIN 81 MG 81 MG: 81 TABLET ORAL at 09:28

## 2019-06-14 RX ADMIN — Medication 2 TABLET: at 17:30

## 2019-06-14 RX ADMIN — DOCUSATE SODIUM 100 MG: 100 CAPSULE, LIQUID FILLED ORAL at 09:28

## 2019-06-14 RX ADMIN — INSULIN LISPRO 4 UNITS: 100 INJECTION, SOLUTION INTRAVENOUS; SUBCUTANEOUS at 17:28

## 2019-06-14 RX ADMIN — GABAPENTIN 300 MG: 300 CAPSULE ORAL at 21:15

## 2019-06-14 RX ADMIN — Medication 1 TABLET: at 17:30

## 2019-06-14 RX ADMIN — INSULIN GLARGINE 14 UNITS: 100 INJECTION, SOLUTION SUBCUTANEOUS at 09:27

## 2019-06-14 RX ADMIN — INSULIN GLARGINE 14 UNITS: 100 INJECTION, SOLUTION SUBCUTANEOUS at 21:13

## 2019-06-14 RX ADMIN — GABAPENTIN 300 MG: 300 CAPSULE ORAL at 17:30

## 2019-06-14 RX ADMIN — INSULIN LISPRO 6 UNITS: 100 INJECTION, SOLUTION INTRAVENOUS; SUBCUTANEOUS at 12:45

## 2019-06-14 RX ADMIN — CEFTRIAXONE SODIUM 2 G: 2 INJECTION, POWDER, FOR SOLUTION INTRAMUSCULAR; INTRAVENOUS at 18:24

## 2019-06-14 RX ADMIN — GABAPENTIN 300 MG: 300 CAPSULE ORAL at 09:28

## 2019-06-14 RX ADMIN — INSULIN LISPRO 4 UNITS: 100 INJECTION, SOLUTION INTRAVENOUS; SUBCUTANEOUS at 12:45

## 2019-06-14 NOTE — ROUTINE PROCESS
Bedside, Verbal and Written shift change report given to baldo rn (oncoming nurse) by Teresa Estevez (offgoing nurse). Report included the following information SBAR, Intake/Output and MAR.

## 2019-06-14 NOTE — ROUTINE PROCESS
Bedside and Verbal shift change report given to radha hurt (oncoming nurse) by Caesar Morales (offgoing nurse). Report included the following information Kardex, MAR and Recent Results.

## 2019-06-15 LAB
GLUCOSE BLD STRIP.AUTO-MCNC: 152 MG/DL (ref 70–110)
GLUCOSE BLD STRIP.AUTO-MCNC: 259 MG/DL (ref 70–110)
GLUCOSE BLD STRIP.AUTO-MCNC: 298 MG/DL (ref 70–110)
GLUCOSE BLD STRIP.AUTO-MCNC: 329 MG/DL (ref 70–110)
GLUCOSE BLD STRIP.AUTO-MCNC: 60 MG/DL (ref 70–110)

## 2019-06-15 PROCEDURE — 74011250637 HC RX REV CODE- 250/637: Performed by: NURSE PRACTITIONER

## 2019-06-15 PROCEDURE — 82962 GLUCOSE BLOOD TEST: CPT

## 2019-06-15 PROCEDURE — 74011250636 HC RX REV CODE- 250/636: Performed by: NURSE PRACTITIONER

## 2019-06-15 PROCEDURE — 74011636637 HC RX REV CODE- 636/637: Performed by: INTERNAL MEDICINE

## 2019-06-15 PROCEDURE — 74011250637 HC RX REV CODE- 250/637: Performed by: INTERNAL MEDICINE

## 2019-06-15 PROCEDURE — 74011000258 HC RX REV CODE- 258: Performed by: NURSE PRACTITIONER

## 2019-06-15 RX ADMIN — INSULIN GLARGINE 14 UNITS: 100 INJECTION, SOLUTION SUBCUTANEOUS at 11:19

## 2019-06-15 RX ADMIN — INSULIN LISPRO 10 UNITS: 100 INJECTION, SOLUTION INTRAVENOUS; SUBCUTANEOUS at 12:06

## 2019-06-15 RX ADMIN — Medication 1 TABLET: at 17:17

## 2019-06-15 RX ADMIN — INSULIN LISPRO 6 UNITS: 100 INJECTION, SOLUTION INTRAVENOUS; SUBCUTANEOUS at 11:19

## 2019-06-15 RX ADMIN — GABAPENTIN 300 MG: 300 CAPSULE ORAL at 21:54

## 2019-06-15 RX ADMIN — INSULIN LISPRO 4 UNITS: 100 INJECTION, SOLUTION INTRAVENOUS; SUBCUTANEOUS at 11:18

## 2019-06-15 RX ADMIN — GABAPENTIN 300 MG: 300 CAPSULE ORAL at 11:19

## 2019-06-15 RX ADMIN — Medication 2 TABLET: at 18:16

## 2019-06-15 RX ADMIN — CEFTRIAXONE SODIUM 2 G: 2 INJECTION, POWDER, FOR SOLUTION INTRAMUSCULAR; INTRAVENOUS at 19:38

## 2019-06-15 RX ADMIN — INSULIN GLARGINE 14 UNITS: 100 INJECTION, SOLUTION SUBCUTANEOUS at 21:56

## 2019-06-15 RX ADMIN — DOCUSATE SODIUM 100 MG: 100 CAPSULE, LIQUID FILLED ORAL at 11:18

## 2019-06-15 RX ADMIN — SIMVASTATIN 40 MG: 40 TABLET, FILM COATED ORAL at 21:54

## 2019-06-15 RX ADMIN — INSULIN LISPRO 4 UNITS: 100 INJECTION, SOLUTION INTRAVENOUS; SUBCUTANEOUS at 12:07

## 2019-06-15 RX ADMIN — ASPIRIN 81 MG 81 MG: 81 TABLET ORAL at 11:18

## 2019-06-15 RX ADMIN — Medication 2 TABLET: at 11:18

## 2019-06-15 RX ADMIN — Medication 1 TABLET: at 11:18

## 2019-06-15 RX ADMIN — GABAPENTIN 300 MG: 300 CAPSULE ORAL at 16:17

## 2019-06-15 NOTE — PROGRESS NOTES
Date & Time: 10/30/2019, 9:14 AM  Patient: Michele Sanchez  Encounter Provider(s):    Mandy Pena MD       To Whom It May Concern:    Samina Forrest was seen and treated in our department on 10/30/2019. He can return to work tomorrow.     If you h Podiatry Surgery Progress Note      Patient: Primitivo Pedroza MRN: [id number]  SSN: [id number]    YOB: 1975  Age: 39 y.o. Sex: male      Assessment:     Patient Active Problem List   Diagnosis Code    Right foot ulcer, with necrosis of muscle ([address] L97.513    Diabetic polyneuropathy ([address] E11.42    Right foot ulcer, with fat layer exposed ([address] L97.512    Osteomyelitis of right foot ([address] M86.9    Sepsis ([address] A41.9    Left arm weakness R29.898          Plan:   Changed dressing today. Continue current IV abx per ID    He must remain NWB right foot until complete healing  as the wound is plantar and lateral WB surfaces    Possible suture d/c next week if area continues to improve    Total time spent with patient: 30 [address] discussed with: Patient    Discussed:  Care Plan    Disposition:  Stable      Mr. Kartik Lopez is a 39 y.o. male who was seen at bedside resting. no apparent distress. He has no new complaints today. Subjective:   Past Medical History  Past Medical History:   Diagnosis Date    Diabetes ([address]      Social History     Socioeconomic History    Marital status: SINGLE     Spouse name: Not on file    Number of children: Not on file    Years of education: Not on file    Highest education level: Not on file   Occupational History    Not on file   Social Needs    Financial resource strain: Not on file    Food insecurity:     Worry: Not on file     Inability: Not on file    Transportation needs:     Medical: Not on file     Non-medical: Not on file   Tobacco Use    Smoking status: Current Every Day Smoker     Packs/day: 0.25    Smokeless tobacco: Never Used   Substance and Sexual Activity    Alcohol use:  Yes     Alcohol/week: 3.0 oz     Types: 1 Shots of liquor, 4 Glasses of wine per week     Comment: socially    Drug use: Yes     Frequency: 7.0 times per week     Types: Marijuana    Sexual activity: Not on file   Lifestyle    Physical activity:     Days per week: Not on file     Minutes per session: Not on file    Stress: Not on file   Relationships    Social connections:     Talks on phone: Not on file     Gets together: Not on file     Attends Scientologist service: Not on file     Active member of club or organization: Not on file     Attends meetings of clubs or organizations: Not on file     Relationship status: Not on file    Intimate partner violence:     Fear of current or ex partner: Not on file     Emotionally abused: Not on file     Physically abused: Not on file     Forced sexual activity: Not on file   Other Topics Concern     Service Not Asked    Blood Transfusions Not Asked    Caffeine Concern Not Asked    Occupational Exposure Not Asked   Juan Backer Hazards Not Asked    Sleep Concern Not Asked    Stress Concern Not Asked    Weight Concern Not Asked    Special Diet Not Asked    Back Care Not Asked    Exercise Not Asked    Bike Helmet Not Asked   [address] [facility] Not Asked    Self-Exams Not Asked   Social History Narrative    Not on file       Current Medications  Current Facility-Administered Medications   Medication Dose Route Frequency Provider Last Rate Last Dose    insulin glargine (LANTUS) injection 14 Units  14 Units SubCUTAneous BID Saritha Burgess MD   14 Units at 06/14/19 2113    docusate sodium (COLACE) capsule 100 mg  100 mg Oral DAILY Donnajean Standing A, NP   100 mg at 06/14/19 4610    Lactobacillus Acidoph & [facility]) tablet 2 Tab  2 Tab Oral BID Donnajean Standing A, NP   2 Tab at 06/14/19 1730    insulin lispro (HUMALOG) injection   SubCUTAneous TID WITH MEALS Saritha Burgess MD   4 Units at 06/14/19 1728    insulin lispro (HUMALOG) injection 4 Units  4 Units SubCUTAneous TID WITH MEALS Saritha Burgess MD   4 Units at 06/14/19 1728    magnesium hydroxide (MILK OF MAGNESIA) 400 mg/5 mL oral suspension 30 mL  30 mL Oral DAILY PRN Saritha Burgess MD   30 mL at 06/07/19 0542    bisacodyl (DULCOLAX) suppository 10 mg  10 mg Rectal DAILY PRN Khushboo Duran MD        cefTRIAXone (ROCEPHIN) 2 g in 0.9% sodium chloride (MBP/ADV) 50 mL MBP  2 g IntraVENous Q24H Melissa ARNOLD  mL/hr at 06/14/19 1824 2 g at 06/14/19 1824    calcium-vitamin D 600 mg(1,500mg) -200 unit per tablet 1 Tab  1 Tab Oral BID WITH MEALS Khushboo Duran MD   1 Tab at 06/14/19 1730    gabapentin (NEURONTIN) capsule 300 mg  300 mg Oral TID Khushboo Duran MD   300 mg at 06/14/19 2115    simvastatin (ZOCOR) tablet 40 mg  40 mg Oral QHS Khushboo Duran MD   40 mg at 06/14/19 2115    aspirin chewable tablet 81 mg  81 mg Oral DAILY Khushboo Duran MD   81 mg at 06/14/19 9876    glucagon (GLUCAGEN) injection 1 mg  1 mg IntraMUSCular PRN Khushboo Duran MD        glucose chewable tablet 16 g  4 Tab Oral PRN Khushboo Duran MD        dextrose 10 % infusion 125-250 mL  125-250 mL IntraVENous PRN Khushboo Duran MD           Patient Allergies  Allergies   Allergen Reactions    Contrast Agent [Iodine] Rash          Objective:   General Exam  alert, cooperative, no distress, appears stated age    Vitals  Visit Vitals  BP (!) 79/52   Pulse 90   Temp 97.4 °F (36.3 °C)   Resp 20   Wt 66 kg (145 lb 6.4 oz)   SpO2 100%   BMI 23.47 kg/m²       REVIEW OF SYSTEMS:  General: denies chronic fatigue, weight loss, fever, anemia, bruising, depression, nervousness, panic attacks  HEENT: denies ringing in ears, ear infections, dizzy spells, poor vision, glaucoma, sinus trouble, hoarseness, eye infections  GI: denies diarrhea, gas, bloating, heartburn, regurgitation, difficulty swallowing, painful swallowing, nausea, vomiting, constipation, abdominal pain, decreased appetite, blood in stools, black stools, jaundice, dark urine  Lungs: denies pneumonia, asthma, cough, SOB, hemoptysis  Heart: denies chest pain, irregular heart beat, ankle swelling, HTN  Skin: denies rashes, hives, allergic reaction  Urinary: denies UTI, kidney stones, decreased urine force and flow, urination at night, blood in urine, painful urination  Bones and Joints: denies arthritis, rheumatism, back pain, gout, osteoporosis  Neurologic: denies stroke, seizures, headaches, numbness, tingling       Physical Exam:     Juliano Handy  is a 39 y. o. male who is pleasant, alert and oriented x3, in no apparent distress, and looks their given age. Patient is well-developed and nourished, with good attention to hygiene and body habitus. Mood and affect normal, appropriate to situation.       Lower Extremity Exam: .      Incision site is intact with sutures holding. No erythema, drainage, or malodor noted, now no remaining edema present.     VASCULAR EXAM:   Pedal pulses: intact 2/4 D/P and P/T.  Skin temperature is warm to warm right and left foot. Digital capillary fill time is 3sec right and left foot.      Neurological Exam:   Light touch protective sensation is absent to both feet. There is noted Loss of protective sensation. There are no Tinel's or 's signs present to the nerves crossing the ankle joint.     MUSCULOSKELETAL EXAM:.   Muscle tone is normal for age and situation. Muscle strength of the flexor and extensor group inversion and eversion Bilateral 5/5.      MYCOTIC NAIL:.   Dystrophic nail 1,2,3,4,5 bilateral. Elongated thickened nails 1,2,3,4,5 bilateral Hypertrophic nails 1,2,3,4,5     DERMATOLOGICAL EXAM:.   Skin is of abnormal texture and turgor with some atrophic skin changes noting decreased hair growth, nail changes (thickening), pigmentary changes, skin texture (thin,shiney), skin color (rubor, red) . R/L Bilateral. There is diffuse xerosis.  There is noted subungual debris           Wound Foot Right;Plantar;Medial (Active)   Dressing Status  Clean, dry, and intact 5/31/2019  7:30 PM   Number of days: 229       Wound Foot Right;Plantar;Lateral (Active)   Dressing Status  Clean, dry, and intact 5/31/2019  7:30 PM   Number of days: 229 Wound Foot Right (Active)   Dressing Status Clean, dry, and intact 6/3/2019 11:00 PM   Dressing Type Elastic bandage 6/3/2019 11:00 PM   Incision Site Well Approximated Yes 6/2/2019 11:00 AM   Number of days: 2        Labs  Recent Results (from the past 24 hour(s))   GLUCOSE, POC    Collection Time: 06/14/19 11:37 AM   Result Value Ref Range    Glucose (POC) 335 (H) 70 - 110 mg/dL   GLUCOSE, POC    Collection Time: 06/14/19 11:39 AM   Result Value Ref Range    Glucose (POC) 293 (H) 70 - 110 mg/dL   GLUCOSE, POC    Collection Time: 06/14/19  4:42 PM   Result Value Ref Range    Glucose (POC) 239 (H) 70 - 110 mg/dL   GLUCOSE, POC    Collection Time: 06/14/19  9:13 PM   Result Value Ref Range    Glucose (POC) 139 (H) 70 - 110 mg/dL   GLUCOSE, POC    Collection Time: 06/15/19  5:44 AM   Result Value Ref Range    Glucose (POC) 259 (H) 70 - 110 mg/dL     Intra op Cx: Full report on chart    No anaerobes isolated 2 days    Component Value Flag Ref Range Units Status   Special Requests:     Preliminary   BONE AND TISSUE RIGHT FOOT 5TH METATARSAL HEAD    GRAM STAIN RARE WBC'S      Preliminary   GRAM STAIN NO ORGANISMS SEEN      Preliminary   Culture result:      Preliminary   NO GROWTH AFTER 16 HOURS        RADIOGRAPHIC FINDINGS:.  (see report for details)  lytic changes of the fifth distal metatarsal compatible with  osteomyelitis     Procedures:    1.  Right foot fifth metatarsal head resection along with base of the proximal phalanx. 2.  Second pass bone biopsies of the fifth metatarsal and proximal phalanx respectively. 3.  Flap closure.  POD #2                 KACIE Hall DPM  Deanne 15, 2019

## 2019-06-16 LAB
GLUCOSE BLD STRIP.AUTO-MCNC: 152 MG/DL (ref 70–110)
GLUCOSE BLD STRIP.AUTO-MCNC: 223 MG/DL (ref 70–110)
GLUCOSE BLD STRIP.AUTO-MCNC: 229 MG/DL (ref 70–110)
GLUCOSE BLD STRIP.AUTO-MCNC: 310 MG/DL (ref 70–110)
GLUCOSE BLD STRIP.AUTO-MCNC: 42 MG/DL (ref 70–110)

## 2019-06-16 PROCEDURE — 74011250636 HC RX REV CODE- 250/636: Performed by: NURSE PRACTITIONER

## 2019-06-16 PROCEDURE — 74011250637 HC RX REV CODE- 250/637: Performed by: NURSE PRACTITIONER

## 2019-06-16 PROCEDURE — 74011000258 HC RX REV CODE- 258: Performed by: NURSE PRACTITIONER

## 2019-06-16 PROCEDURE — 74011250637 HC RX REV CODE- 250/637: Performed by: INTERNAL MEDICINE

## 2019-06-16 PROCEDURE — 74011636637 HC RX REV CODE- 636/637: Performed by: INTERNAL MEDICINE

## 2019-06-16 RX ADMIN — Medication 2 TABLET: at 17:55

## 2019-06-16 RX ADMIN — INSULIN LISPRO 4 UNITS: 100 INJECTION, SOLUTION INTRAVENOUS; SUBCUTANEOUS at 12:00

## 2019-06-16 RX ADMIN — GABAPENTIN 300 MG: 300 CAPSULE ORAL at 16:15

## 2019-06-16 RX ADMIN — DOCUSATE SODIUM 100 MG: 100 CAPSULE, LIQUID FILLED ORAL at 09:23

## 2019-06-16 RX ADMIN — Medication 2 TABLET: at 09:22

## 2019-06-16 RX ADMIN — INSULIN GLARGINE 14 UNITS: 100 INJECTION, SOLUTION SUBCUTANEOUS at 09:23

## 2019-06-16 RX ADMIN — INSULIN LISPRO 4 UNITS: 100 INJECTION, SOLUTION INTRAVENOUS; SUBCUTANEOUS at 16:14

## 2019-06-16 RX ADMIN — Medication 1 TABLET: at 09:23

## 2019-06-16 RX ADMIN — ASPIRIN 81 MG 81 MG: 81 TABLET ORAL at 09:23

## 2019-06-16 RX ADMIN — SIMVASTATIN 40 MG: 40 TABLET, FILM COATED ORAL at 21:28

## 2019-06-16 RX ADMIN — INSULIN LISPRO 4 UNITS: 100 INJECTION, SOLUTION INTRAVENOUS; SUBCUTANEOUS at 16:23

## 2019-06-16 RX ADMIN — CEFTRIAXONE SODIUM 2 G: 2 INJECTION, POWDER, FOR SOLUTION INTRAMUSCULAR; INTRAVENOUS at 19:16

## 2019-06-16 RX ADMIN — Medication 1 TABLET: at 17:15

## 2019-06-16 RX ADMIN — INSULIN GLARGINE 14 UNITS: 100 INJECTION, SOLUTION SUBCUTANEOUS at 21:29

## 2019-06-16 RX ADMIN — INSULIN LISPRO 4 UNITS: 100 INJECTION, SOLUTION INTRAVENOUS; SUBCUTANEOUS at 09:25

## 2019-06-16 RX ADMIN — GABAPENTIN 300 MG: 300 CAPSULE ORAL at 09:22

## 2019-06-16 RX ADMIN — GABAPENTIN 300 MG: 300 CAPSULE ORAL at 21:28

## 2019-06-16 NOTE — ROUTINE PROCESS
Bedside, Verbal and Written shift change report given to kathi steiner (oncoming nurse) by Derrick Winkler (offgoing nurse). Report included the following information SBAR, Intake/Output and Recent Results.

## 2019-06-17 LAB
ANION GAP SERPL CALC-SCNC: 7 MMOL/L (ref 3–18)
BASOPHILS # BLD: 0.1 K/UL (ref 0–0.1)
BASOPHILS NFR BLD: 2 % (ref 0–2)
BUN SERPL-MCNC: 18 MG/DL (ref 7–18)
BUN/CREAT SERPL: 17 (ref 12–20)
CALCIUM SERPL-MCNC: 9 MG/DL (ref 8.5–10.1)
CHLORIDE SERPL-SCNC: 102 MMOL/L (ref 100–108)
CO2 SERPL-SCNC: 30 MMOL/L (ref 21–32)
CREAT SERPL-MCNC: 1.09 MG/DL (ref 0.6–1.3)
DIFFERENTIAL METHOD BLD: ABNORMAL
EOSINOPHIL # BLD: 0.2 K/UL (ref 0–0.4)
EOSINOPHIL NFR BLD: 6 % (ref 0–5)
ERYTHROCYTE [DISTWIDTH] IN BLOOD BY AUTOMATED COUNT: 12.8 % (ref 11.6–14.5)
GLUCOSE BLD STRIP.AUTO-MCNC: 176 MG/DL (ref 70–110)
GLUCOSE BLD STRIP.AUTO-MCNC: 204 MG/DL (ref 70–110)
GLUCOSE BLD STRIP.AUTO-MCNC: 205 MG/DL (ref 70–110)
GLUCOSE BLD STRIP.AUTO-MCNC: 337 MG/DL (ref 70–110)
GLUCOSE BLD STRIP.AUTO-MCNC: 343 MG/DL (ref 70–110)
GLUCOSE BLD STRIP.AUTO-MCNC: 99 MG/DL (ref 70–110)
GLUCOSE SERPL-MCNC: 89 MG/DL (ref 74–99)
HCT VFR BLD AUTO: 32.9 % (ref 36–48)
HGB BLD-MCNC: 10.3 G/DL (ref 13–16)
LYMPHOCYTES # BLD: 1.6 K/UL (ref 0.9–3.6)
LYMPHOCYTES NFR BLD: 49 % (ref 21–52)
MCH RBC QN AUTO: 28.9 PG (ref 24–34)
MCHC RBC AUTO-ENTMCNC: 31.3 G/DL (ref 31–37)
MCV RBC AUTO: 92.4 FL (ref 74–97)
MONOCYTES # BLD: 0.3 K/UL (ref 0.05–1.2)
MONOCYTES NFR BLD: 8 % (ref 3–10)
NEUTS SEG # BLD: 1.1 K/UL (ref 1.8–8)
NEUTS SEG NFR BLD: 35 % (ref 40–73)
PLATELET # BLD AUTO: 265 K/UL (ref 135–420)
PMV BLD AUTO: 10.9 FL (ref 9.2–11.8)
POTASSIUM SERPL-SCNC: 4 MMOL/L (ref 3.5–5.5)
RBC # BLD AUTO: 3.56 M/UL (ref 4.7–5.5)
SODIUM SERPL-SCNC: 139 MMOL/L (ref 136–145)
WBC # BLD AUTO: 3.2 K/UL (ref 4.6–13.2)

## 2019-06-17 PROCEDURE — 74011250637 HC RX REV CODE- 250/637: Performed by: INTERNAL MEDICINE

## 2019-06-17 PROCEDURE — 74011636637 HC RX REV CODE- 636/637: Performed by: INTERNAL MEDICINE

## 2019-06-17 PROCEDURE — 36592 COLLECT BLOOD FROM PICC: CPT

## 2019-06-17 PROCEDURE — 74011250637 HC RX REV CODE- 250/637: Performed by: NURSE PRACTITIONER

## 2019-06-17 PROCEDURE — 82962 GLUCOSE BLOOD TEST: CPT

## 2019-06-17 PROCEDURE — 74011000258 HC RX REV CODE- 258: Performed by: NURSE PRACTITIONER

## 2019-06-17 PROCEDURE — 80048 BASIC METABOLIC PNL TOTAL CA: CPT

## 2019-06-17 PROCEDURE — 85025 COMPLETE CBC W/AUTO DIFF WBC: CPT

## 2019-06-17 PROCEDURE — 74011250636 HC RX REV CODE- 250/636: Performed by: NURSE PRACTITIONER

## 2019-06-17 RX ADMIN — INSULIN GLARGINE 14 UNITS: 100 INJECTION, SOLUTION SUBCUTANEOUS at 22:02

## 2019-06-17 RX ADMIN — DOCUSATE SODIUM 100 MG: 100 CAPSULE, LIQUID FILLED ORAL at 10:07

## 2019-06-17 RX ADMIN — Medication 2 TABLET: at 10:07

## 2019-06-17 RX ADMIN — INSULIN LISPRO 4 UNITS: 100 INJECTION, SOLUTION INTRAVENOUS; SUBCUTANEOUS at 17:35

## 2019-06-17 RX ADMIN — GABAPENTIN 300 MG: 300 CAPSULE ORAL at 10:07

## 2019-06-17 RX ADMIN — Medication 1 TABLET: at 17:35

## 2019-06-17 RX ADMIN — INSULIN GLARGINE 14 UNITS: 100 INJECTION, SOLUTION SUBCUTANEOUS at 10:04

## 2019-06-17 RX ADMIN — INSULIN LISPRO 6 UNITS: 100 INJECTION, SOLUTION INTRAVENOUS; SUBCUTANEOUS at 17:36

## 2019-06-17 RX ADMIN — INSULIN LISPRO 12 UNITS: 100 INJECTION, SOLUTION INTRAVENOUS; SUBCUTANEOUS at 12:25

## 2019-06-17 RX ADMIN — Medication 1 TABLET: at 10:07

## 2019-06-17 RX ADMIN — GABAPENTIN 300 MG: 300 CAPSULE ORAL at 17:35

## 2019-06-17 RX ADMIN — ASPIRIN 81 MG 81 MG: 81 TABLET ORAL at 10:07

## 2019-06-17 RX ADMIN — INSULIN LISPRO 4 UNITS: 100 INJECTION, SOLUTION INTRAVENOUS; SUBCUTANEOUS at 12:24

## 2019-06-17 RX ADMIN — SIMVASTATIN 40 MG: 40 TABLET, FILM COATED ORAL at 22:02

## 2019-06-17 RX ADMIN — Medication 2 TABLET: at 17:35

## 2019-06-17 RX ADMIN — CEFTRIAXONE SODIUM 2 G: 2 INJECTION, POWDER, FOR SOLUTION INTRAMUSCULAR; INTRAVENOUS at 19:19

## 2019-06-17 RX ADMIN — GABAPENTIN 300 MG: 300 CAPSULE ORAL at 22:02

## 2019-06-17 NOTE — PROGRESS NOTES
I have reviewed this patient's current medication list and recent laboratory results. At this time, I do not suggest any drug therapy adjustments or additional laboratory monitoring. Thank you,  Luis CUMMINS  Ph. M. S.  6/17/2019

## 2019-06-18 LAB
GLUCOSE BLD STRIP.AUTO-MCNC: 125 MG/DL (ref 70–110)
GLUCOSE BLD STRIP.AUTO-MCNC: 189 MG/DL (ref 70–110)
GLUCOSE BLD STRIP.AUTO-MCNC: 238 MG/DL (ref 70–110)
GLUCOSE BLD STRIP.AUTO-MCNC: 311 MG/DL (ref 70–110)

## 2019-06-18 PROCEDURE — 82962 GLUCOSE BLOOD TEST: CPT

## 2019-06-18 PROCEDURE — 74011250636 HC RX REV CODE- 250/636: Performed by: NURSE PRACTITIONER

## 2019-06-18 PROCEDURE — 74011000258 HC RX REV CODE- 258: Performed by: NURSE PRACTITIONER

## 2019-06-18 PROCEDURE — 77030011256 HC DRSG MEPILEX <16IN NO BORD MOLN -A

## 2019-06-18 PROCEDURE — 74011250637 HC RX REV CODE- 250/637: Performed by: INTERNAL MEDICINE

## 2019-06-18 PROCEDURE — 74011636637 HC RX REV CODE- 636/637: Performed by: INTERNAL MEDICINE

## 2019-06-18 PROCEDURE — 74011250637 HC RX REV CODE- 250/637: Performed by: NURSE PRACTITIONER

## 2019-06-18 PROCEDURE — 74011636637 HC RX REV CODE- 636/637: Performed by: NURSE PRACTITIONER

## 2019-06-18 RX ORDER — INSULIN GLARGINE 100 [IU]/ML
15 INJECTION, SOLUTION SUBCUTANEOUS 2 TIMES DAILY
Status: DISCONTINUED | OUTPATIENT
Start: 2019-06-18 | End: 2019-06-28 | Stop reason: HOSPADM

## 2019-06-18 RX ORDER — INSULIN LISPRO 100 [IU]/ML
6 INJECTION, SOLUTION INTRAVENOUS; SUBCUTANEOUS
Status: DISCONTINUED | OUTPATIENT
Start: 2019-06-18 | End: 2019-06-28

## 2019-06-18 RX ADMIN — INSULIN LISPRO 6 UNITS: 100 INJECTION, SOLUTION INTRAVENOUS; SUBCUTANEOUS at 11:49

## 2019-06-18 RX ADMIN — GABAPENTIN 300 MG: 300 CAPSULE ORAL at 16:26

## 2019-06-18 RX ADMIN — INSULIN LISPRO 6 UNITS: 100 INJECTION, SOLUTION INTRAVENOUS; SUBCUTANEOUS at 08:21

## 2019-06-18 RX ADMIN — GABAPENTIN 300 MG: 300 CAPSULE ORAL at 08:20

## 2019-06-18 RX ADMIN — CEFTRIAXONE SODIUM 2 G: 2 INJECTION, POWDER, FOR SOLUTION INTRAMUSCULAR; INTRAVENOUS at 18:56

## 2019-06-18 RX ADMIN — SIMVASTATIN 40 MG: 40 TABLET, FILM COATED ORAL at 21:54

## 2019-06-18 RX ADMIN — Medication 1 TABLET: at 08:20

## 2019-06-18 RX ADMIN — INSULIN GLARGINE 14 UNITS: 100 INJECTION, SOLUTION SUBCUTANEOUS at 08:20

## 2019-06-18 RX ADMIN — INSULIN GLARGINE 15 UNITS: 100 INJECTION, SOLUTION SUBCUTANEOUS at 21:44

## 2019-06-18 RX ADMIN — Medication 1 TABLET: at 18:57

## 2019-06-18 RX ADMIN — ASPIRIN 81 MG 81 MG: 81 TABLET ORAL at 08:20

## 2019-06-18 RX ADMIN — INSULIN LISPRO 6 UNITS: 100 INJECTION, SOLUTION INTRAVENOUS; SUBCUTANEOUS at 16:57

## 2019-06-18 RX ADMIN — Medication 2 TABLET: at 08:19

## 2019-06-18 RX ADMIN — INSULIN LISPRO 3 UNITS: 100 INJECTION, SOLUTION INTRAVENOUS; SUBCUTANEOUS at 16:57

## 2019-06-18 RX ADMIN — INSULIN LISPRO 4 UNITS: 100 INJECTION, SOLUTION INTRAVENOUS; SUBCUTANEOUS at 08:20

## 2019-06-18 RX ADMIN — Medication 2 TABLET: at 18:57

## 2019-06-18 RX ADMIN — GABAPENTIN 300 MG: 300 CAPSULE ORAL at 22:00

## 2019-06-18 RX ADMIN — DOCUSATE SODIUM 100 MG: 100 CAPSULE, LIQUID FILLED ORAL at 08:20

## 2019-06-18 NOTE — PROGRESS NOTES
[facility]    Daily progress Note    Patient: Sabi [place] MRN: [id number]  CSN: [id number]    YOB: 1975  Age: 39 y.o. Sex: male    DOA: 6/6/2019 LOS:  LOS: 12 days                    Subjective:     CC: follow up BS  Mr. Ruiz Jiang is a 39 yr old  Male patient. Patient recently hospitalized from 5/29-6/6.  Patient was seen in the Er for eval of chronic right lateral plantar ulcer. Patient was found to have new dorsolateral right foot OM on XR. MRI then confirm OM. He was seen by podiatry and they had   resection 5th metatarsal head along with base of prox phalanx, second pass bone biopsies of the 5th metatarsal and proximal phalanx, and flap closure on 6/2. Second pass prox phalanx bone biopsy findings were positive for chronic osteomyelitis.  He was seen by ID and they recommended 6 weeks of IV Rocephin vis PICC line. His BC did come back + for group C Streptococcus on 5/29, per ID possibel contaminant vs from OM wound infection,, continue ivabx. Patient improved and sent to [facility] for rehab.     6/18/2019  Since admitted noted hypotensive episode, bp in the 80s. he is alert and verbal, he denies any nvd, fever or chills. He BP is stable today. Patient report having bm, no issues. He report he tolerating Pt/ot.  Appetiet is good, BS reviewed improving.        PAST MEDICAL HISTORY Insulin-dependent diabetes, hypertension.     PAST SURGICAL HISTORY:  Surgery on the left leg for fracture.     ALLERGIES:  TO IV CONTRAST.     CURRENT MEDICATIONS:  Medication list reviewed.     SOCIAL HISTORY: Jaimie Brownlee admits to smoking and drinking occasionally.     FAMILY HISTORY:  Positive for diabetes.     REVIEW OF SYSTEMS:  No fever or chills.  No weight loss or headache.  No chest pain or palpitation.  No shortness of breath or cough.  No nausea, vomiting, diarrhea.  No dysuria or polyuria.  No back pain or leg pain.  No heat or cold intolerance.  No anxiety or depression.             Objective:      Visit Vitals  /68   Pulse 87   Temp 97.7 °F (36.5 °C)   Resp 20   Wt 70.1 kg (154 lb 9.6 oz)   SpO2 96%   BMI 24.95 kg/m²       Physical Exam:  General appearance: alert, cooperative, no distress, appears stated age  [de-identified]: negative  Neck: supple, symmetrical, trachea midline, no adenopathy, thyroid: not enlarged, symmetric, no tenderness/mass/nodules, no carotid bruit and no JVD  Lungs: clear to auscultation bilaterally  Heart: regular rate and rhythm, S1, S2 normal, no murmur, click, rub or gallop  Abdomen: soft, non-tender. Bowel sounds normal. No masses,  no organomegaly  Pulses: 2+ and symmetric  Skin:  Wound to rle  Intake and Output:  Current Shift:  No intake/output data recorded.   Last three shifts:  06/16 1901 - 06/18 0700  In: -   Out: 400 [Urine:400]    Recent Results (from the past 24 hour(s))   GLUCOSE, POC    Collection Time: 06/17/19 12:20 PM   Result Value Ref Range    Glucose (POC) 337 (H) 70 - 110 mg/dL   GLUCOSE, POC    Collection Time: 06/17/19 12:23 PM   Result Value Ref Range    Glucose (POC) 343 (H) 70 - 110 mg/dL   GLUCOSE, POC    Collection Time: 06/17/19  4:55 PM   Result Value Ref Range    Glucose (POC) 205 (H) 70 - 110 mg/dL   GLUCOSE, POC    Collection Time: 06/17/19 10:04 PM   Result Value Ref Range    Glucose (POC) 204 (H) 70 - 110 mg/dL   GLUCOSE, POC    Collection Time: 06/18/19  6:12 AM   Result Value Ref Range    Glucose (POC) 238 (H) 70 - 110 mg/dL   GLUCOSE, POC    Collection Time: 06/18/19 11:48 AM   Result Value Ref Range    Glucose (POC) 125 (H) 70 - 110 mg/dL         Current Facility-Administered Medications:     insulin lispro (HUMALOG) injection 6 Units, 6 Units, SubCUTAneous, TID WITH MEALS, Beth Liao NP, 6 Units at 06/18/19 1149    insulin glargine (LANTUS) injection 15 Units, 15 Units, SubCUTAneous, BID, Beth Liao NP    docusate sodium (COLACE) capsule 100 mg, 100 mg, Oral, DAILY, Beth Liao, NP, 100 mg at 06/18/19 5501    Lactobacillus Acidoph & [facility]) tablet 2 Tab, 2 Tab, Oral, BID, Sy ARNOLD NP, 2 Tab at 06/18/19 3272    insulin lispro (HUMALOG) injection, , SubCUTAneous, TID WITH MEALS, Nabeel Hobbs MD, Stopped at 06/18/19 1149    magnesium hydroxide (MILK OF MAGNESIA) 400 mg/5 mL oral suspension 30 mL, 30 mL, Oral, DAILY PRN, Nabeel Hobbs MD, 30 mL at 06/07/19 0542    bisacodyl (DULCOLAX) suppository 10 mg, 10 mg, Rectal, DAILY PRN, Nabeel Hobbs MD    cefTRIAXone (ROCEPHIN) 2 g in 0.9% sodium chloride (MBP/ADV) 50 mL MBP, 2 g, IntraVENous, Q24H, Beth Liao NP, Stopped at 06/17/19 1949    calcium-vitamin D 600 mg(1,500mg) -200 unit per tablet 1 Tab, 1 Tab, Oral, BID WITH MEALS, Nabeel Hobbs MD, 1 Tab at 06/18/19 0820    gabapentin (NEURONTIN) capsule 300 mg, 300 mg, Oral, TID, Nabeel Hobbs MD, 300 mg at 06/18/19 0820    simvastatin (ZOCOR) tablet 40 mg, 40 mg, Oral, QHS, Nabeel Hobbs MD, 40 mg at 06/17/19 2202    aspirin chewable tablet 81 mg, 81 mg, Oral, DAILY, Nabeel Hobbs MD, 81 mg at 06/18/19 0820    glucagon (GLUCAGEN) injection 1 mg, 1 mg, IntraMUSCular, PRN, Nabeel Hobbs MD    glucose chewable tablet 16 g, 4 Tab, Oral, PRN, Nabeel Hobbs MD    dextrose 10 % infusion 125-250 mL, 125-250 mL, IntraVENous, PRN, Nabeel Hobbs MD    Lab Results   Component Value Date/Time    Glucose 89 06/17/2019 03:50 AM    Glucose 252 (H) 06/13/2019 01:05 AM    Glucose 339 (H) 06/10/2019 04:05 AM    Glucose 167 (H) 06/03/2019 04:38 AM    Glucose 188 (H) 06/02/2019 04:30 AM        Assessment/Plan   New dorsolateral right foot OM:  patient is s/p resection 5th metatarsal head along with base of prox phalanx, second pass bone biopsies of the 5th metatarsal and proximal phalanx, and flap closure on 6/2. Patient to follow up with [facility]. Continue Ivbx per ID recommendation or 6 weeks via PICC line.  Continue wound care     DM type 2:  hgba1c is 8.5 , BS not controlled, continue Lantus to 15 units bid and meal insulin 6 units tid, will continue monitor for hypoglycemia, encourage bedtime snack.      Hypotension episode probable from dehydration - he is not on any bp meds, he is asymptomatic. received  ivf times 2 days ago, encourage to increase po fluid intake and monitor     Hyperlipidemia: continue Zocor     Left arm weakness: noted from previous neck injury.      Constipation : resolved continue bowel regiment and monitor      Russ Foley NP  6/18/2019, 11:58 AM

## 2019-06-18 NOTE — ROUTINE PROCESS
Bedside, Verbal and Written shift change report given to suzanne steiner (oncoming nurse) by Hope Ruiz (offgoing nurse). Report included the following information SBAR, Intake/Output and MAR.

## 2019-06-18 NOTE — ROUTINE PROCESS
Bedside and Verbal shift change report given to Nik Fenton LPN (oncoming nurse) by Lawrence Muniz LPN (offgoing nurse). Report included the following information SBAR, Kardex and MAR.

## 2019-06-18 NOTE — ROUTINE PROCESS
Pt alert and oriented x4, Pt able to make needs known. Pt sitting in recliner, drawing in sketchpad. Mother at beside. Pt has no complaints at this time. Pt has no obvious signs of distress at this time. Pt. Denies any weakness, dizziness, or any other symptoms. Call bell within reach.

## 2019-06-18 NOTE — PROGRESS NOTES
conducted a Follow up consultation and Spiritual Assessment for Primitivo Pedroza, who is a 39 y.o.,male. The  provided the following Interventions:  Continued the relationship of care and support. Listened empathically. Offered prayer and assurance of continued prayer on patients behalf. Chart reviewed. The following outcomes were achieved:  Patient expressed gratitude for pastoral care visit. Assessment:  There are no further spiritual or Pentecostal issues which require Spiritual Care Services interventions at this time. Plan:  Chaplains will continue to follow and will provide pastoral care on an as needed/requested basis.  recommends bedside caregivers page  on duty if patient shows signs of acute spiritual or emotional distress.      [facility]   Staff 333 [facility]   [phone]

## 2019-06-19 LAB
GLUCOSE BLD STRIP.AUTO-MCNC: 123 MG/DL (ref 70–110)
GLUCOSE BLD STRIP.AUTO-MCNC: 134 MG/DL (ref 70–110)
GLUCOSE BLD STRIP.AUTO-MCNC: 95 MG/DL (ref 70–110)

## 2019-06-19 PROCEDURE — 74011000258 HC RX REV CODE- 258: Performed by: NURSE PRACTITIONER

## 2019-06-19 PROCEDURE — 74011250636 HC RX REV CODE- 250/636: Performed by: NURSE PRACTITIONER

## 2019-06-19 PROCEDURE — 74011250637 HC RX REV CODE- 250/637: Performed by: INTERNAL MEDICINE

## 2019-06-19 PROCEDURE — 74011636637 HC RX REV CODE- 636/637: Performed by: NURSE PRACTITIONER

## 2019-06-19 PROCEDURE — 74011250637 HC RX REV CODE- 250/637: Performed by: NURSE PRACTITIONER

## 2019-06-19 PROCEDURE — 82962 GLUCOSE BLOOD TEST: CPT

## 2019-06-19 RX ADMIN — GABAPENTIN 300 MG: 300 CAPSULE ORAL at 08:52

## 2019-06-19 RX ADMIN — CEFTRIAXONE SODIUM 2 G: 2 INJECTION, POWDER, FOR SOLUTION INTRAMUSCULAR; INTRAVENOUS at 18:36

## 2019-06-19 RX ADMIN — ASPIRIN 81 MG 81 MG: 81 TABLET ORAL at 08:52

## 2019-06-19 RX ADMIN — GABAPENTIN 300 MG: 300 CAPSULE ORAL at 21:55

## 2019-06-19 RX ADMIN — INSULIN GLARGINE 15 UNITS: 100 INJECTION, SOLUTION SUBCUTANEOUS at 08:52

## 2019-06-19 RX ADMIN — Medication 1 TABLET: at 17:37

## 2019-06-19 RX ADMIN — INSULIN GLARGINE 15 UNITS: 100 INJECTION, SOLUTION SUBCUTANEOUS at 21:18

## 2019-06-19 RX ADMIN — INSULIN LISPRO 6 UNITS: 100 INJECTION, SOLUTION INTRAVENOUS; SUBCUTANEOUS at 08:52

## 2019-06-19 RX ADMIN — Medication 2 TABLET: at 18:37

## 2019-06-19 RX ADMIN — Medication 1 TABLET: at 08:51

## 2019-06-19 RX ADMIN — Medication 2 TABLET: at 08:51

## 2019-06-19 RX ADMIN — GABAPENTIN 300 MG: 300 CAPSULE ORAL at 16:37

## 2019-06-19 RX ADMIN — SIMVASTATIN 40 MG: 40 TABLET, FILM COATED ORAL at 21:55

## 2019-06-19 NOTE — ROUTINE PROCESS
Bedside and Verbal shift change report given to BERNIE Chadwick RN (oncoming nurse) by MARIA G Monte RN (offgoing nurse). Report included the following information SBAR, Kardex and MAR.

## 2019-06-19 NOTE — ROUTINE PROCESS
Pt returned from [facility] at 1430. Pt states he ate lunch and administered his insulin. Blood glucose 96 at 1435.

## 2019-06-20 ENCOUNTER — PATIENT OUTREACH (OUTPATIENT)
Dept: CASE MANAGEMENT | Age: 44
End: 2019-06-20

## 2019-06-20 LAB
GLUCOSE BLD STRIP.AUTO-MCNC: 129 MG/DL (ref 70–110)
GLUCOSE BLD STRIP.AUTO-MCNC: 217 MG/DL (ref 70–110)
GLUCOSE BLD STRIP.AUTO-MCNC: 306 MG/DL (ref 70–110)
GLUCOSE BLD STRIP.AUTO-MCNC: 327 MG/DL (ref 70–110)
GLUCOSE BLD STRIP.AUTO-MCNC: 460 MG/DL (ref 70–110)

## 2019-06-20 PROCEDURE — 74011250637 HC RX REV CODE- 250/637: Performed by: INTERNAL MEDICINE

## 2019-06-20 PROCEDURE — 74011250637 HC RX REV CODE- 250/637: Performed by: NURSE PRACTITIONER

## 2019-06-20 PROCEDURE — 74011000258 HC RX REV CODE- 258: Performed by: NURSE PRACTITIONER

## 2019-06-20 PROCEDURE — 74011250636 HC RX REV CODE- 250/636: Performed by: NURSE PRACTITIONER

## 2019-06-20 PROCEDURE — 74011636637 HC RX REV CODE- 636/637: Performed by: NURSE PRACTITIONER

## 2019-06-20 PROCEDURE — 82962 GLUCOSE BLOOD TEST: CPT

## 2019-06-20 PROCEDURE — 74011636637 HC RX REV CODE- 636/637: Performed by: INTERNAL MEDICINE

## 2019-06-20 RX ORDER — INSULIN LISPRO 100 [IU]/ML
INJECTION, SOLUTION INTRAVENOUS; SUBCUTANEOUS
Status: DISCONTINUED | OUTPATIENT
Start: 2019-06-20 | End: 2019-06-28 | Stop reason: HOSPADM

## 2019-06-20 RX ADMIN — INSULIN LISPRO 12 UNITS: 100 INJECTION, SOLUTION INTRAVENOUS; SUBCUTANEOUS at 16:35

## 2019-06-20 RX ADMIN — SIMVASTATIN 40 MG: 40 TABLET, FILM COATED ORAL at 21:55

## 2019-06-20 RX ADMIN — GABAPENTIN 300 MG: 300 CAPSULE ORAL at 16:44

## 2019-06-20 RX ADMIN — CEFTRIAXONE SODIUM 2 G: 2 INJECTION, POWDER, FOR SOLUTION INTRAMUSCULAR; INTRAVENOUS at 18:34

## 2019-06-20 RX ADMIN — Medication 2 TABLET: at 11:05

## 2019-06-20 RX ADMIN — INSULIN LISPRO 6 UNITS: 100 INJECTION, SOLUTION INTRAVENOUS; SUBCUTANEOUS at 08:18

## 2019-06-20 RX ADMIN — GABAPENTIN 300 MG: 300 CAPSULE ORAL at 11:05

## 2019-06-20 RX ADMIN — INSULIN LISPRO 12 UNITS: 100 INJECTION, SOLUTION INTRAVENOUS; SUBCUTANEOUS at 06:30

## 2019-06-20 RX ADMIN — INSULIN GLARGINE 15 UNITS: 100 INJECTION, SOLUTION SUBCUTANEOUS at 08:17

## 2019-06-20 RX ADMIN — INSULIN LISPRO 6 UNITS: 100 INJECTION, SOLUTION INTRAVENOUS; SUBCUTANEOUS at 12:00

## 2019-06-20 RX ADMIN — Medication 1 TABLET: at 11:05

## 2019-06-20 RX ADMIN — DOCUSATE SODIUM 100 MG: 100 CAPSULE, LIQUID FILLED ORAL at 11:05

## 2019-06-20 RX ADMIN — ASPIRIN 81 MG 81 MG: 81 TABLET ORAL at 11:05

## 2019-06-20 RX ADMIN — Medication 2 TABLET: at 18:36

## 2019-06-20 RX ADMIN — INSULIN LISPRO 6 UNITS: 100 INJECTION, SOLUTION INTRAVENOUS; SUBCUTANEOUS at 16:35

## 2019-06-20 RX ADMIN — Medication 1 TABLET: at 18:36

## 2019-06-20 RX ADMIN — INSULIN GLARGINE 15 UNITS: 100 INJECTION, SOLUTION SUBCUTANEOUS at 21:55

## 2019-06-20 RX ADMIN — GABAPENTIN 300 MG: 300 CAPSULE ORAL at 21:55

## 2019-06-20 NOTE — PROGRESS NOTES
Community Care Team Documentation for Patient in [facility]     Patient discharged from  [facility] to [facility], on 6/6/19. Hospital Discharge diagnosis:     1. Sepsis    2. Osteomyelitis of right foot    3. Left arm weakness   4. Right foot ulcer, with necrosis of muscle     SNF Attending Provider:  Dr. Maximino Hart    Anticipated discharge date from SNF:  TBD    PCP : Joshua Myers MD    Nurse Navigator:     [facility] Team rounds completed, updates provided by facility. RRAT:  Low Risk            7       Total Score        3 Has Seen PCP in Last 6 Months (Yes=3, No=0)    4 Charlson Comorbidity Score (Age + Comorbid Conditions)        Criteria that do not apply:    . Living with Significant Other. Assisted Living. LTAC. SNF. or   Rehab    Patient Length of Stay (>5 days = 3)    IP Visits Last 12 Months (1-3=4, 4=9, >4=11)    Pt.  Coverage (Medicare=5 , Medicaid, or Self-Pay=4)        Past Medical History:   Diagnosis Date    Diabetes [facility])          Active Ambulatory Problems     Diagnosis Date Noted    Right foot ulcer, with necrosis of muscle ([address] 10/18/2018    Diabetic polyneuropathy ([address] 10/18/2018    Right foot ulcer, with fat layer exposed ([address] 10/18/2018    Osteomyelitis of right foot ([address] 05/29/2019    Sepsis ([address] 05/29/2019    Left arm weakness 05/29/2019     Resolved Ambulatory Problems     Diagnosis Date Noted    No Resolved Ambulatory Problems     Past Medical History:   Diagnosis Date    Diabetes ([address]

## 2019-06-20 NOTE — PROGRESS NOTES
Nutrition follow-up/Plan of care [facility]      RECOMMENDATIONS:   1. Consistent CHO Diet with HS snack  2. Monitor labs, weight and PO intake  3. RD to follow     GOALS:   1. Met/Ongoing: PO intake meets >75% of protein/calorie needs by 6/27  2. Met/Ongoing: Weight Maintenance (+/- 1-2 lb) by 6/27      ASSESSMENT:   Wt status is classified as normal per Body mass index is 24.95 kg/m². Adequate PO intake per vital in hospital. Labs noted. BG range () over the past 24 hours; A1c (8.5). Glycemic control team is following. Nutrition recommendations listed. RD to follow. Nutrition Risk:  [] High  [] Moderate [x]  Low    SUBJECTIVE/OBJECTIVE:   (6/20): Last BM on (6/17) per I/Os. Noted an 11 lb or 7.7% from admission weight; variability in weights recorded ?? accuracy. Pt seen in room sitting in chair finishing up lunch; observed 100% of meal consumed. Reports he is doing well with his intake but concerned about his high BG readings. Assured him glycemic control has been monitoring and making adjustments. Inquired as to what he had eaten last night d/t hyperglycemia and found to have eaten 2 meals. Had d/w glycemic control and they also went to visit him to explain medications. Will continue to monitor. (6/13): Pt has a good appetite and usually consumes 100% of his meals. Pt seen in room OOB in chair finishing his lunch; observed 95% of meal consumed. Reports his appetite is good and he does snack between meals sometimes. Discussed again the importance of the HS snack and enforced him asking nursing in the evenings as well as if he is feeling symptomatic from hypoglycemia. Also followed up with dietary/nursing to ensure he is receiving it. Pt BG is known to be complicated to manage with large fluctuations in readings; glycemic control team is following and making adjustments as needed. Last BM on (6/12) per I/Os.  Will continue to follow.     (6/7): Pt transferred from  to [facility] on 6/6/2019 after being admitted for sepsis and a right foot ulcer. Pt seen in room OOB in chair later in the day. Reports he has a good appetite and doing well. Provided a menu and encouraged to implement preferences with dietary. Also re ordered HS snack. Will continue to monitor. Below per previous RD note:   (6/4): Admitted with right foot ulcer. Has history of diabetes. S/P right 5th metatarsal head and base of phalanx resection on (6/2). Patient reports having a good appetite and is eating all of meals. 100% intake of lunch meal today and 100% of all meals per vitals. Noted 1313-1196 Kcal diet ordered. Will adjust to 2000 Kcal to meet nutrition needs. States weight has been stable PTA and UBW is between 145-150 lb. No complaints.  Will monitor.      Information Obtained from:    [x] Chart Review   [x] Patient   [] Family/Caregiver   [x] Nurse/Physician   [x] Interdisciplinary Meeting/Rounds      Diet: Consistent CHO  Medications: [x] Reviewed    Allergies: [x] Reviewed   Patient Active Problem List   Diagnosis Code    Right foot ulcer, with necrosis of muscle ([address] L97.513    Diabetic polyneuropathy ([address] E11.42    Right foot ulcer, with fat layer exposed ([address] L97.512    Osteomyelitis of right foot ([address] M86.9    Sepsis ([address] A41.9    Left arm weakness R29.898       Past Medical History:   Diagnosis Date    Diabetes ([address]       Labs:    Lab Results   Component Value Date/Time    Sodium 139 06/17/2019 03:50 AM    Potassium 4.0 06/17/2019 03:50 AM    Chloride 102 06/17/2019 03:50 AM    CO2 30 06/17/2019 03:50 AM    Anion gap 7 06/17/2019 03:50 AM    Glucose 89 06/17/2019 03:50 AM    BUN 18 06/17/2019 03:50 AM    Creatinine 1.09 06/17/2019 03:50 AM    Calcium 9.0 06/17/2019 03:50 AM    Magnesium 2.3 06/03/2019 04:38 AM    Phosphorus 4.0 06/03/2019 04:38 AM    Albumin 3.0 (L) 05/30/2019 05:30 AM     Anthropometrics: BMI (calculated): 25  Last 3 Recorded Weights in this Encounter    06/06/19 2318 06/11/19 1346 06/17/19 8491 Weight: 64.9 kg (143 lb) 66 kg (145 lb 6.4 oz) 70.1 kg (154 lb 9.6 oz)      Ht Readings from Last 1 Encounters:   05/29/19 5' 6\" (1.676 m)     Weight Metrics 6/17/2019 6/3/2019 2/22/2013   Weight 154 lb 9.6 oz 145 lb 4.8 oz 150 lb   BMI 24.95 kg/m2 23.45 kg/m2 23.49 kg/m2       No data found. UBW: 145-150 lb   [] Weight Loss  [x] Weight Gain (11 lb or 7.7% from admission weight; variability in weights recorded ? ?)  [] Weight Stable PTA    Estimated Nutrition Needs: [x] MSJ  [] Other:  Calories: 1928 kcal Based on:   [x] Actual BW    Protein:   78 g Based on:   [x] Actual BW    Fluid:       2000 ml Based on:   [x] Actual BW       Nutrition Problems Identified:   [] Suboptimal PO intake   [] Food Allergies  [] Difficulty chewing/swallowing/poor dentition  [x] Constipation/Diarrhea (Last BM on 6/17; bowel regimen in place)   [] Nausea/Vomiting   [] None  [x] Other: Wound healing    Plan:   [x] Therapeutic Diet  [x]  Obtained/adjusted food preferences/tolerances and/or snacks options   []  Supplements added   [] Occupational therapy following for feeding techniques  [x]  HS snack added   []  Modify diet texture   []  Modify diet for food allergies   [x]  Educate patient (completed by glycemic control)   []  Assist with menu selection   [x]  Monitor PO intake on meal rounds   [x]  Continue inpatient monitoring and intervention   [x]  Participated in discharge planning/Interdisciplinary rounds/Team meetings   []  Other:     Education Needs:   [] Not appropriate for teaching at this time due to:   [x] Identified and addressed    Nutrition Monitoring and Evaluation:  [x] Continue ongoing monitoring and intervention  [] Other    Vergia Harp

## 2019-06-20 NOTE — CONSULTS
Infectious Disease Consultation Note    Date of Admission: 6/6/2019    Date of Consultation: 6/20/2019    Referred by: Dr. Yessi Samano       Reason for Referral: antimicrobial management R foot osteomyelitis       Current Antimicrobials: Prior Antimicrobials   Ceftriaxone 6/4 - 16  Post amp 6/2 - 18  Vancom, zosyn 5/29 - 6       Assessment / Plan:      Osteomyelitis, right 5th metatarsal  - acute.  Secondary to new dorsolateral ulcer  - xray 5/29: lytic changes distal 5th metatarsal  - MRI 5/31: OM distal 5th MT, base 5th prox phalanx, septic arthritis 5th MTP  - s/p Resxn R 5th MTH, base prox phalanx. 2nd pass bx 5th MT, flap closure 6/2  cx NGTD  - 2nd pass prox phalanx: chronic osteomyelitis  - Podiatry following -> continue Ceftriaxone 2 gm IV q 24 x 6 weeks (end date: 7/14/2019). Can continue at OP infusion if necessary  -> continue non-weight bearing per Podiatry     Positive blood culture GC Strep  - 1 of 2 blcx 5/29 Group C Streptococcus  - unclear significance (contaminant vs from OM wound infection)   - covered by current abx    Chronic non-healing ulcer, lateral plantar, R foot     DM     HTN        Microbiology:   5/29      blcx IP x2     Lines / Catheters:   R PICC      HPI:     39year-old -American male with DM, HTN chronic R lateral plantar ulcer initially admitted to [facility] for a new dorsolateral R foot ulcer and OM 5th MT and transferred to [facility] 6/6 for further IV antibiotic therapy. Blood cultures grew group C Streptococci in one of two sets on 5/29. He had resection R 5th MTH, base proximal phalanx, 2nd pass bx 5th MT, and flap closure on 6/2 bone culture was no growth. 2nd pass prox phalanx showed chronic osteomyelitis. He received six days of vancomycin and zosyn and was started on Ceftriaxone 6/4 and transferred to [facility] 6/6. He has been afebrile and free of nausea or diarrhea    There are no active hospital problems to display for this patient.     Past Medical History:   Diagnosis Date    Diabetes [facility])      Past Surgical History:   Procedure Laterality Date    HX GI      NEUROLOGICAL PROCEDURE UNLISTED       No family history on file. Social History     Socioeconomic History    Marital status: SINGLE     Spouse name: Not on file    Number of children: Not on file    Years of education: Not on file    Highest education level: Not on file   Occupational History    Not on file   Social Needs    Financial resource strain: Not on file    Food insecurity:     Worry: Not on file     Inability: Not on file    Transportation needs:     Medical: Not on file     Non-medical: Not on file   Tobacco Use    Smoking status: Current Every Day Smoker     Packs/day: 0.25    Smokeless tobacco: Never Used   Substance and Sexual Activity    Alcohol use:  Yes     Alcohol/week: 3.0 oz     Types: 1 Shots of liquor, 4 Glasses of wine per week     Comment: socially    Drug use: Yes     Frequency: 7.0 times per week     Types: Marijuana    Sexual activity: Not on file   Lifestyle    Physical activity:     Days per week: Not on file     Minutes per session: Not on file    Stress: Not on file   Relationships    Social connections:     Talks on phone: Not on file     Gets together: Not on file     Attends Pentecostalism service: Not on file     Active member of club or organization: Not on file     Attends meetings of clubs or organizations: Not on file     Relationship status: Not on file    Intimate partner violence:     Fear of current or ex partner: Not on file     Emotionally abused: Not on file     Physically abused: Not on file     Forced sexual activity: Not on file   Other Topics Concern     Service Not Asked    Blood Transfusions Not Asked    Caffeine Concern Not Asked    Occupational Exposure Not Asked   Lizz Austin Hazards Not Asked    Sleep Concern Not Asked    Stress Concern Not Asked    Weight Concern Not Asked    Special Diet Not Asked    Back Care Not Asked    Exercise Not Asked    Bike Helmet Not Asked   Cornish Not Asked    Self-Exams Not Asked   Social History Narrative    Not on file       Allergies:    Contrast agent [iodine] . Medications:     Current Facility-Administered Medications   Medication Dose Route Frequency    insulin lispro (HUMALOG) injection   SubCUTAneous TIDAC    insulin lispro (HUMALOG) injection 6 Units  6 Units SubCUTAneous TID WITH MEALS    insulin glargine (LANTUS) injection 15 Units  15 Units SubCUTAneous BID    docusate sodium (COLACE) capsule 100 mg  100 mg Oral DAILY    Lactobacillus Acidoph & Bulgar (FLORANEX) tablet 2 Tab  2 Tab Oral BID    magnesium hydroxide (MILK OF MAGNESIA) 400 mg/5 mL oral suspension 30 mL  30 mL Oral DAILY PRN    bisacodyl (DULCOLAX) suppository 10 mg  10 mg Rectal DAILY PRN    cefTRIAXone (ROCEPHIN) 2 g in 0.9% sodium chloride (MBP/ADV) 50 mL MBP  2 g IntraVENous Q24H    calcium-vitamin D 600 mg(1,500mg) -200 unit per tablet 1 Tab  1 Tab Oral BID WITH MEALS    gabapentin (NEURONTIN) capsule 300 mg  300 mg Oral TID    simvastatin (ZOCOR) tablet 40 mg  40 mg Oral QHS    aspirin chewable tablet 81 mg  81 mg Oral DAILY    glucagon (GLUCAGEN) injection 1 mg  1 mg IntraMUSCular PRN    glucose chewable tablet 16 g  4 Tab Oral PRN    dextrose 10 % infusion 125-250 mL  125-250 mL IntraVENous PRN       ROS:   A comprehensive review of systems was negative except for that written in the History of Present Illness. Physical Exam:     Temp (24hrs), Av.3 °F (36.3 °C), Min:97 °F (36.1 °C), Max:97.6 °F (36.4 °C)    Visit Vitals  BP (!) 77/49   Pulse 96   Temp 97.6 °F (36.4 °C)   Resp 18   Wt 70.1 kg (154 lb 9.6 oz)   SpO2 97%   BMI 24.95 kg/m²       General: Well developed, well nourished 39 y.o.  male in no acute distress.   ENT: ENT exam normal, no neck nodes or sinus tenderness  Head: normocephalic, without obvious abnormality  Mouth:  mucous membranes moist, pharynx normal without lesions  Neck: supple, symmetrical, trachea midline   Cardio:  regular rate and rhythm, S1, S2 normal, no murmur, click, rub or gallop  Chest: inspection normal - no chest wall deformities or tenderness, respiratory effort normal  Lungs: clear to auscultation and no wheezes or rales  Abdomen: soft, non-tender. Bowel sounds normal. No masses, no organomegaly. Extremities:  no redness or tenderness in the calves or thighs, no edema, right lateral foot wound healing well, sutures still in place         Neuro: Grossly normal    Lab results:     Chemistry  No results for input(s): GLU, NA, K, CL, CO2, BUN, CREA, CA, AGAP, BUCR, TBIL, GPT, AP, TP, ALB, GLOB, AGRAT in the last 72 hours. CBC w/ Diff  No results for input(s): WBC, RBC, HGB, HCT, PLT, GRANS, LYMPH, EOS, HGBEXT, HCTEXT, PLTEXT in the last 72 hours.     Microbiology  All Micro Results     None             Mihir Kelsey MD, [facility]  Infectious Disease Specialist  Pager [phone]

## 2019-06-20 NOTE — DIABETES MGMT
Glycemic Control     Pt w/ hx of Type 2 Diabetes and variable BG levels. Hyperglycemic last night and this morning d/t excess carbohydrate intake yesterday: 460, 306 mg/dL. Pt states he consumed breakfast, a lunch while on leave for outside of hospital visit, lunch when he returned and dinner. Previously pt's fasting BG levels w/in target ranges. Pt recieves nighttime snack of whole sandwich, fruit cup, crackers and peanut butter. Snack comes w/juice but pt does not consume it. Pt states he will only eat night time snack if night time BG level is w/ in a reasonable range. Snack often eaten around 11-12 p.m. Current insulin regimen  Lantus 15 units 2x daily   Humalog 6 units w/ meals  Humalog corrective coverage very insulin resistant 3x daily    Recommendation  -Pt may benefit from the addition of night time corrective coverage. Encourage night time snack to prevent risk of overnight hypoglycemia.  -Limit night time snack to 2 carb servings in combination w/ protein and fat. -Omit juice from night time snack as it will contribute to hyperglycemia. Will continue to monitor and follow-up per policy.      Abi Olivera, [address]

## 2019-06-20 NOTE — PROGRESS NOTES
Met with . Hernan Loya and discussed discharge and copay days. He prefers to discharge on his 20th day to avoid copay days with Medicare. He reports he can arrange transport for a few days but would need Medicaid Cab once it goes in to effect on 7/1/2019. Contacted [facility] at [place] [phone]. They are reviewing schedule to begin his infusions but on the weekends they are not open and he would need to go to another facility. Order form emailed to be completed by physician.

## 2019-06-20 NOTE — ROUTINE PROCESS
Bedside and Verbal shift change report given to ESTER Dupree LPN (oncoming nurse) by MARIA G MonteRN (offgoing nurse). Report included the following information SBAR, Kardex and MAR.

## 2019-06-21 LAB
GLUCOSE BLD STRIP.AUTO-MCNC: 193 MG/DL (ref 70–110)
GLUCOSE BLD STRIP.AUTO-MCNC: 290 MG/DL (ref 70–110)
GLUCOSE BLD STRIP.AUTO-MCNC: 315 MG/DL (ref 70–110)
GLUCOSE BLD STRIP.AUTO-MCNC: 357 MG/DL (ref 70–110)
GLUCOSE BLD STRIP.AUTO-MCNC: 390 MG/DL (ref 70–110)

## 2019-06-21 PROCEDURE — 74011000258 HC RX REV CODE- 258: Performed by: NURSE PRACTITIONER

## 2019-06-21 PROCEDURE — 74011250637 HC RX REV CODE- 250/637: Performed by: INTERNAL MEDICINE

## 2019-06-21 PROCEDURE — 74011636637 HC RX REV CODE- 636/637: Performed by: INTERNAL MEDICINE

## 2019-06-21 PROCEDURE — 82962 GLUCOSE BLOOD TEST: CPT

## 2019-06-21 PROCEDURE — 74011250637 HC RX REV CODE- 250/637: Performed by: NURSE PRACTITIONER

## 2019-06-21 PROCEDURE — 74011636637 HC RX REV CODE- 636/637: Performed by: NURSE PRACTITIONER

## 2019-06-21 PROCEDURE — 74011250636 HC RX REV CODE- 250/636: Performed by: NURSE PRACTITIONER

## 2019-06-21 RX ADMIN — Medication 1 TABLET: at 17:27

## 2019-06-21 RX ADMIN — CEFTRIAXONE SODIUM 2 G: 2 INJECTION, POWDER, FOR SOLUTION INTRAMUSCULAR; INTRAVENOUS at 19:27

## 2019-06-21 RX ADMIN — ASPIRIN 81 MG 81 MG: 81 TABLET ORAL at 08:23

## 2019-06-21 RX ADMIN — INSULIN LISPRO 3 UNITS: 100 INJECTION, SOLUTION INTRAVENOUS; SUBCUTANEOUS at 08:23

## 2019-06-21 RX ADMIN — INSULIN LISPRO 12 UNITS: 100 INJECTION, SOLUTION INTRAVENOUS; SUBCUTANEOUS at 16:44

## 2019-06-21 RX ADMIN — DOCUSATE SODIUM 100 MG: 100 CAPSULE, LIQUID FILLED ORAL at 08:23

## 2019-06-21 RX ADMIN — GABAPENTIN 300 MG: 300 CAPSULE ORAL at 21:51

## 2019-06-21 RX ADMIN — INSULIN LISPRO 6 UNITS: 100 INJECTION, SOLUTION INTRAVENOUS; SUBCUTANEOUS at 16:44

## 2019-06-21 RX ADMIN — Medication 2 TABLET: at 08:23

## 2019-06-21 RX ADMIN — Medication 1 TABLET: at 08:23

## 2019-06-21 RX ADMIN — INSULIN GLARGINE 15 UNITS: 100 INJECTION, SOLUTION SUBCUTANEOUS at 08:24

## 2019-06-21 RX ADMIN — SIMVASTATIN 40 MG: 40 TABLET, FILM COATED ORAL at 21:51

## 2019-06-21 RX ADMIN — INSULIN LISPRO 9 UNITS: 100 INJECTION, SOLUTION INTRAVENOUS; SUBCUTANEOUS at 11:45

## 2019-06-21 RX ADMIN — INSULIN LISPRO 6 UNITS: 100 INJECTION, SOLUTION INTRAVENOUS; SUBCUTANEOUS at 11:46

## 2019-06-21 RX ADMIN — INSULIN GLARGINE 15 UNITS: 100 INJECTION, SOLUTION SUBCUTANEOUS at 21:25

## 2019-06-21 RX ADMIN — INSULIN LISPRO 6 UNITS: 100 INJECTION, SOLUTION INTRAVENOUS; SUBCUTANEOUS at 08:24

## 2019-06-21 RX ADMIN — Medication 2 TABLET: at 18:26

## 2019-06-21 RX ADMIN — GABAPENTIN 300 MG: 300 CAPSULE ORAL at 18:27

## 2019-06-21 RX ADMIN — GABAPENTIN 300 MG: 300 CAPSULE ORAL at 08:23

## 2019-06-21 NOTE — PROGRESS NOTES
completed follow up visit with and Spiritual assessment of patient and offered Pastoral care, see flow sheets for interventions.  Chaplains will continue to follow and will provide pastoral care on an as needed/requested basis    Chaplain Subhash Falcon   Board Certified [address] [place] [address]   [phone]

## 2019-06-21 NOTE — ROUTINE PROCESS
Bedside and Verbal shift change report given to ESTER Guerin  (oncoming nurse) by Fina Coe RN (offgoing nurse). Report included the following information SBAR, Kardex and Recent Results. QH visual checks and 24h chart checks done.

## 2019-06-22 LAB
GLUCOSE BLD STRIP.AUTO-MCNC: 150 MG/DL (ref 70–110)
GLUCOSE BLD STRIP.AUTO-MCNC: 206 MG/DL (ref 70–110)
GLUCOSE BLD STRIP.AUTO-MCNC: 262 MG/DL (ref 70–110)
GLUCOSE BLD STRIP.AUTO-MCNC: 72 MG/DL (ref 70–110)
GLUCOSE BLD STRIP.AUTO-MCNC: 81 MG/DL (ref 70–110)

## 2019-06-22 PROCEDURE — 77030011256 HC DRSG MEPILEX <16IN NO BORD MOLN -A

## 2019-06-22 PROCEDURE — 74011250637 HC RX REV CODE- 250/637: Performed by: INTERNAL MEDICINE

## 2019-06-22 PROCEDURE — 74011250636 HC RX REV CODE- 250/636: Performed by: NURSE PRACTITIONER

## 2019-06-22 PROCEDURE — 74011000258 HC RX REV CODE- 258: Performed by: NURSE PRACTITIONER

## 2019-06-22 PROCEDURE — 82962 GLUCOSE BLOOD TEST: CPT

## 2019-06-22 PROCEDURE — 74011636637 HC RX REV CODE- 636/637: Performed by: INTERNAL MEDICINE

## 2019-06-22 PROCEDURE — 74011250637 HC RX REV CODE- 250/637: Performed by: NURSE PRACTITIONER

## 2019-06-22 PROCEDURE — 74011636637 HC RX REV CODE- 636/637: Performed by: NURSE PRACTITIONER

## 2019-06-22 RX ADMIN — INSULIN LISPRO 6 UNITS: 100 INJECTION, SOLUTION INTRAVENOUS; SUBCUTANEOUS at 17:16

## 2019-06-22 RX ADMIN — GABAPENTIN 300 MG: 300 CAPSULE ORAL at 08:57

## 2019-06-22 RX ADMIN — INSULIN GLARGINE 15 UNITS: 100 INJECTION, SOLUTION SUBCUTANEOUS at 21:56

## 2019-06-22 RX ADMIN — CEFTRIAXONE SODIUM 2 G: 2 INJECTION, POWDER, FOR SOLUTION INTRAMUSCULAR; INTRAVENOUS at 19:13

## 2019-06-22 RX ADMIN — SIMVASTATIN 40 MG: 40 TABLET, FILM COATED ORAL at 21:59

## 2019-06-22 RX ADMIN — Medication 1 TABLET: at 08:57

## 2019-06-22 RX ADMIN — INSULIN LISPRO 6 UNITS: 100 INJECTION, SOLUTION INTRAVENOUS; SUBCUTANEOUS at 12:16

## 2019-06-22 RX ADMIN — GABAPENTIN 300 MG: 300 CAPSULE ORAL at 17:15

## 2019-06-22 RX ADMIN — GABAPENTIN 300 MG: 300 CAPSULE ORAL at 21:58

## 2019-06-22 RX ADMIN — INSULIN GLARGINE 15 UNITS: 100 INJECTION, SOLUTION SUBCUTANEOUS at 08:54

## 2019-06-22 RX ADMIN — INSULIN LISPRO 3 UNITS: 100 INJECTION, SOLUTION INTRAVENOUS; SUBCUTANEOUS at 17:18

## 2019-06-22 RX ADMIN — DOCUSATE SODIUM 100 MG: 100 CAPSULE, LIQUID FILLED ORAL at 08:57

## 2019-06-22 RX ADMIN — Medication 1 TABLET: at 17:15

## 2019-06-22 RX ADMIN — Medication 2 TABLET: at 17:15

## 2019-06-22 RX ADMIN — Medication 2 TABLET: at 08:57

## 2019-06-22 RX ADMIN — INSULIN LISPRO 6 UNITS: 100 INJECTION, SOLUTION INTRAVENOUS; SUBCUTANEOUS at 08:54

## 2019-06-22 RX ADMIN — INSULIN LISPRO 6 UNITS: 100 INJECTION, SOLUTION INTRAVENOUS; SUBCUTANEOUS at 12:13

## 2019-06-22 RX ADMIN — ASPIRIN 81 MG 81 MG: 81 TABLET ORAL at 08:57

## 2019-06-22 NOTE — ROUTINE PROCESS
0534 blood sugar checked 72mg/dl patient alert and oriented. 0540 4 ounces apple juice given.     0602 repeat blood sugar 81 mg/dl, 4 ounces of apple juice given to patient.

## 2019-06-22 NOTE — PROGRESS NOTES
Progress Note    Patient: Evaristo Gracia MRN: [id number]  SSN: [id number]    YOB: 1975  Age: 38 y.o. Sex: male      Admit Date: 6/6/2019  * No surgery found *     Procedure:       Subjective:     Patient seen resting quiet and comfortably and no apparent distress. Objective:     Visit Vitals  /67   Pulse 86   Temp 96.7 °F (35.9 °C)   Resp 18   Wt 70.1 kg (154 lb 9.6 oz)   SpO2 100%   BMI 24.95 kg/m²        Physical Exam:  Right foot presents with sutures intact and incision clean and dry. No evidence of infection noted. No pain on palpation. NVS unchanged      Labs/Radiology Review: images and reports reviewed    Assessment:     Hospital Problems  Date Reviewed: 6/2/2019    None          Plan/Recommendations/Medical Decision Making:   Patient seen this morning  Ok for sutures to be removed today  Continue with [address] with mepliex border changed daily  Will need to continue to be NWB to the right foot  Continue abx  Greater than 20 minutes was spent with the patient and all questions answered.

## 2019-06-22 NOTE — ROUTINE PROCESS
Bedside, Verbal and Written shift change report given to suzanne steiner (oncoming nurse) by Ramya Mao (offgoing nurse). Report included the following information SBAR, Intake/Output and MAR.

## 2019-06-22 NOTE — ROUTINE PROCESS
Bedside and Verbal shift change report given to Lanie Vora RN (oncoming nurse) by Nikkie Arevalo LPN (offgoing nurse). Report included the following information SBAR, Kardex and MAR.

## 2019-06-23 LAB
GLUCOSE BLD STRIP.AUTO-MCNC: 125 MG/DL (ref 70–110)
GLUCOSE BLD STRIP.AUTO-MCNC: 147 MG/DL (ref 70–110)
GLUCOSE BLD STRIP.AUTO-MCNC: 355 MG/DL (ref 70–110)
GLUCOSE BLD STRIP.AUTO-MCNC: 473 MG/DL (ref 70–110)

## 2019-06-23 PROCEDURE — 74011636637 HC RX REV CODE- 636/637: Performed by: NURSE PRACTITIONER

## 2019-06-23 PROCEDURE — 82962 GLUCOSE BLOOD TEST: CPT

## 2019-06-23 PROCEDURE — 74011000258 HC RX REV CODE- 258: Performed by: NURSE PRACTITIONER

## 2019-06-23 PROCEDURE — 74011250636 HC RX REV CODE- 250/636: Performed by: NURSE PRACTITIONER

## 2019-06-23 PROCEDURE — 74011250637 HC RX REV CODE- 250/637: Performed by: NURSE PRACTITIONER

## 2019-06-23 PROCEDURE — 74011250637 HC RX REV CODE- 250/637: Performed by: INTERNAL MEDICINE

## 2019-06-23 PROCEDURE — 74011636637 HC RX REV CODE- 636/637: Performed by: INTERNAL MEDICINE

## 2019-06-23 RX ADMIN — DOCUSATE SODIUM 100 MG: 100 CAPSULE, LIQUID FILLED ORAL at 09:11

## 2019-06-23 RX ADMIN — Medication 2 TABLET: at 21:35

## 2019-06-23 RX ADMIN — Medication 1 TABLET: at 09:11

## 2019-06-23 RX ADMIN — INSULIN GLARGINE 15 UNITS: 100 INJECTION, SOLUTION SUBCUTANEOUS at 21:00

## 2019-06-23 RX ADMIN — INSULIN GLARGINE 15 UNITS: 100 INJECTION, SOLUTION SUBCUTANEOUS at 09:09

## 2019-06-23 RX ADMIN — SIMVASTATIN 40 MG: 40 TABLET, FILM COATED ORAL at 21:36

## 2019-06-23 RX ADMIN — ASPIRIN 81 MG 81 MG: 81 TABLET ORAL at 09:11

## 2019-06-23 RX ADMIN — INSULIN LISPRO 15 UNITS: 100 INJECTION, SOLUTION INTRAVENOUS; SUBCUTANEOUS at 09:09

## 2019-06-23 RX ADMIN — CEFTRIAXONE SODIUM 2 G: 2 INJECTION, POWDER, FOR SOLUTION INTRAMUSCULAR; INTRAVENOUS at 21:34

## 2019-06-23 RX ADMIN — Medication 1 TABLET: at 21:35

## 2019-06-23 RX ADMIN — Medication 2 TABLET: at 09:11

## 2019-06-23 RX ADMIN — INSULIN LISPRO 6 UNITS: 100 INJECTION, SOLUTION INTRAVENOUS; SUBCUTANEOUS at 12:06

## 2019-06-23 RX ADMIN — GABAPENTIN 300 MG: 300 CAPSULE ORAL at 09:11

## 2019-06-23 RX ADMIN — INSULIN LISPRO 6 UNITS: 100 INJECTION, SOLUTION INTRAVENOUS; SUBCUTANEOUS at 09:12

## 2019-06-23 RX ADMIN — GABAPENTIN 300 MG: 300 CAPSULE ORAL at 21:36

## 2019-06-23 NOTE — ROUTINE PROCESS
Patient had sutures removed by 7-3 nurse. Dressing dry & intact to (R) foot. Denies any pain or discomfort. PICC to (R) UE flush, patient , & positive blood return. IV antibiotics tolerated without difficulty.

## 2019-06-23 NOTE — ROUTINE PROCESS
Patient left floor to go downstairs to make a call on cell for better reception. He was given a patient pass. He went outside and security had to let him back in due to front doors being lock. Patient was informed that if he need or want to go outside after 2000 that a staff member may have to go with him to let him back in. The  had to make out a report so he was given the patient information, charge nurse name (this writer), & CNA name.

## 2019-06-23 NOTE — ROUTINE PROCESS
Pt requested SUNG for today to visit cat at [facility], Dr. Carla Dunn called and called back and ordered Patient ok for SUNG for today.

## 2019-06-24 LAB
ANION GAP SERPL CALC-SCNC: 4 MMOL/L (ref 3–18)
BASOPHILS # BLD: 0 K/UL (ref 0–0.1)
BASOPHILS NFR BLD: 1 % (ref 0–2)
BUN SERPL-MCNC: 27 MG/DL (ref 7–18)
BUN/CREAT SERPL: 25 (ref 12–20)
CALCIUM SERPL-MCNC: 8.9 MG/DL (ref 8.5–10.1)
CHLORIDE SERPL-SCNC: 102 MMOL/L (ref 100–108)
CO2 SERPL-SCNC: 33 MMOL/L (ref 21–32)
CREAT SERPL-MCNC: 1.1 MG/DL (ref 0.6–1.3)
DIFFERENTIAL METHOD BLD: ABNORMAL
EOSINOPHIL # BLD: 0.2 K/UL (ref 0–0.4)
EOSINOPHIL NFR BLD: 4 % (ref 0–5)
ERYTHROCYTE [DISTWIDTH] IN BLOOD BY AUTOMATED COUNT: 12.6 % (ref 11.6–14.5)
GLUCOSE BLD STRIP.AUTO-MCNC: 115 MG/DL (ref 70–110)
GLUCOSE BLD STRIP.AUTO-MCNC: 147 MG/DL (ref 70–110)
GLUCOSE BLD STRIP.AUTO-MCNC: 160 MG/DL (ref 70–110)
GLUCOSE BLD STRIP.AUTO-MCNC: 395 MG/DL (ref 70–110)
GLUCOSE SERPL-MCNC: 217 MG/DL (ref 74–99)
HCT VFR BLD AUTO: 32.6 % (ref 36–48)
HGB BLD-MCNC: 9.9 G/DL (ref 13–16)
LYMPHOCYTES # BLD: 1.8 K/UL (ref 0.9–3.6)
LYMPHOCYTES NFR BLD: 43 % (ref 21–52)
MCH RBC QN AUTO: 28.4 PG (ref 24–34)
MCHC RBC AUTO-ENTMCNC: 30.4 G/DL (ref 31–37)
MCV RBC AUTO: 93.4 FL (ref 74–97)
MONOCYTES # BLD: 0.3 K/UL (ref 0.05–1.2)
MONOCYTES NFR BLD: 8 % (ref 3–10)
NEUTS SEG # BLD: 1.8 K/UL (ref 1.8–8)
NEUTS SEG NFR BLD: 44 % (ref 40–73)
PLATELET # BLD AUTO: 218 K/UL (ref 135–420)
PMV BLD AUTO: 10.6 FL (ref 9.2–11.8)
POTASSIUM SERPL-SCNC: 4.2 MMOL/L (ref 3.5–5.5)
RBC # BLD AUTO: 3.49 M/UL (ref 4.7–5.5)
SODIUM SERPL-SCNC: 139 MMOL/L (ref 136–145)
WBC # BLD AUTO: 4.2 K/UL (ref 4.6–13.2)

## 2019-06-24 PROCEDURE — 77030020847 HC STATLOK BARD -A

## 2019-06-24 PROCEDURE — 74011000258 HC RX REV CODE- 258: Performed by: NURSE PRACTITIONER

## 2019-06-24 PROCEDURE — 74011636637 HC RX REV CODE- 636/637: Performed by: INTERNAL MEDICINE

## 2019-06-24 PROCEDURE — 80048 BASIC METABOLIC PNL TOTAL CA: CPT

## 2019-06-24 PROCEDURE — 36415 COLL VENOUS BLD VENIPUNCTURE: CPT

## 2019-06-24 PROCEDURE — 74011636637 HC RX REV CODE- 636/637: Performed by: NURSE PRACTITIONER

## 2019-06-24 PROCEDURE — 36592 COLLECT BLOOD FROM PICC: CPT

## 2019-06-24 PROCEDURE — 85025 COMPLETE CBC W/AUTO DIFF WBC: CPT

## 2019-06-24 PROCEDURE — 74011250636 HC RX REV CODE- 250/636: Performed by: NURSE PRACTITIONER

## 2019-06-24 PROCEDURE — 74011250637 HC RX REV CODE- 250/637: Performed by: INTERNAL MEDICINE

## 2019-06-24 PROCEDURE — 82962 GLUCOSE BLOOD TEST: CPT

## 2019-06-24 PROCEDURE — 74011250637 HC RX REV CODE- 250/637: Performed by: NURSE PRACTITIONER

## 2019-06-24 RX ADMIN — SIMVASTATIN 40 MG: 40 TABLET, FILM COATED ORAL at 21:59

## 2019-06-24 RX ADMIN — INSULIN GLARGINE 15 UNITS: 100 INJECTION, SOLUTION SUBCUTANEOUS at 22:01

## 2019-06-24 RX ADMIN — INSULIN LISPRO 6 UNITS: 100 INJECTION, SOLUTION INTRAVENOUS; SUBCUTANEOUS at 16:34

## 2019-06-24 RX ADMIN — CEFTRIAXONE SODIUM 2 G: 2 INJECTION, POWDER, FOR SOLUTION INTRAMUSCULAR; INTRAVENOUS at 18:34

## 2019-06-24 RX ADMIN — Medication 1 TABLET: at 17:33

## 2019-06-24 RX ADMIN — GABAPENTIN 300 MG: 300 CAPSULE ORAL at 08:58

## 2019-06-24 RX ADMIN — INSULIN GLARGINE 15 UNITS: 100 INJECTION, SOLUTION SUBCUTANEOUS at 09:00

## 2019-06-24 RX ADMIN — INSULIN LISPRO 6 UNITS: 100 INJECTION, SOLUTION INTRAVENOUS; SUBCUTANEOUS at 09:01

## 2019-06-24 RX ADMIN — GABAPENTIN 300 MG: 300 CAPSULE ORAL at 21:59

## 2019-06-24 RX ADMIN — Medication 2 TABLET: at 08:58

## 2019-06-24 RX ADMIN — DOCUSATE SODIUM 100 MG: 100 CAPSULE, LIQUID FILLED ORAL at 08:58

## 2019-06-24 RX ADMIN — GABAPENTIN 300 MG: 300 CAPSULE ORAL at 16:34

## 2019-06-24 RX ADMIN — INSULIN LISPRO 3 UNITS: 100 INJECTION, SOLUTION INTRAVENOUS; SUBCUTANEOUS at 12:19

## 2019-06-24 RX ADMIN — Medication 2 TABLET: at 18:33

## 2019-06-24 RX ADMIN — ASPIRIN 81 MG 81 MG: 81 TABLET ORAL at 08:58

## 2019-06-24 RX ADMIN — Medication 1 TABLET: at 07:01

## 2019-06-24 RX ADMIN — INSULIN LISPRO 6 UNITS: 100 INJECTION, SOLUTION INTRAVENOUS; SUBCUTANEOUS at 12:19

## 2019-06-24 NOTE — ROUTINE PROCESS
Written shift change report given to [address] [place] [address] (oncoming nurse) by Ansley Pereira RN (offgoing nurse). Report included the following information SBAR, Kardex and MAR.

## 2019-06-24 NOTE — PROGRESS NOTES
Met with Mr. Mykel Garrett and discussed discharge planning. He will follow up with Outpatient infusion beginning on Friday 6/28/2019 at 2:00 PM.  Schedule for IV antibiotics at the Outpatient infusion center provided. Reviewed and signed agreement to [facility] knee scooter. Reviewed and signed [facility] and copy left. Request for standing order transportation sent to [facility]  to begin transportation on 7/1/2019. He continues to enjoy self directed independent activities at the bedside. Will continue to follow.

## 2019-06-24 NOTE — ROUTINE PROCESS
Up and about in his room utilizing knee scooter, maintaining NWB  On right foot. PICC line right upper arm dressing clean and dry and intact. And Capped.

## 2019-06-25 LAB
GLUCOSE BLD STRIP.AUTO-MCNC: 142 MG/DL (ref 70–110)
GLUCOSE BLD STRIP.AUTO-MCNC: 265 MG/DL (ref 70–110)
GLUCOSE BLD STRIP.AUTO-MCNC: 332 MG/DL (ref 70–110)

## 2019-06-25 PROCEDURE — 74011000258 HC RX REV CODE- 258: Performed by: NURSE PRACTITIONER

## 2019-06-25 PROCEDURE — 82962 GLUCOSE BLOOD TEST: CPT

## 2019-06-25 PROCEDURE — 74011250637 HC RX REV CODE- 250/637: Performed by: INTERNAL MEDICINE

## 2019-06-25 PROCEDURE — 74011636637 HC RX REV CODE- 636/637: Performed by: NURSE PRACTITIONER

## 2019-06-25 PROCEDURE — 77030011256 HC DRSG MEPILEX <16IN NO BORD MOLN -A

## 2019-06-25 PROCEDURE — 74011250637 HC RX REV CODE- 250/637: Performed by: NURSE PRACTITIONER

## 2019-06-25 PROCEDURE — 74011636637 HC RX REV CODE- 636/637: Performed by: INTERNAL MEDICINE

## 2019-06-25 PROCEDURE — 74011250636 HC RX REV CODE- 250/636: Performed by: NURSE PRACTITIONER

## 2019-06-25 RX ADMIN — ASPIRIN 81 MG 81 MG: 81 TABLET ORAL at 08:39

## 2019-06-25 RX ADMIN — INSULIN GLARGINE 15 UNITS: 100 INJECTION, SOLUTION SUBCUTANEOUS at 21:52

## 2019-06-25 RX ADMIN — Medication 2 TABLET: at 08:39

## 2019-06-25 RX ADMIN — INSULIN GLARGINE 15 UNITS: 100 INJECTION, SOLUTION SUBCUTANEOUS at 08:40

## 2019-06-25 RX ADMIN — DOCUSATE SODIUM 100 MG: 100 CAPSULE, LIQUID FILLED ORAL at 08:39

## 2019-06-25 RX ADMIN — Medication 2 TABLET: at 19:21

## 2019-06-25 RX ADMIN — INSULIN LISPRO 6 UNITS: 100 INJECTION, SOLUTION INTRAVENOUS; SUBCUTANEOUS at 12:25

## 2019-06-25 RX ADMIN — INSULIN LISPRO 6 UNITS: 100 INJECTION, SOLUTION INTRAVENOUS; SUBCUTANEOUS at 08:40

## 2019-06-25 RX ADMIN — SIMVASTATIN 40 MG: 40 TABLET, FILM COATED ORAL at 21:52

## 2019-06-25 RX ADMIN — GABAPENTIN 300 MG: 300 CAPSULE ORAL at 21:52

## 2019-06-25 RX ADMIN — INSULIN LISPRO 9 UNITS: 100 INJECTION, SOLUTION INTRAVENOUS; SUBCUTANEOUS at 12:25

## 2019-06-25 RX ADMIN — Medication 1 TABLET: at 08:39

## 2019-06-25 RX ADMIN — CEFTRIAXONE SODIUM 2 G: 2 INJECTION, POWDER, FOR SOLUTION INTRAMUSCULAR; INTRAVENOUS at 19:20

## 2019-06-25 RX ADMIN — GABAPENTIN 300 MG: 300 CAPSULE ORAL at 08:39

## 2019-06-25 NOTE — PROGRESS NOTES
conducted a Follow up consultation and Spiritual Assessment for Faustino Woodson, who is a 38 y.o.,male. The  provided the following Interventions:  Continued the relationship of care and support. Listened empathically. Offered prayer and assurance of continued prayer on patients behalf. Chart reviewed. The following outcomes were achieved:  Patient expressed gratitude for pastoral care visit. Assessment:  There are no further spiritual or Congregational issues which require Spiritual Care Services interventions at this time. Plan:  Chaplains will continue to follow and will provide pastoral care on an as needed/requested basis.  recommends bedside caregivers page  on duty if patient shows signs of acute spiritual or emotional distress.      [facility]   Staff 333 [facility]   [phone]

## 2019-06-25 NOTE — ROUTINE PROCESS
Bedside, Verbal and Written shift change report given to magalis hurt (oncoming nurse) by Julio Villarreal (offgoing nurse). Report included the following information SBAR, Intake/Output and MAR.

## 2019-06-25 NOTE — ROUTINE PROCESS
Patient requested SUNG for no more than 4 hours so that he can take his cat back to [facility] for re check. Ne order received to allow parent to leave for SUNG.

## 2019-06-26 LAB
GLUCOSE BLD STRIP.AUTO-MCNC: 214 MG/DL (ref 70–110)
GLUCOSE BLD STRIP.AUTO-MCNC: 224 MG/DL (ref 70–110)
GLUCOSE BLD STRIP.AUTO-MCNC: 234 MG/DL (ref 70–110)
GLUCOSE BLD STRIP.AUTO-MCNC: 72 MG/DL (ref 70–110)

## 2019-06-26 PROCEDURE — 74011250637 HC RX REV CODE- 250/637: Performed by: NURSE PRACTITIONER

## 2019-06-26 PROCEDURE — 82962 GLUCOSE BLOOD TEST: CPT

## 2019-06-26 PROCEDURE — 74011000258 HC RX REV CODE- 258: Performed by: NURSE PRACTITIONER

## 2019-06-26 PROCEDURE — 74011636637 HC RX REV CODE- 636/637: Performed by: INTERNAL MEDICINE

## 2019-06-26 PROCEDURE — 74011250637 HC RX REV CODE- 250/637: Performed by: INTERNAL MEDICINE

## 2019-06-26 PROCEDURE — 74011636637 HC RX REV CODE- 636/637: Performed by: NURSE PRACTITIONER

## 2019-06-26 PROCEDURE — 74011250636 HC RX REV CODE- 250/636: Performed by: NURSE PRACTITIONER

## 2019-06-26 RX ADMIN — Medication 1 TABLET: at 08:45

## 2019-06-26 RX ADMIN — INSULIN GLARGINE 15 UNITS: 100 INJECTION, SOLUTION SUBCUTANEOUS at 21:47

## 2019-06-26 RX ADMIN — GABAPENTIN 300 MG: 300 CAPSULE ORAL at 08:45

## 2019-06-26 RX ADMIN — CEFTRIAXONE SODIUM 2 G: 2 INJECTION, POWDER, FOR SOLUTION INTRAMUSCULAR; INTRAVENOUS at 19:07

## 2019-06-26 RX ADMIN — INSULIN LISPRO 6 UNITS: 100 INJECTION, SOLUTION INTRAVENOUS; SUBCUTANEOUS at 08:41

## 2019-06-26 RX ADMIN — Medication 2 TABLET: at 08:45

## 2019-06-26 RX ADMIN — INSULIN LISPRO 6 UNITS: 100 INJECTION, SOLUTION INTRAVENOUS; SUBCUTANEOUS at 16:47

## 2019-06-26 RX ADMIN — ASPIRIN 81 MG 81 MG: 81 TABLET ORAL at 08:45

## 2019-06-26 RX ADMIN — Medication 2 TABLET: at 18:19

## 2019-06-26 RX ADMIN — INSULIN LISPRO 6 UNITS: 100 INJECTION, SOLUTION INTRAVENOUS; SUBCUTANEOUS at 12:30

## 2019-06-26 RX ADMIN — Medication 1 TABLET: at 17:18

## 2019-06-26 RX ADMIN — DOCUSATE SODIUM 100 MG: 100 CAPSULE, LIQUID FILLED ORAL at 08:45

## 2019-06-26 RX ADMIN — INSULIN GLARGINE 15 UNITS: 100 INJECTION, SOLUTION SUBCUTANEOUS at 08:42

## 2019-06-26 RX ADMIN — GABAPENTIN 300 MG: 300 CAPSULE ORAL at 21:44

## 2019-06-26 RX ADMIN — INSULIN LISPRO 6 UNITS: 100 INJECTION, SOLUTION INTRAVENOUS; SUBCUTANEOUS at 08:42

## 2019-06-26 RX ADMIN — INSULIN LISPRO 6 UNITS: 100 INJECTION, SOLUTION INTRAVENOUS; SUBCUTANEOUS at 16:18

## 2019-06-26 RX ADMIN — SIMVASTATIN 40 MG: 40 TABLET, FILM COATED ORAL at 21:44

## 2019-06-26 RX ADMIN — GABAPENTIN 300 MG: 300 CAPSULE ORAL at 16:19

## 2019-06-26 NOTE — PROGRESS NOTES
[facility]    Daily progress Note    Patient: Michell Singleton MRN: [id number]  CSN: [id number]    YOB: 1975  Age: 38 y.o. Sex: male    DOA: 6/6/2019 LOS:  LOS: 20 days                    Subjective:     CC: follow blood surgars    Mr. Jessi Preciado is a 39 yr old  Male patient. Patient recently hospitalized from 5/29-6/6.  Patient was seen in the Er for eval of chronic right lateral plantar ulcer. Patient was found to have new dorsolateral right foot OM on XR. MRI then confirm OM. He was seen by podiatry and they had   resection 5th metatarsal head along with base of prox phalanx, second pass bone biopsies of the 5th metatarsal and proximal phalanx, and flap closure on 6/2. Second pass prox phalanx bone biopsy findings were positive for chronic osteomyelitis.  He was seen by ID and they recommended 6 weeks of IV Rocephin vis PICC line. His BC did come back + for group C Streptococcus on 5/29, per ID possibel contaminant vs from OM wound infection,, continue ivabx. Patient improved and sent to [facility] for rehab.     6/24/2019  Since admitted noted hypotensive episode, bp in the 80s. he is alert and verbal, he denies any nvd, fever or chills. He BP is stable today. Patient report having bm, no issues. He report he tolerating Pt/ot.  Appetiet is good, BS reviewed improving. BS reviewed has improved, plans for discharge on Friday and to continue IV abx OP. Patient denies any issues.  PICC line to DAX is wnl        PAST MEDICAL HISTORY Insulin-dependent diabetes, hypertension.     PAST SURGICAL HISTORY:  Surgery on the left leg for fracture.     ALLERGIES:  TO IV CONTRAST.     CURRENT MEDICATIONS:  Medication list reviewed.     SOCIAL HISTORY: Elda Wagner admits to smoking and drinking occasionally.     FAMILY HISTORY:  Positive for diabetes.     REVIEW OF SYSTEMS:  No fever or chills.  No weight loss or headache.  No chest pain or palpitation.  No shortness of breath or cough.  No nausea, vomiting, diarrhea.  No dysuria or polyuria.  No back pain or leg pain.  No heat or cold intolerance.  No anxiety or depression.          Objective:      Visit Vitals  /75   Pulse 97   Temp 98.6 °F (37 °C)   Resp 18   Wt 71.8 kg (158 lb 4.8 oz)   SpO2 99%   BMI 25.55 kg/m²       Physical Exam:  General appearance: alert, cooperative, no distress, appears stated age  [de-identified]: negative  Neck: supple, symmetrical, trachea midline, no adenopathy, thyroid: not enlarged, symmetric, no tenderness/mass/nodules, no carotid bruit and no JVD  Lungs: clear to auscultation bilaterally  Heart: regular rate and rhythm, S1, S2 normal, no murmur, click, rub or gallop  Abdomen: soft, non-tender. Bowel sounds normal. No masses,  no organomegaly  Pulses: 2+ and symmetric  Skin: right foot dressing in place    Intake and Output:  Current Shift:  No intake/output data recorded.   Last three shifts:  06/24 1901 - 06/26 0700  In: -   Out: 400 [Urine:400]    Recent Results (from the past 24 hour(s))   GLUCOSE, POC    Collection Time: 06/25/19  9:53 PM   Result Value Ref Range    Glucose (POC) 332 (H) 70 - 110 mg/dL   GLUCOSE, POC    Collection Time: 06/26/19  4:30 AM   Result Value Ref Range    Glucose (POC) 224 (H) 70 - 110 mg/dL   GLUCOSE, POC    Collection Time: 06/26/19 12:29 PM   Result Value Ref Range    Glucose (POC) 72 70 - 110 mg/dL         Current Facility-Administered Medications:     [facility] TCC ANESTHESIA, , Other, PRN, Iggy Vuong MD    [facility] TCC EMERGENCY/STAT BOX, , Other, PRN, Iggy Vuong MD    insulin lispro (HUMALOG) injection, , SubCUTAneous, TIDAC, RuthmomenianKuldeep MD, Stopped at 06/26/19 1229    insulin lispro (HUMALOG) injection 6 Units, 6 Units, SubCUTAneous, TID WITH MEALS, Ival Sides A, NP, 6 Units at 06/26/19 1230    insulin glargine (LANTUS) injection 15 Units, 15 Units, SubCUTAneous, BID, Ival Sides A, NP, 15 Units at 06/26/19 0842    docusate sodium (COLACE) capsule 100 mg, 100 mg, Oral, DAILY, Linda De La Torre NP, 100 mg at 06/26/19 0845    Lactobacillus Acidoph & Bulgar [facility]) tablet 2 Tab, 2 Tab, Oral, BID, Fadumo ARNOLD NP, 2 Tab at 06/26/19 0845    magnesium hydroxide (MILK OF MAGNESIA) 400 mg/5 mL oral suspension 30 mL, 30 mL, Oral, DAILY PRN, Haylee Jacob MD, 30 mL at 06/07/19 0542    bisacodyl (DULCOLAX) suppository 10 mg, 10 mg, Rectal, DAILY PRN, Haylee Jacob MD    cefTRIAXone (ROCEPHIN) 2 g in 0.9% sodium chloride (MBP/ADV) 50 mL MBP, 2 g, IntraVENous, Q24H, Beth Liao NP, Last Rate: 100 mL/hr at 06/25/19 1920, 2 g at 06/25/19 1920    calcium-vitamin D 600 mg(1,500mg) -200 unit per tablet 1 Tab, 1 Tab, Oral, BID WITH MEALS, Haylee Jacob MD, 1 Tab at 06/26/19 0845    gabapentin (NEURONTIN) capsule 300 mg, 300 mg, Oral, TID, Haylee Jacob MD, 300 mg at 06/26/19 0845    simvastatin (ZOCOR) tablet 40 mg, 40 mg, Oral, QHS, Haylee Jacob MD, 40 mg at 06/25/19 2152    aspirin chewable tablet 81 mg, 81 mg, Oral, DAILY, Haylee Jacob MD, 81 mg at 06/26/19 0845    glucagon (GLUCAGEN) injection 1 mg, 1 mg, IntraMUSCular, PRN, Haylee Jacob MD    glucose chewable tablet 16 g, 4 Tab, Oral, PRN, Haylee Jacob MD    dextrose 10 % infusion 125-250 mL, 125-250 mL, IntraVENous, PRN, Haylee Jacob MD    Lab Results   Component Value Date/Time    Glucose 217 (H) 06/24/2019 04:35 AM    Glucose 89 06/17/2019 03:50 AM    Glucose 252 (H) 06/13/2019 01:05 AM    Glucose 339 (H) 06/10/2019 04:05 AM    Glucose 167 (H) 06/03/2019 04:38 AM        Assessment/Plan   New dorsolateral right foot OM:  patient is s/p resection 5th metatarsal head along with base of prox phalanx, second pass bone biopsies of the 5th metatarsal and proximal phalanx, and flap closure on 6/2. Patient to follow up with [facility]. Continue Ivbx per ID recommendation or 6 weeks via PICC line.  Continue wound care     DM type 2:  hgba1c is 8.5 , BS not controlled, continue Lantus to 15 units bid and meal insulin 6 units tid, will continue monitor for hypoglycemia, encourage bedtime snack.      Hypotension episode probable from dehydration - he is not on any bp meds, he is asymptomatic. received  ivf times 2 days ago, encourage to increase po fluid intake and monitor     Hyperlipidemia: continue Zocor     Left arm weakness: noted from previous neck injury.      Constipation : resolved continue bowel regiment and monitor        Iain Hernandez NP  6/26/2019, 1:06 PM

## 2019-06-26 NOTE — ROUTINE PROCESS
Bedside, Verbal and Written shift change report given to magalis hurt (oncoming nurse) by Alexandr Amador (offgoing nurse). Report included the following information SBAR, Intake/Output and MAR.

## 2019-06-27 ENCOUNTER — PATIENT OUTREACH (OUTPATIENT)
Dept: CASE MANAGEMENT | Age: 44
End: 2019-06-27

## 2019-06-27 LAB
GLUCOSE BLD STRIP.AUTO-MCNC: 107 MG/DL (ref 70–110)
GLUCOSE BLD STRIP.AUTO-MCNC: 179 MG/DL (ref 70–110)
GLUCOSE BLD STRIP.AUTO-MCNC: 245 MG/DL (ref 70–110)
GLUCOSE BLD STRIP.AUTO-MCNC: 350 MG/DL (ref 70–110)

## 2019-06-27 PROCEDURE — 74011250636 HC RX REV CODE- 250/636: Performed by: NURSE PRACTITIONER

## 2019-06-27 PROCEDURE — 74011000258 HC RX REV CODE- 258: Performed by: NURSE PRACTITIONER

## 2019-06-27 PROCEDURE — 74011636637 HC RX REV CODE- 636/637: Performed by: NURSE PRACTITIONER

## 2019-06-27 PROCEDURE — 74011250637 HC RX REV CODE- 250/637: Performed by: INTERNAL MEDICINE

## 2019-06-27 PROCEDURE — 74011636637 HC RX REV CODE- 636/637: Performed by: INTERNAL MEDICINE

## 2019-06-27 PROCEDURE — 82962 GLUCOSE BLOOD TEST: CPT

## 2019-06-27 PROCEDURE — 74011250637 HC RX REV CODE- 250/637: Performed by: NURSE PRACTITIONER

## 2019-06-27 RX ADMIN — INSULIN LISPRO 15 UNITS: 100 INJECTION, SOLUTION INTRAVENOUS; SUBCUTANEOUS at 17:18

## 2019-06-27 RX ADMIN — Medication 2 TABLET: at 18:16

## 2019-06-27 RX ADMIN — DOCUSATE SODIUM 100 MG: 100 CAPSULE, LIQUID FILLED ORAL at 08:34

## 2019-06-27 RX ADMIN — ASPIRIN 81 MG 81 MG: 81 TABLET ORAL at 08:34

## 2019-06-27 RX ADMIN — GABAPENTIN 300 MG: 300 CAPSULE ORAL at 21:40

## 2019-06-27 RX ADMIN — INSULIN GLARGINE 15 UNITS: 100 INJECTION, SOLUTION SUBCUTANEOUS at 08:00

## 2019-06-27 RX ADMIN — Medication 1 TABLET: at 08:34

## 2019-06-27 RX ADMIN — Medication 1 TABLET: at 17:17

## 2019-06-27 RX ADMIN — INSULIN LISPRO 6 UNITS: 100 INJECTION, SOLUTION INTRAVENOUS; SUBCUTANEOUS at 17:17

## 2019-06-27 RX ADMIN — GABAPENTIN 300 MG: 300 CAPSULE ORAL at 08:34

## 2019-06-27 RX ADMIN — INSULIN LISPRO 6 UNITS: 100 INJECTION, SOLUTION INTRAVENOUS; SUBCUTANEOUS at 11:48

## 2019-06-27 RX ADMIN — GABAPENTIN 300 MG: 300 CAPSULE ORAL at 16:17

## 2019-06-27 RX ADMIN — Medication 2 TABLET: at 08:34

## 2019-06-27 RX ADMIN — INSULIN LISPRO 3 UNITS: 100 INJECTION, SOLUTION INTRAVENOUS; SUBCUTANEOUS at 11:48

## 2019-06-27 RX ADMIN — INSULIN GLARGINE 15 UNITS: 100 INJECTION, SOLUTION SUBCUTANEOUS at 21:40

## 2019-06-27 RX ADMIN — SIMVASTATIN 40 MG: 40 TABLET, FILM COATED ORAL at 21:40

## 2019-06-27 RX ADMIN — INSULIN LISPRO 6 UNITS: 100 INJECTION, SOLUTION INTRAVENOUS; SUBCUTANEOUS at 08:36

## 2019-06-27 RX ADMIN — CEFTRIAXONE SODIUM 2 G: 2 INJECTION, POWDER, FOR SOLUTION INTRAMUSCULAR; INTRAVENOUS at 18:16

## 2019-06-27 NOTE — PROGRESS NOTES
Community Care Team Documentation for Patient in [facility]     Patient discharged from  [facility] to [facility], on 6/6/19. Hospital Discharge diagnosis:     1. Sepsis    2. Osteomyelitis of right foot    3. Left arm weakness   4. Right foot ulcer, with necrosis of muscle     SNF Attending Provider:  Dr. Taylor Jane    Anticipated discharge date from SNF: 6/28/19     PCP : Jorge Luis Beth MD    Nurse Navigator:     [facility] Team rounds completed, updates provided by facility. He will going to the infusion center to continue his antibiotics until they are finished. RRAT:  Low Risk            7       Total Score        3 Has Seen PCP in Last 6 Months (Yes=3, No=0)    4 Charlson Comorbidity Score (Age + Comorbid Conditions)        Criteria that do not apply:    . Living with Significant Other. Assisted Living. LTAC. SNF. or   Rehab    Patient Length of Stay (>5 days = 3)    IP Visits Last 12 Months (1-3=4, 4=9, >4=11)    Pt.  Coverage (Medicare=5 , Medicaid, or Self-Pay=4)          Active Ambulatory Problems     Diagnosis Date Noted    Right foot ulcer, with necrosis of muscle ([address] 10/18/2018    Diabetic polyneuropathy ([address] 10/18/2018    Right foot ulcer, with fat layer exposed ([address] 10/18/2018    Osteomyelitis of right foot ([address] 05/29/2019    Sepsis ([address] 05/29/2019    Left arm weakness 05/29/2019     Resolved Ambulatory Problems     Diagnosis Date Noted    No Resolved Ambulatory Problems     Past Medical History:   Diagnosis Date    Diabetes ([address]

## 2019-06-27 NOTE — PROGRESS NOTES
Nutrition follow-up/Plan of care tcc      RECOMMENDATIONS:   1. Consistent CHO Diet with HS snack  2. Monitor labs, weight and PO intake  3. RD to follow     GOALS:   1. Met/Ongoing: PO intake meets >75% of protein/calorie needs by 7/4   2. Met/Ongoing: Weight Maintenance (+/- 1-2 lb) by 7/4    ASSESSMENT:   Wt status is classified as overweight per Body mass index is 25.55 kg/m². Noted weight gain a 15 lb or 10.5% from admission weight, but variability in weights recorded so question exact accuracy. Adequate PO intake. Labs noted. BG range () over the past 24 hours; A1c (8.5). Pt w/ elevated BUN. Glycemic control team is following. Nutrition recommendations listed. RD to follow. Nutrition Risk:  [] High  [] Moderate [x]  Low    SUBJECTIVE/OBJECTIVE:   (6/27): Pt has a good appetite and consumes most of his meals. Noted weight gain a 15 lb or 10.5% from admission weight, but variability in weights recorded so question exact accuracy. Last BM on (6/27) per I/Os. Pt seen in room and is doing well. He has been educated on DM management multiple times by glycemic control and plan is to D/C tomorrow. (6/20): Last BM on (6/17) per I/Os. Noted an 11 lb or 7.7% from admission weight; variability in weights recorded ?? accuracy. Pt seen in room sitting in chair finishing up lunch; observed 100% of meal consumed. Reports he is doing well with his intake but concerned about his high BG readings. Assured him glycemic control has been monitoring and making adjustments. Inquired as to what he had eaten last night d/t hyperglycemia and found to have eaten 2 meals. Had d/w glycemic control and they also went to visit him to explain medications. Will continue to monitor. (6/13): Pt has a good appetite and usually consumes 100% of his meals. Pt seen in room OOB in chair finishing his lunch; observed 95% of meal consumed. Reports his appetite is good and he does snack between meals sometimes.  Discussed again the importance of the HS snack and enforced him asking nursing in the evenings as well as if he is feeling symptomatic from hypoglycemia. Also followed up with dietary/nursing to ensure he is receiving it. Pt BG is known to be complicated to manage with large fluctuations in readings; glycemic control team is following and making adjustments as needed. Last BM on (6/12) per I/Os. Will continue to follow.     (6/7): [facility] being [facility] for sepsis and a right foot ulcer. Pt seen in room OOB in chair later in the day. Reports he has a good appetite and doing well. Provided a menu and encouraged to implement preferences with dietary. Also re ordered HS snack. Will continue to monitor. Below per previous RD note:   (6/4): Admitted with right foot ulcer. Has history of diabetes. S/P right 5th metatarsal head and base of phalanx resection on (6/2). Patient reports having a good appetite and is eating all of meals. 100% intake of lunch meal today and 100% of all meals per vitals. Noted [id number] Kcal diet ordered. Will adjust to 2000 Kcal to meet nutrition needs. States weight has been stable PTA and UBW is between 145-150 lb. No complaints.  Will monitor.      Information Obtained from:    [x] Chart Review   [x] Patient   [] Family/Caregiver   [x] Nurse/Physician   [x] Interdisciplinary Meeting/Rounds      Diet: Consistent CHO  Medications: [x] Reviewed    Allergies: [x] Reviewed   Patient Active Problem List   Diagnosis Code    Right foot ulcer, with necrosis of muscle ([address] L97.513    Diabetic polyneuropathy ([address] E11.42    Right foot ulcer, with fat layer exposed ([address] L97.512    Osteomyelitis of right foot ([address] M86.9    Sepsis ([address] A41.9    Left arm weakness R29.898       Past Medical History:   Diagnosis Date    Diabetes ([address]       Labs:    Lab Results   Component Value Date/Time    Sodium 139 06/24/2019 04:35 AM    Potassium 4.2 06/24/2019 04:35 AM    Chloride 102 06/24/2019 04:35 AM    CO2 33 (H) 06/24/2019 04:35 AM    Anion gap 4 06/24/2019 04:35 AM    Glucose 217 (H) 06/24/2019 04:35 AM    BUN 27 (H) 06/24/2019 04:35 AM    Creatinine 1.10 06/24/2019 04:35 AM    Calcium 8.9 06/24/2019 04:35 AM    Magnesium 2.3 06/03/2019 04:38 AM    Phosphorus 4.0 06/03/2019 04:38 AM    Albumin 3.0 (L) 05/30/2019 05:30 AM     Anthropometrics: BMI (calculated): 25.6  Last 3 Recorded Weights in this Encounter    06/11/19 1346 06/17/19 1458 06/25/19 1427   Weight: 66 kg (145 lb 6.4 oz) 70.1 kg (154 lb 9.6 oz) 71.8 kg (158 lb 4.8 oz)      Ht Readings from Last 1 Encounters:   05/29/19 5' 6\" (1.676 m)     Weight Metrics 6/25/2019 6/3/2019 2/22/2013   Weight 158 lb 4.8 oz 145 lb 4.8 oz 150 lb   BMI 25.55 kg/m2 23.45 kg/m2 23.49 kg/m2       No data found. UBW: 145-150 lb   [] Weight Loss  [x] Weight Gain (15 lb or 10.5% from admission weight; variability in weights recorded ? ?accuracy)  [] Weight Stable PTA    Estimated Nutrition Needs: [x] MSJ  [] Other:  Calories: 1928 kcal Based on:   [x] Actual BW    Protein:   78 g Based on:   [x] Actual BW    Fluid:       2000 ml Based on:   [x] Actual BW       Nutrition Problems Identified:   [] Suboptimal PO intake   [] Food Allergies  [] Difficulty chewing/swallowing/poor dentition  [] Constipation/Diarrhea (Last BM on 6/27; bowel regimen in place)   [] Nausea/Vomiting   [] None  [x] Other: Wound healing    Plan:   [x] Therapeutic Diet  [x]  Obtained/adjusted food preferences/tolerances and/or snacks options   []  Supplements added   [] Occupational therapy following for feeding techniques  [x]  HS snack added   []  Modify diet texture   []  Modify diet for food allergies   [x]  Educate patient (completed by glycemic control)   []  Assist with menu selection   [x]  Monitor PO intake on meal rounds   [x]  Continue inpatient monitoring and intervention   [x]  Participated in discharge planning/Interdisciplinary rounds/Team meetings   []  Other: Education Needs:   [] Not appropriate for teaching at this time due to:   [x] Identified and addressed    Nutrition Monitoring and Evaluation:  [x] Continue ongoing monitoring and intervention  [] Other    Malika Pallas

## 2019-06-27 NOTE — PROGRESS NOTES
Received call from Shanae Klein at [place] and all his rides have been scheduled for July 1-July 14.

## 2019-06-27 NOTE — PROGRESS NOTES
IDT team, DON, MDS, Rehab Manager, Dietician, /, , met and reviewed patients medication, diet, therapy, nursing needs, discharge plans, and overall progress. Concerns identified and addressed to meet patients needs. Will continue to follow.

## 2019-06-27 NOTE — PROGRESS NOTES
Infectious Disease Follow-up Note      Date of Admission: 6/6/2019     Date of Note:  6/27/2019      Summary:     38 y/o AAm w/DM, HTN chronic R lateral plantar ulcer admitted to [facility] for dorsolateral R foot ulcer and OM 5th MT, transferred to [facility] 6/6. BSI group C Streptococci 1 of 2 on 5/29. S/p rsxn R 5th MTH, base proximal phalanx, 2nd pass bx 5th MT, and flap closure on 6/2 bone cx no growth. 2nd pass prox phalanx: chronic osteomyelitis. S/p 6 days vancomycin,  zosyn, then Ceftriaxone 6/4 and transferred to [facility] 6/6. Interval History:     Remains afebrile and free of nausea or diarrhea. No new complaints. PICC functioning well    Current Antimicrobials: Prior Antimicrobials   Ceftriaxone 6/4 - 23  Post amp 6/2 - 25  Vancom, zosyn 5/29 - 6       Assessment / Plan:      Osteomyelitis, right 5th metatarsal  - acute.  Secondary to new dorsolateral ulcer  - xray 5/29: lytic changes distal 5th metatarsal  - MRI 5/31: OM distal 5th MT, base 5th prox phalanx, septic arthritis 5th MTP  - s/p Resxn R 5th MTH, base prox phalanx. 2nd pass bx 5th MT, flap closure 6/2  cx NGTD  - 2nd pass prox phalanx: chronic osteomyelitis  - Podiatry following -> continue Ceftriaxone 2 gm IV q 24 x 6 weeks (end date: 7/14/2019).   Can continue at OP infusion if necessary  -> continue non-weight bearing per Podiatry      Positive blood culture GC Strep  - 1 of 2 blcx 5/29 Group C Streptococcus  - unclear significance (contaminant vs from OM wound infection)   - covered by current abx     Chronic non-healing ulcer, lateral plantar, R foot     DM     HTN        Microbiology:   5/29      blcx IP x2     Lines / Catheters:   R PICC    Patient Active Problem List   Diagnosis Code    Right foot ulcer, with necrosis of muscle ([address] L97.513    Diabetic polyneuropathy ([address] E11.42    Right foot ulcer, with fat layer exposed ([address] L97.512    Osteomyelitis of right foot ([address] M86.9    Sepsis ([address] A41.9    Left arm weakness R29.898 Current Facility-Administered Medications   Medication Dose Route Frequency    [facility] TCC ANESTHESIA   Other PRN    [facility] TCC EMERGENCY/STAT BOX   Other PRN    insulin lispro (HUMALOG) injection   SubCUTAneous TIDAC    insulin lispro (HUMALOG) injection 6 Units  6 Units SubCUTAneous TID WITH MEALS    insulin glargine (LANTUS) injection 15 Units  15 Units SubCUTAneous BID    docusate sodium (COLACE) capsule 100 mg  100 mg Oral DAILY    Lactobacillus Acidoph & Bulgar (FLORANEX) tablet 2 Tab  2 Tab Oral BID    magnesium hydroxide (MILK OF MAGNESIA) 400 mg/5 mL oral suspension 30 mL  30 mL Oral DAILY PRN    bisacodyl (DULCOLAX) suppository 10 mg  10 mg Rectal DAILY PRN    cefTRIAXone (ROCEPHIN) 2 g in 0.9% sodium chloride (MBP/ADV) 50 mL MBP  2 g IntraVENous Q24H    calcium-vitamin D 600 mg(1,500mg) -200 unit per tablet 1 Tab  1 Tab Oral BID WITH MEALS    gabapentin (NEURONTIN) capsule 300 mg  300 mg Oral TID    simvastatin (ZOCOR) tablet 40 mg  40 mg Oral QHS    aspirin chewable tablet 81 mg  81 mg Oral DAILY    glucagon (GLUCAGEN) injection 1 mg  1 mg IntraMUSCular PRN    glucose chewable tablet 16 g  4 Tab Oral PRN    dextrose 10 % infusion 125-250 mL  125-250 mL IntraVENous PRN     Facility-Administered Medications Ordered in Other Encounters   Medication Dose Route Frequency    [START ON 2019] cefTRIAXone (ROCEPHIN) 2 g in sterile water (preservative free) 20 mL IV syringe  2 g IntraVENous ONCE    [START ON 2019] cefTRIAXone (ROCEPHIN) 2 g in sterile water (preservative free) 20 mL IV syringe  2 g IntraVENous ONCE         Review of Systems - Negative except as in interval history       Objective:     Visit Vitals  BP 97/64   Pulse (!) 104   Temp 97.5 °F (36.4 °C)   Resp 18   Wt 71.8 kg (158 lb 4.8 oz)   SpO2 100%   BMI 25.55 kg/m²       Temp (24hrs), Av.6 °F (36.4 °C), Min:97.5 °F (36.4 °C), Max:97.6 °F (36.4 °C)      General: Well developed, well nourished 39 y.o.   male in no acute distress. ENT: ENT exam normal, no neck nodes or sinus tenderness  Head: normocephalic, without obvious abnormality  Mouth:  mucous membranes moist, pharynx normal without lesions  Neck: supple, symmetrical, trachea midline   Cardio:  regular rate and rhythm, S1, S2 normal, no murmur, click, rub or gallop  Chest: inspection normal - no chest wall deformities or tenderness, respiratory effort normal  Lungs: clear to auscultation and no wheezes or rales  Abdomen: soft, non-tender. Bowel sounds normal. No masses, no organomegaly. Extremities:  no redness or tenderness in the calves or thighs, no edema, right lateral foot wound healing well, sutures removed    Lab results     Chemistry  No results for input(s): GLU, NA, K, CL, CO2, BUN, CREA, CA, AGAP, BUCR, TBIL, GPT, AP, TP, ALB, GLOB, AGRAT in the last 72 hours. CBC w/ Diff  No results for input(s): WBC, RBC, HGB, HCT, PLT, GRANS, LYMPH, EOS, HGBEXT, HCTEXT, PLTEXT in the last 72 hours.     Microbiology  All Micro Results     None             Xi Murphy MD, [facility]  Infectious Disease Specialist  Pager [phone]

## 2019-06-27 NOTE — PROGRESS NOTES
Mr. Briseida Kendrick is eager to discharge home. He is satisfied with his independent activities in his room.

## 2019-06-28 ENCOUNTER — HOSPITAL ENCOUNTER (OUTPATIENT)
Dept: INFUSION THERAPY | Age: 44
Discharge: HOME | End: 2019-06-28
Payer: MEDICARE

## 2019-06-28 VITALS
TEMPERATURE: 99 F | OXYGEN SATURATION: 100 % | HEART RATE: 109 BPM | RESPIRATION RATE: 18 BRPM | DIASTOLIC BLOOD PRESSURE: 73 MMHG | SYSTOLIC BLOOD PRESSURE: 115 MMHG

## 2019-06-28 VITALS
OXYGEN SATURATION: 97 % | HEART RATE: 98 BPM | BODY MASS INDEX: 25.55 KG/M2 | SYSTOLIC BLOOD PRESSURE: 114 MMHG | DIASTOLIC BLOOD PRESSURE: 73 MMHG | RESPIRATION RATE: 18 BRPM | WEIGHT: 158.3 LBS | TEMPERATURE: 97.1 F

## 2019-06-28 LAB
GLUCOSE BLD STRIP.AUTO-MCNC: 222 MG/DL (ref 70–110)
GLUCOSE BLD STRIP.AUTO-MCNC: 280 MG/DL (ref 70–110)

## 2019-06-28 PROCEDURE — 74011250637 HC RX REV CODE- 250/637: Performed by: INTERNAL MEDICINE

## 2019-06-28 PROCEDURE — 82962 GLUCOSE BLOOD TEST: CPT

## 2019-06-28 PROCEDURE — 74011636637 HC RX REV CODE- 636/637: Performed by: INTERNAL MEDICINE

## 2019-06-28 PROCEDURE — 74011000258 HC RX REV CODE- 258: Performed by: NURSE PRACTITIONER

## 2019-06-28 PROCEDURE — 96365 THER/PROPH/DIAG IV INF INIT: CPT

## 2019-06-28 PROCEDURE — 74011250637 HC RX REV CODE- 250/637: Performed by: NURSE PRACTITIONER

## 2019-06-28 PROCEDURE — 74011636637 HC RX REV CODE- 636/637: Performed by: NURSE PRACTITIONER

## 2019-06-28 PROCEDURE — 74011250636 HC RX REV CODE- 250/636: Performed by: NURSE PRACTITIONER

## 2019-06-28 RX ORDER — DOCUSATE SODIUM 100 MG/1
100 CAPSULE, LIQUID FILLED ORAL DAILY
Qty: 30 CAP | Refills: 0 | Status: SHIPPED | OUTPATIENT
Start: 2019-06-29 | End: 2019-09-27

## 2019-06-28 RX ORDER — INSULIN LISPRO 100 [IU]/ML
6 INJECTION, SOLUTION INTRAVENOUS; SUBCUTANEOUS
Qty: 10 PEN | Refills: 0 | Status: SHIPPED | OUTPATIENT
Start: 2019-06-28

## 2019-06-28 RX ORDER — HEPARIN 100 UNIT/ML
500 SYRINGE INTRAVENOUS ONCE
Status: COMPLETED | OUTPATIENT
Start: 2019-06-28 | End: 2019-06-28

## 2019-06-28 RX ORDER — INSULIN GLARGINE 100 [IU]/ML
15 INJECTION, SOLUTION SUBCUTANEOUS 2 TIMES DAILY
Qty: 10 ADJUSTABLE DOSE PRE-FILLED PEN SYRINGE | Refills: 0 | Status: SHIPPED | OUTPATIENT
Start: 2019-06-28 | End: 2019-06-28 | Stop reason: SDUPTHER

## 2019-06-28 RX ORDER — SODIUM CHLORIDE 0.9 % (FLUSH) 0.9 %
10-40 SYRINGE (ML) INJECTION AS NEEDED
Status: DISCONTINUED | OUTPATIENT
Start: 2019-06-28 | End: 2019-07-02 | Stop reason: HOSPADM

## 2019-06-28 RX ORDER — INSULIN GLARGINE 100 [IU]/ML
15 INJECTION, SOLUTION SUBCUTANEOUS 2 TIMES DAILY
Qty: 10 ADJUSTABLE DOSE PRE-FILLED PEN SYRINGE | Refills: 0 | Status: SHIPPED | OUTPATIENT
Start: 2019-06-28

## 2019-06-28 RX ORDER — PEN NEEDLE, DIABETIC 30 GX3/16"
NEEDLE, DISPOSABLE MISCELLANEOUS 3 TIMES DAILY
Qty: 90 PACKAGE | Refills: 0 | Status: SHIPPED | OUTPATIENT
Start: 2019-06-28

## 2019-06-28 RX ORDER — GABAPENTIN 300 MG/1
300 CAPSULE ORAL 3 TIMES DAILY
Qty: 90 CAP | Refills: 0 | Status: SHIPPED | OUTPATIENT
Start: 2019-06-28

## 2019-06-28 RX ORDER — UREA 10 %
2 LOTION (ML) TOPICAL 2 TIMES DAILY
Qty: 120 TAB | Refills: 0 | Status: SHIPPED | OUTPATIENT
Start: 2019-06-28

## 2019-06-28 RX ORDER — INSULIN LISPRO 100 [IU]/ML
INJECTION, SOLUTION INTRAVENOUS; SUBCUTANEOUS
Qty: 1 PACKAGE | Refills: 0 | Status: SHIPPED | OUTPATIENT
Start: 2019-06-28

## 2019-06-28 RX ORDER — MAGNESIUM SULFATE 100 %
4 CRYSTALS MISCELLANEOUS AS NEEDED
Qty: 30 TAB | Refills: 0 | Status: SHIPPED | OUTPATIENT
Start: 2019-06-28

## 2019-06-28 RX ORDER — INSULIN LISPRO 100 [IU]/ML
7 INJECTION, SOLUTION INTRAVENOUS; SUBCUTANEOUS
Status: DISCONTINUED | OUTPATIENT
Start: 2019-06-28 | End: 2019-06-28 | Stop reason: HOSPADM

## 2019-06-28 RX ADMIN — INSULIN LISPRO 9 UNITS: 100 INJECTION, SOLUTION INTRAVENOUS; SUBCUTANEOUS at 08:59

## 2019-06-28 RX ADMIN — Medication 2 TABLET: at 08:56

## 2019-06-28 RX ADMIN — Medication 10 ML: at 15:00

## 2019-06-28 RX ADMIN — INSULIN GLARGINE 15 UNITS: 100 INJECTION, SOLUTION SUBCUTANEOUS at 08:58

## 2019-06-28 RX ADMIN — Medication 10 ML: at 14:20

## 2019-06-28 RX ADMIN — CEFTRIAXONE 2 G: 2 INJECTION, POWDER, FOR SOLUTION INTRAMUSCULAR; INTRAVENOUS at 14:21

## 2019-06-28 RX ADMIN — Medication 500 UNITS: at 15:00

## 2019-06-28 RX ADMIN — INSULIN LISPRO 6 UNITS: 100 INJECTION, SOLUTION INTRAVENOUS; SUBCUTANEOUS at 08:59

## 2019-06-28 RX ADMIN — Medication 1 TABLET: at 08:57

## 2019-06-28 RX ADMIN — ASPIRIN 81 MG 81 MG: 81 TABLET ORAL at 08:57

## 2019-06-28 RX ADMIN — INSULIN LISPRO 7 UNITS: 100 INJECTION, SOLUTION INTRAVENOUS; SUBCUTANEOUS at 12:34

## 2019-06-28 RX ADMIN — GABAPENTIN 300 MG: 300 CAPSULE ORAL at 08:57

## 2019-06-28 RX ADMIN — INSULIN LISPRO 6 UNITS: 100 INJECTION, SOLUTION INTRAVENOUS; SUBCUTANEOUS at 12:34

## 2019-06-28 RX ADMIN — DOCUSATE SODIUM 100 MG: 100 CAPSULE, LIQUID FILLED ORAL at 08:57

## 2019-06-28 NOTE — PROGRESS NOTES
OPIC Progress Note    Date: 2019    Name: Kosta Velazquez    MRN: [id number]         : 1975    Antibiotic Infusion    Mr. Handy to [facility], ambulatory using scooter at 1400 accompanied by self. Pt was assessed and education was provided. Mr. Addis Sykes vitals were reviewed. Visit Vitals  /73 (BP 1 Location: Left arm, BP Patient Position: Sitting)   Pulse (!) 109   Temp 99 °F (37.2 °C)   Resp 18   SpO2 100%       right upper arm PICC flushed easily and had brisk blood return via single ports. PICC dressing c/d/i and not due to be changed. No swelling, redness, streaking, warmth or drainage noted in arm. Pt denied c/o pain in arm.      []  Vancomycin     []  Invanz     []  Cubicin     [x]  Rocephin 2g    was infused at 100 ml/hr (over approximately 30 minutes). PICC dressing and stat lock changed under sterile technique after cleansing site with chlorhexidine. Biopatch and skin protectant applied, and microclaves changed. No redness, streaking, warmth, or drainage noted at site. Mr. Monique Gómez tolerated infusion, and had no complaints at this time. single lumens of PICC flushed with NS 10 ml and Heparin 250 units. Green end caps applied, and lumens wrapped with guaze and paper tape. Stockinette placed over dressing for protection. Patient armband removed and shredded. Mr. Monique Gómez was discharged from [place] [id number] in stable condition at 1505. He is to return on [facility] 2019 at 0830 for his next antibiotic appointment.     Jessy Gore RN  2019

## 2019-06-28 NOTE — ROUTINE PROCESS
Patient sitting in wheelchair, watching tv, midnight snack box, ham sandwich, crackers, juice & pudding,  given to patient

## 2019-06-28 NOTE — ROUTINE PROCESS
Prescriptions, AVS summary appointment reminders given to patient and he responded he understood. Unable to do electronic signatures due to non functioning pen pad. Patient reported he had his cell phone and tablet and , and eye glasses. Reports no further valuables. Discharged to home, [facility] borrowed scooter,Escorted to infusion clinic where patient will have family pick him up.                                                           A  vs

## 2019-06-28 NOTE — DISCHARGE SUMMARY
[address] [place] [address]   Discharge Summary    Patient: Sabi Colorado MRN: [id number]  CSN: [id number]    YOB: 1975  Age: 38 y.o. Sex: male    DOA: 6/6/2019 LOS:  LOS: 22 days   Discharge Date:      Admission Diagnoses: sepsis    Discharge Diagnoses:    Problem List as of 6/28/2019 Date Reviewed: 6/2/2019          Codes Class Noted - Resolved    Osteomyelitis of right foot ([facility] ICD-10-CM: M86.9  ICD-9-CM: 730.27  5/29/2019 - Present        Sepsis ([facility] ICD-10-CM: A41.9  ICD-9-CM: 038.9, 995.91  5/29/2019 - Present        Left arm weakness ICD-10-CM: R29.898  ICD-9-CM: 729.89  5/29/2019 - Present        Right foot ulcer, with necrosis of muscle ([facility] ICD-10-CM: L97.513  ICD-9-CM: 707.15  10/18/2018 - Present        Diabetic polyneuropathy ([facility] ICD-10-CM: E11.42  ICD-9-CM: 250.60, 357.2  10/18/2018 - Present        Right foot ulcer, with fat layer exposed ([facility] ICD-10-CM: X76.286  ICD-9-CM: 707.15  10/18/2018 - Present              Discharge Condition: Good    Discharge To: Home with [address] Course:    Mr. Ruiz Jiang is a 39 yr old  Male patient. Patient recently hospitalized from 5/29-6/6.  Patient was seen in the Er for eval of chronic right lateral plantar ulcer. Patient was found to have new dorsolateral right foot OM on XR. MRI then confirm OM. He was seen by podiatry and they had   resection 5th metatarsal head along with base of prox phalanx, second pass bone biopsies of the 5th metatarsal and proximal phalanx, and flap closure on 6/2. Second pass prox phalanx bone biopsy findings were positive for chronic osteomyelitis.  He was seen by ID and they recommended 6 weeks of IV Rocephin vis PICC line. His BC did come back + for group C Streptococcus on 5/29, per ID possibel contaminant vs from OM wound infection,, continue ivabx.  Patient improved and sent to [facility] for rehab.     6/28/2019  Since admitted noted hypotensive episode, bp in the 80s. he is alert and verbal, he denies any nvd, fever or chills. He BP is stable today, patient did required IVfs 2 bags, for mild dehydration which may have been causing hypotension episode. He was encourage to increase po fluid intake. Patient report having bm, no issues. He report he tolerated Pt/ot, using scooter to move around. Appetiet is good, BS reviewed improving.  During his stay he has had few hypoglycemic episode requirement intervention, which has now improved. Patient is encourage to have bedtime snacks. Patient denies any issues. PICC line to E is wnl. Patient was been followed by Podiatry and Infectious disease. Plans to have patient continue Ceftriaxone 2 gm IV q 24 out patient till 7/14 and to continue non- weight bearing to E.  Patient is stable for discharge, he will be going to transfusion center at 2 pm.         PAST MEDICAL HISTORY Insulin-dependent diabetes, hypertension.     PAST SURGICAL HISTORY:  Surgery on the left leg for fracture.     ALLERGIES:  TO IV CONTRAST.     CURRENT MEDICATIONS:  Medication list reviewed.     SOCIAL HISTORY:  He admits to smoking and drinking occasionally.     FAMILY HISTORY:  Positive for diabetes.     REVIEW OF SYSTEMS:  No fever or chills.  No weight loss or headache.  No chest pain or palpitation.  No shortness of breath or cough.  No nausea, vomiting, diarrhea.  No dysuria or polyuria.  No back pain or leg pain.  No heat or cold intolerance.  No anxiety or depression.           Objective:      Visit Vitals  /75   Pulse 97   Temp 98.6 °F (37 °C)   Resp 18   Wt 71.8 kg (158 lb 4.8 oz)   SpO2 99%   BMI 25.55 kg/m²         Physical Exam:  General appearance: alert, cooperative, no distress, appears stated age  HEENT: negative  Neck: supple, symmetrical, trachea midline, no adenopathy, thyroid: not enlarged, symmetric, no tenderness/mass/nodules, no carotid bruit and no JVD  Lungs: clear to auscultation bilaterally  Heart: regular rate and rhythm, S1, S2 normal, no murmur, click, rub or gallop  Abdomen: soft, non-tender. Bowel sounds normal. No masses,  no organomegaly  Pulses: 2+ and symmetric  Skin: right foot dressing in place    Consults: Infectious Disease    Significant Diagnostic Studies: labs: see below     Discharge Medications:     Current Discharge Medication List      START taking these medications    Details   Lactobacillus Acidoph & Bulgar (FLORANEX) 1 million cell tab tablet Take 2 Tabs by mouth two (2) times a day. Qty: 120 Tab, Refills: 0      glucose 4 gram chewable tablet Take 4 Tabs by mouth as needed (hypoglycemia). Qty: 30 Tab, Refills: 0      docusate sodium (COLACE) 100 mg capsule Take 1 Cap by mouth daily for 90 days. Qty: 30 Cap, Refills: 0      insulin lispro (HUMALOG KWIKPEN INSULIN) 100 unit/mL kwikpen 6 Units by SubCUTAneous route Before breakfast, lunch, and dinner. Qty: 10 Pen, Refills: 0      Insulin Needles, Disposable, 31 gauge x 5/16\" ndle by SubCUTAneous route three (3) times daily. Qty: 90 Package, Refills: 0      cefTRIAXone 2 gram 2 g IVPB - will be given Out patient- ending 7/14   CONTINUE these medications which have CHANGED    Details   insulin glargine (BASAGLAR KWIKPEN U-100 INSULIN) 100 unit/mL (3 mL) inpn 15 Units by SubCUTAneous route two (2) times a day. Qty: 10 Adjustable Dose Pre-filled Pen Syringe, Refills: 0      gabapentin (NEURONTIN) 300 mg capsule Take 1 Cap by mouth three (3) times daily. Indications: Neuropathic Pain  Qty: 90 Cap, Refills: 0         CONTINUE these medications which have NOT CHANGED    Details   calcium-cholecalciferol, d3, (CALCIUM 600 + D) 600-125 mg-unit tab Take  by mouth. simvastatin (ZOCOR) 20 mg tablet Take 40 mg by mouth nightly. aspirin 81 mg chewable tablet Take 81 mg by mouth daily.  Indications: MYOCARDIAL INFARCTION PREVENTION         STOP taking these medications        Comments:   Reason for Stopping:         insulin lispro (HUMALOG) 100 unit/mL injection Comments:   Reason for Stopping: insulin aspart (NOVOLOG) 100 unit/mL injection Comments:   Reason for Stopping:             Results for Maricruz Arellano (MRN [id number]) as of 6/28/2019 09:55   Ref. Range 6/24/2019 04:35   WBC Latest Ref Range: 4.6 - 13.2 K/uL 4.2 (L)   RBC Latest Ref Range: 4.70 - 5.50 M/uL 3.49 (L)   HGB Latest Ref Range: 13.0 - 16.0 g/dL 9.9 (L)   HCT Latest Ref Range: 36.0 - 48.0 % 32.6 (L)   MCV Latest Ref Range: 74.0 - 97.0 FL 93.4   MCH Latest Ref Range: 24.0 - 34.0 PG 28.4   MCHC Latest Ref Range: 31.0 - 37.0 g/dL 30.4 (L)   RDW Latest Ref Range: 11.6 - 14.5 % 12.6   PLATELET Latest Ref Range: 135 - 420 K/uL 218   MPV Latest Ref Range: 9.2 - 11.8 FL 10.6   NEUTROPHILS Latest Ref Range: 40 - 73 % 44   LYMPHOCYTES Latest Ref Range: 21 - 52 % 43   MONOCYTES Latest Ref Range: 3 - 10 % 8   EOSINOPHILS Latest Ref Range: 0 - 5 % 4   BASOPHILS Latest Ref Range: 0 - 2 % 1   DF Latest Units:   AUTOMATED   ABS. NEUTROPHILS Latest Ref Range: 1.8 - 8.0 K/UL 1.8   ABS. LYMPHOCYTES Latest Ref Range: 0.9 - 3.6 K/UL 1.8   ABS. MONOCYTES Latest Ref Range: 0.05 - 1.2 K/UL 0.3   ABS. EOSINOPHILS Latest Ref Range: 0.0 - 0.4 K/UL 0.2   ABS. BASOPHILS Latest Ref Range: 0.0 - 0.1 K/UL 0.0     Results for Maricruz Arellano (MRN [id number]) as of 6/28/2019 09:55   Ref.  Range 6/24/2019 04:35   Sodium Latest Ref Range: 136 - 145 mmol/L 139   Potassium Latest Ref Range: 3.5 - 5.5 mmol/L 4.2   Chloride Latest Ref Range: 100 - 108 mmol/L 102   CO2 Latest Ref Range: 21 - 32 mmol/L 33 (H)   Anion gap Latest Ref Range: 3.0 - 18 mmol/L 4   Glucose Latest Ref Range: 74 - 99 mg/dL 217 (H)   BUN Latest Ref Range: 7.0 - 18 MG/DL 27 (H)   Creatinine Latest Ref Range: 0.6 - 1.3 MG/DL 1.10   BUN/Creatinine ratio Latest Ref Range: 12 - 20   25 (H)   Calcium Latest Ref Range: 8.5 - 10.1 MG/DL 8.9   GFR est non-AA Latest Ref Range: >60 ml/min/1.73m2 >60   GFR est AA Latest Ref Range: >60 ml/min/1.73m2 >60     ASSESSMENT/PLANS    New dorsolateral right foot OM:  patient is s/p resection 5th metatarsal head along with base of prox phalanx, second pass bone biopsies of the 5th metatarsal and proximal phalanx, and flap closure on 6/2. Patient to follow up with [facility]. Continue Ivbx per ID recommendation or 6 weeks via PICC line. Continue wound care. Patient to continue IV abx OP till 7/14. Patient will be going to the transfusion center, all set by [address] worker along with transportation.        DM type 2:  hgba1c is 8.5 , BS not controlled, continue Lantus to 15 units bid and meal insulin 6 units tid, will continue monitor for hypoglycemia, encourage bedtime snack.      Hypotension episode probable from dehydration - he is not on any bp meds, he is asymptomatic. received  ivf times 2 days ago, encourage to increase po fluid intake and monitor. BP improved     Hyperlipidemia: continue Zocor     Left arm weakness: noted from previous neck injury.      Constipation : resolved continue bowel regiment and monitor       Home Health: per social no HH services   Activity:  NWB to RLE  Diet: Diabetic Diet  Wound Care: As directed, Local wound care with Betadine to right foot. Follow-up:  Follow up with MD Anthony FENTON on 7/2. Md Griffiths on 7/8 and [place] on 7/3.      Time spent > 30 mins  Rome Bautista NP  6/28/2019, 10:06 AM   .

## 2019-06-28 NOTE — PROGRESS NOTES
Patient to discharge with Outpatient Infusion. He was provided with the newsletter but will discharge prior to the movie.

## 2019-06-29 ENCOUNTER — HOSPITAL ENCOUNTER (OUTPATIENT)
Dept: INFUSION THERAPY | Age: 44
Discharge: HOME | End: 2019-06-29
Payer: MEDICARE

## 2019-06-30 ENCOUNTER — HOSPITAL ENCOUNTER (OUTPATIENT)
Dept: INFUSION THERAPY | Age: 44
Discharge: HOME | End: 2019-06-30
Payer: MEDICARE

## 2019-06-30 VITALS
TEMPERATURE: 97.9 F | OXYGEN SATURATION: 98 % | RESPIRATION RATE: 18 BRPM | DIASTOLIC BLOOD PRESSURE: 69 MMHG | HEART RATE: 98 BPM | SYSTOLIC BLOOD PRESSURE: 106 MMHG

## 2019-06-30 PROCEDURE — 96374 THER/PROPH/DIAG INJ IV PUSH: CPT

## 2019-06-30 PROCEDURE — 74011000250 HC RX REV CODE- 250: Performed by: NURSE PRACTITIONER

## 2019-06-30 PROCEDURE — 74011250636 HC RX REV CODE- 250/636

## 2019-06-30 PROCEDURE — 74011250636 HC RX REV CODE- 250/636: Performed by: NURSE PRACTITIONER

## 2019-06-30 RX ORDER — HEPARIN 100 UNIT/ML
500 SYRINGE INTRAVENOUS AS NEEDED
Status: DISCONTINUED | OUTPATIENT
Start: 2019-06-30 | End: 2019-07-04 | Stop reason: HOSPADM

## 2019-06-30 RX ORDER — HEPARIN 100 UNIT/ML
SYRINGE INTRAVENOUS
Status: COMPLETED
Start: 2019-06-30 | End: 2019-06-30

## 2019-06-30 RX ORDER — SODIUM CHLORIDE 0.9 % (FLUSH) 0.9 %
10-40 SYRINGE (ML) INJECTION AS NEEDED
Status: DISCONTINUED | OUTPATIENT
Start: 2019-06-30 | End: 2019-07-04 | Stop reason: HOSPADM

## 2019-06-30 RX ADMIN — CEFTRIAXONE SODIUM 2 G: 2 INJECTION, POWDER, FOR SOLUTION INTRAMUSCULAR; INTRAVENOUS at 07:54

## 2019-06-30 RX ADMIN — HEPARIN 500 UNITS: 100 SYRINGE at 07:55

## 2019-06-30 RX ADMIN — Medication 20 ML: at 07:55

## 2019-06-30 NOTE — PROGRESS NOTES
[facility] Progress Note    Date: 2019    Name: Oriana Teresa    MRN: [id number]         : 1975    Antibiotic Infusion    Mr. Handy to [facility], ambulatory using scooter at [place] [address]. Pt was assessed and education was provided. Mr. Alexandra Melara vitals were reviewed. Visit Vitals  /69 (BP 1 Location: Left arm, BP Patient Position: At rest;Sitting)   Pulse 98   Temp 97.9 °F (36.6 °C)   Resp 18   SpO2 98%       Right upper arm PICC flushed easily and had brisk blood return via single ports. PICC dressing c/d/i and not due to be changed. No swelling, redness, streaking, warmth or drainage noted in arm. Pt denied c/o pain in arm.      []  Vancomycin     []  Invanz     []  Cubicin     [x]  Rocephin 2g    was infused over 3 min IVP. Mr. Isaiah Jade tolerated infusion, and had no complaints at this time. single lumens of PICC flushed with NS 10 ml and Heparin 500 units. Green end caps applied. Stockinette placed over dressing for protection. Patient armband removed and shredded. Mr. Isaiah Jade was discharged from [place] [address] in stable condition at 0800. He is to return on [address] [facility] 2019 for his next antibiotic appointment.     Eran Woodard, RN, RN  2019  7196

## 2019-07-01 ENCOUNTER — HOSPITAL ENCOUNTER (OUTPATIENT)
Dept: INFUSION THERAPY | Age: 44
Discharge: HOME | End: 2019-07-01
Payer: MEDICARE

## 2019-07-01 VITALS
SYSTOLIC BLOOD PRESSURE: 108 MMHG | DIASTOLIC BLOOD PRESSURE: 68 MMHG | RESPIRATION RATE: 17 BRPM | OXYGEN SATURATION: 99 % | TEMPERATURE: 98.6 F | HEART RATE: 96 BPM

## 2019-07-01 PROCEDURE — 74011000258 HC RX REV CODE- 258: Performed by: NURSE PRACTITIONER

## 2019-07-01 PROCEDURE — 96365 THER/PROPH/DIAG IV INF INIT: CPT

## 2019-07-01 PROCEDURE — 74011250636 HC RX REV CODE- 250/636: Performed by: INTERNAL MEDICINE

## 2019-07-01 PROCEDURE — 74011250636 HC RX REV CODE- 250/636: Performed by: NURSE PRACTITIONER

## 2019-07-01 PROCEDURE — 77030020847 HC STATLOK BARD -A

## 2019-07-01 RX ORDER — HEPARIN 100 UNIT/ML
500 SYRINGE INTRAVENOUS ONCE
Status: COMPLETED | OUTPATIENT
Start: 2019-07-01 | End: 2019-07-01

## 2019-07-01 RX ADMIN — CEFTRIAXONE 2 G: 2 INJECTION, POWDER, FOR SOLUTION INTRAMUSCULAR; INTRAVENOUS at 15:46

## 2019-07-01 RX ADMIN — Medication 500 UNITS: at 16:13

## 2019-07-01 NOTE — PROGRESS NOTES
[facility] [place] Progress Note    Date: 2019    Name: Aj Barnes    MRN: [id number]         : 1975    Mr. Abigail Ferreira was assessed and education was provided. Mr. Lincoln Pappas vitals were reviewed. Visit Vitals  /68 (BP 1 Location: Left arm, BP Patient Position: Sitting)   Pulse 96   Temp 98.6 °F (37 °C)   Resp 17   SpO2 99%     Right upper arm single lumen PICC flushed easily and had brisk blood return via both ports. PICC dressing and stat lock changed per protocol. No redness, streaking, warmth, or drainage noted at site. Microclaves changed. Pt given new stockinette to protect dressing.       []  Vancomycin     []  Invanz     []  Cubicin     [x]  Rocephin 2g    was infused at  100 ml/hr. Mr. Abigail Ferreira tolerated infusion, and had no complaints at this time. Lumen of PICC flushed with NS 10 ml and Heparin 500 units. Patient armband removed and shredded. Mr. Abigail Ferreira was discharged from [place] [address] in stable condition at 1615. He is to return on 19 at 1300 for his next Rocephin infusion appointment.     Pati Weaver RN  2019  1615

## 2019-07-02 ENCOUNTER — HOSPITAL ENCOUNTER (OUTPATIENT)
Dept: INFUSION THERAPY | Age: 44
Discharge: HOME | End: 2019-07-02
Payer: MEDICARE

## 2019-07-02 VITALS
HEART RATE: 100 BPM | RESPIRATION RATE: 16 BRPM | OXYGEN SATURATION: 98 % | SYSTOLIC BLOOD PRESSURE: 98 MMHG | DIASTOLIC BLOOD PRESSURE: 74 MMHG

## 2019-07-02 PROCEDURE — 74011250636 HC RX REV CODE- 250/636: Performed by: INTERNAL MEDICINE

## 2019-07-02 PROCEDURE — 96365 THER/PROPH/DIAG IV INF INIT: CPT

## 2019-07-02 PROCEDURE — 74011000258 HC RX REV CODE- 258: Performed by: INTERNAL MEDICINE

## 2019-07-02 RX ORDER — SODIUM CHLORIDE 0.9 % (FLUSH) 0.9 %
10-40 SYRINGE (ML) INJECTION AS NEEDED
Status: DISCONTINUED | OUTPATIENT
Start: 2019-07-02 | End: 2019-07-06 | Stop reason: HOSPADM

## 2019-07-02 RX ORDER — HEPARIN 100 UNIT/ML
500 SYRINGE INTRAVENOUS AS NEEDED
Status: DISCONTINUED | OUTPATIENT
Start: 2019-07-02 | End: 2019-07-03 | Stop reason: HOSPADM

## 2019-07-02 RX ADMIN — CEFTRIAXONE 2 G: 2 INJECTION, POWDER, FOR SOLUTION INTRAMUSCULAR; INTRAVENOUS at 14:44

## 2019-07-02 RX ADMIN — Medication 500 UNITS: at 15:18

## 2019-07-03 ENCOUNTER — HOSPITAL ENCOUNTER (OUTPATIENT)
Dept: INFUSION THERAPY | Age: 44
Discharge: HOME | End: 2019-07-03
Payer: MEDICARE

## 2019-07-03 VITALS
DIASTOLIC BLOOD PRESSURE: 71 MMHG | TEMPERATURE: 97.1 F | HEART RATE: 104 BPM | RESPIRATION RATE: 18 BRPM | SYSTOLIC BLOOD PRESSURE: 104 MMHG | OXYGEN SATURATION: 96 %

## 2019-07-03 PROCEDURE — 74011000258 HC RX REV CODE- 258: Performed by: INTERNAL MEDICINE

## 2019-07-03 PROCEDURE — 74011250636 HC RX REV CODE- 250/636: Performed by: INTERNAL MEDICINE

## 2019-07-03 PROCEDURE — 96365 THER/PROPH/DIAG IV INF INIT: CPT

## 2019-07-03 RX ORDER — SODIUM CHLORIDE 0.9 % (FLUSH) 0.9 %
10-40 SYRINGE (ML) INJECTION AS NEEDED
Status: DISCONTINUED | OUTPATIENT
Start: 2019-07-03 | End: 2019-07-07 | Stop reason: HOSPADM

## 2019-07-03 RX ORDER — HEPARIN 100 UNIT/ML
500 SYRINGE INTRAVENOUS AS NEEDED
Status: DISCONTINUED | OUTPATIENT
Start: 2019-07-03 | End: 2019-07-07 | Stop reason: HOSPADM

## 2019-07-03 RX ORDER — HEPARIN 100 UNIT/ML
SYRINGE INTRAVENOUS
Status: DISCONTINUED
Start: 2019-07-03 | End: 2019-07-04 | Stop reason: HOSPADM

## 2019-07-03 RX ADMIN — CEFTRIAXONE 2 G: 2 INJECTION, POWDER, FOR SOLUTION INTRAMUSCULAR; INTRAVENOUS at 14:51

## 2019-07-03 NOTE — PROGRESS NOTES
[facility] Progress Note    Date: July 3, 2019    Name: Oriana Teresa    MRN: [id number]         : 1975    Mr. Isaiah Jade was assessed and education was provided. Mr. Alexandra Melara vitals were reviewed. Visit Vitals  /71 (BP 1 Location: Right arm, BP Patient Position: Sitting)   Pulse (!) 104   Temp 97.1 °F (36.2 °C)   Resp 18   SpO2 96%     Right upper arm single lumen PICC flushed easily and had brisk blood return via both ports. PICC dressing and stat lock changed per protocol. No redness, streaking, warmth, or drainage noted at site. Microclaves changed. Pt given new stockinette to protect dressing.       []  Vancomycin     []  Invanz     []  Cubicin     [x]  Rocephin 2g    was infused at  100 ml/hr. Mr. Isaiah Jade tolerated infusion, and had no complaints at this time. Lumen of PICC flushed with NS 10 ml and Heparin 500 units. Patient armband removed and shredded. Mr. Isaiah Jade was discharged from [place] [address] in stable condition at 1530. He is to return on 19 at 1000 for his next Rocephin infusion appointment.     Shawanda Sierra RN  July 3, 2019  0290

## 2019-07-04 ENCOUNTER — HOSPITAL ENCOUNTER (OUTPATIENT)
Dept: INFUSION THERAPY | Age: 44
Discharge: HOME | End: 2019-07-04
Payer: MEDICARE

## 2019-07-04 VITALS
SYSTOLIC BLOOD PRESSURE: 128 MMHG | OXYGEN SATURATION: 98 % | RESPIRATION RATE: 18 BRPM | HEART RATE: 98 BPM | TEMPERATURE: 98.7 F | DIASTOLIC BLOOD PRESSURE: 83 MMHG

## 2019-07-04 PROCEDURE — 74011000250 HC RX REV CODE- 250: Performed by: NURSE PRACTITIONER

## 2019-07-04 PROCEDURE — 74011250636 HC RX REV CODE- 250/636

## 2019-07-04 PROCEDURE — 74011250636 HC RX REV CODE- 250/636: Performed by: NURSE PRACTITIONER

## 2019-07-04 PROCEDURE — 96374 THER/PROPH/DIAG INJ IV PUSH: CPT

## 2019-07-04 RX ORDER — HEPARIN 100 UNIT/ML
SYRINGE INTRAVENOUS
Status: COMPLETED
Start: 2019-07-04 | End: 2019-07-04

## 2019-07-04 RX ORDER — SODIUM CHLORIDE 0.9 % (FLUSH) 0.9 %
10-40 SYRINGE (ML) INJECTION AS NEEDED
Status: DISCONTINUED | OUTPATIENT
Start: 2019-07-04 | End: 2019-07-08 | Stop reason: HOSPADM

## 2019-07-04 RX ORDER — HEPARIN 100 UNIT/ML
500 SYRINGE INTRAVENOUS AS NEEDED
Status: DISCONTINUED | OUTPATIENT
Start: 2019-07-04 | End: 2019-07-08 | Stop reason: HOSPADM

## 2019-07-04 RX ADMIN — CEFTRIAXONE SODIUM 2 G: 2 INJECTION, POWDER, FOR SOLUTION INTRAMUSCULAR; INTRAVENOUS at 10:14

## 2019-07-04 RX ADMIN — HEPARIN 500 UNITS: 100 SYRINGE at 10:13

## 2019-07-04 RX ADMIN — Medication 20 ML: at 10:13

## 2019-07-04 NOTE — PROGRESS NOTES
[facility] [place] Progress Note    Date: 2019    Name: Spencer Parks    MRN: [id number]         : 1975    Mr. Balwinder Wagner was assessed and education was provided. Mr. Eamon Scott vitals were reviewed. Visit Vitals  /83 (BP 1 Location: Left arm, BP Patient Position: At rest;Sitting)   Pulse 98   Temp 98.7 °F (37.1 °C)   Resp 18   SpO2 98%     Right upper arm single lumen PICC flushed easily and had brisk blood return via both ports. PICC dressing no due to be changed. No redness, streaking, warmth, or drainage noted at site.       []  Vancomycin     []  Invanz     []  Cubicin     [x]  Rocephin 2g    was pushed over 3 mins. Mr. Balwinder Wagner tolerated infusion, and had no complaints at this time. Lumen of PICC flushed with NS 10 ml and Heparin 500 units. Patient armband removed and shredded. Mr. Balwinder Wagner was discharged from [place] [address] in stable condition at 1020. He is to return on 19 for his next Rocephin infusion appointment.     Coy Schwartz RN  2019  1207

## 2019-07-05 ENCOUNTER — HOSPITAL ENCOUNTER (OUTPATIENT)
Dept: INFUSION THERAPY | Age: 44
Discharge: HOME | End: 2019-07-05
Payer: MEDICARE

## 2019-07-05 VITALS
SYSTOLIC BLOOD PRESSURE: 115 MMHG | HEART RATE: 101 BPM | RESPIRATION RATE: 18 BRPM | TEMPERATURE: 97.4 F | OXYGEN SATURATION: 99 % | DIASTOLIC BLOOD PRESSURE: 74 MMHG

## 2019-07-05 PROCEDURE — 74011000258 HC RX REV CODE- 258: Performed by: INTERNAL MEDICINE

## 2019-07-05 PROCEDURE — 77030020847 HC STATLOK BARD -A

## 2019-07-05 PROCEDURE — 96365 THER/PROPH/DIAG IV INF INIT: CPT

## 2019-07-05 PROCEDURE — 74011250636 HC RX REV CODE- 250/636: Performed by: INTERNAL MEDICINE

## 2019-07-05 RX ORDER — HEPARIN 100 UNIT/ML
SYRINGE INTRAVENOUS
Status: DISPENSED
Start: 2019-07-05 | End: 2019-07-05

## 2019-07-05 RX ADMIN — CEFTRIAXONE 2 G: 2 INJECTION, POWDER, FOR SOLUTION INTRAMUSCULAR; INTRAVENOUS at 09:54

## 2019-07-06 ENCOUNTER — HOSPITAL ENCOUNTER (OUTPATIENT)
Dept: INFUSION THERAPY | Age: 44
Discharge: HOME | End: 2019-07-06
Payer: MEDICARE

## 2019-07-06 VITALS
RESPIRATION RATE: 17 BRPM | TEMPERATURE: 98.3 F | HEART RATE: 92 BPM | SYSTOLIC BLOOD PRESSURE: 94 MMHG | OXYGEN SATURATION: 95 % | DIASTOLIC BLOOD PRESSURE: 70 MMHG

## 2019-07-06 PROCEDURE — 74011250636 HC RX REV CODE- 250/636: Performed by: INTERNAL MEDICINE

## 2019-07-06 PROCEDURE — 96374 THER/PROPH/DIAG INJ IV PUSH: CPT

## 2019-07-06 PROCEDURE — 74011000250 HC RX REV CODE- 250: Performed by: INTERNAL MEDICINE

## 2019-07-06 RX ORDER — HEPARIN 100 UNIT/ML
500 SYRINGE INTRAVENOUS ONCE
Status: COMPLETED | OUTPATIENT
Start: 2019-07-06 | End: 2019-07-06

## 2019-07-06 RX ADMIN — HEPARIN 500 UNITS: 100 SYRINGE at 08:59

## 2019-07-06 RX ADMIN — CEFTRIAXONE SODIUM 2 G: 2 INJECTION, POWDER, FOR SOLUTION INTRAMUSCULAR; INTRAVENOUS at 08:55

## 2019-07-06 NOTE — PROGRESS NOTES
[facility] Progress Note    Date: 2019    Name: Tiffany Ahmadi    MRN: [id number]         : 1975    Mr. Nubia Chris was assessed and education was provided. Mr. Petty Knight vitals were reviewed. Visit Vitals  BP 94/70 (BP 1 Location: Left arm, BP Patient Position: Sitting)   Pulse 92   Temp 98.3 °F (36.8 °C)   Resp 17   SpO2 95%     Right upper arm single lumen PICC flushed easily and had brisk blood return via both ports. PICC dressing and stat lock changed per protocol. No redness, streaking, warmth, or drainage noted at site. Microclaves changed. Pt given new stockinette to protect dressing.       []  Vancomycin     []  Invanz     []  Cubicin     [x]  Rocephin 2g    was infused at  100 ml/hr. Mr. Nubia Chris tolerated infusion, and had no complaints at this time. Lumen of PICC flushed with NS 10 ml and Heparin 500 units. PICC dressing c/d/i and not due to be changed. No swelling, redness, streaking, warmth or drainage noted in arm. Pt denied c/o pain in arm. Patient armband removed and shredded. Mr. Nubia Chris was discharged from [place] [address] in stable condition at [address] He is to return on 19 at 0900 for his next Rocephin infusion appointment.     Kaitlin Warren RN  2019  0902

## 2019-07-07 ENCOUNTER — HOSPITAL ENCOUNTER (OUTPATIENT)
Dept: INFUSION THERAPY | Age: 44
Discharge: HOME | End: 2019-07-07
Payer: MEDICARE

## 2019-07-07 VITALS
TEMPERATURE: 97.4 F | RESPIRATION RATE: 17 BRPM | HEART RATE: 94 BPM | OXYGEN SATURATION: 98 % | SYSTOLIC BLOOD PRESSURE: 100 MMHG | DIASTOLIC BLOOD PRESSURE: 66 MMHG

## 2019-07-07 PROCEDURE — 74011250636 HC RX REV CODE- 250/636

## 2019-07-07 PROCEDURE — 96374 THER/PROPH/DIAG INJ IV PUSH: CPT

## 2019-07-07 PROCEDURE — 74011250636 HC RX REV CODE- 250/636: Performed by: INTERNAL MEDICINE

## 2019-07-07 PROCEDURE — 74011000250 HC RX REV CODE- 250: Performed by: INTERNAL MEDICINE

## 2019-07-07 RX ORDER — HEPARIN 100 UNIT/ML
SYRINGE INTRAVENOUS
Status: COMPLETED
Start: 2019-07-07 | End: 2019-07-07

## 2019-07-07 RX ORDER — HEPARIN 100 UNIT/ML
500 SYRINGE INTRAVENOUS ONCE
Status: COMPLETED | OUTPATIENT
Start: 2019-07-07 | End: 2019-07-07

## 2019-07-07 RX ADMIN — HEPARIN 500 UNITS: 100 SYRINGE at 09:04

## 2019-07-07 RX ADMIN — CEFTRIAXONE SODIUM 2 G: 2 INJECTION, POWDER, FOR SOLUTION INTRAMUSCULAR; INTRAVENOUS at 08:56

## 2019-07-07 NOTE — PROGRESS NOTES
hospitals Progress Note    Date: 2019    Name: Katherine Mccloud    MRN: [id number]         : 1975    Mr. Marvin Ariza was assessed and education was provided. Mr. Jordan Deal vitals were reviewed. Visit Vitals  /66 (BP 1 Location: Left arm, BP Patient Position: Sitting)   Pulse 94   Temp 97.4 °F (36.3 °C)   Resp 17   SpO2 98%     Right upper arm single lumen PICC flushed easily and had brisk blood return via both ports. PICC dressing and stat lock changed per protocol. No redness, streaking, warmth, or drainage noted at site. Microclaves changed. Pt given new stockinette to protect dressing.       []  Vancomycin     []  Invanz     []  Cubicin     [x]  Rocephin 2g    was infused at  100 ml/hr. Mr. Marvin Ariza tolerated infusion, and had no complaints at this time. Lumen of PICC flushed with NS 10 ml and Heparin 500 units. PICC dressing c/d/i and not due to be changed. No swelling, redness, streaking, warmth or drainage noted in arm. Pt denied c/o pain in arm. Patient armband removed and shredded. Mr. Marvin Ariza was discharged from [place] [address] in stable condition at [phone] 26. He is to return on 19 at 1100 for his next Rocephin infusion appointment.     Hilton Ramirez RN  2019  1661

## 2019-07-08 ENCOUNTER — HOSPITAL ENCOUNTER (OUTPATIENT)
Dept: INFUSION THERAPY | Age: 44
Discharge: HOME | End: 2019-07-08
Payer: MEDICARE

## 2019-07-08 VITALS
OXYGEN SATURATION: 100 % | DIASTOLIC BLOOD PRESSURE: 67 MMHG | RESPIRATION RATE: 18 BRPM | SYSTOLIC BLOOD PRESSURE: 107 MMHG | TEMPERATURE: 98.7 F | HEART RATE: 104 BPM

## 2019-07-08 PROCEDURE — 74011000258 HC RX REV CODE- 258: Performed by: INTERNAL MEDICINE

## 2019-07-08 PROCEDURE — 96365 THER/PROPH/DIAG IV INF INIT: CPT

## 2019-07-08 PROCEDURE — 74011250636 HC RX REV CODE- 250/636: Performed by: INTERNAL MEDICINE

## 2019-07-08 RX ORDER — SODIUM CHLORIDE 0.9 % (FLUSH) 0.9 %
10-40 SYRINGE (ML) INJECTION AS NEEDED
Status: DISCONTINUED | OUTPATIENT
Start: 2019-07-08 | End: 2019-07-12 | Stop reason: HOSPADM

## 2019-07-08 RX ORDER — HEPARIN 100 UNIT/ML
500 SYRINGE INTRAVENOUS ONCE
Status: COMPLETED | OUTPATIENT
Start: 2019-07-08 | End: 2019-07-08

## 2019-07-08 RX ADMIN — Medication 10 ML: at 11:05

## 2019-07-08 RX ADMIN — Medication 10 ML: at 12:05

## 2019-07-08 RX ADMIN — Medication 500 UNITS: at 12:05

## 2019-07-08 RX ADMIN — CEFTRIAXONE 2 G: 2 INJECTION, POWDER, FOR SOLUTION INTRAMUSCULAR; INTRAVENOUS at 11:34

## 2019-07-08 NOTE — PROGRESS NOTES
[facility] Progress Note    Date: 2019    Name: Eileen Funk    MRN: [id number]         : 1975    Antibiotic Infusion    Mr. Handy to [facility], ambulatory using scooter at 1100 accompanied by self. Pt was assessed and education was provided. Mr. Enrike Schuster vitals were reviewed. Visit Vitals  /67 (BP 1 Location: Left arm, BP Patient Position: Sitting)   Pulse (!) 104   Temp 98.7 °F (37.1 °C)   Resp 18   SpO2 100%       right upper arm PICC flushed easily and had brisk blood return via single ports. PICC dressing c/d/i and not due to be changed. No swelling, redness, streaking, warmth or drainage noted in arm. Pt denied c/o pain in arm.      []  Vancomycin     []  Invanz     []  Cubicin     [x]  Rocephin 2g    was infused at 100 ml/hr (over approximately 30 minutes). No redness, streaking, warmth, or drainage noted at site. Mr. Gregg Sunshine tolerated infusion, and had no complaints at this time. single lumens of PICC flushed with NS 10 ml and Heparin 250 units. Green end caps applied, and lumens wrapped with guaze and paper tape. Stockinette placed over dressing for protection. Patient armband removed and shredded. Mr. Gregg Sunshine was discharged from [place] [address] in stable condition at 1210. He is to return on 2019 at 1400 for his next antibiotic appointment.     Patel Siddiqui RN  2019

## 2019-07-09 ENCOUNTER — HOSPITAL ENCOUNTER (OUTPATIENT)
Dept: WOUND CARE | Age: 44
Discharge: HOME | End: 2019-07-09

## 2019-07-09 ENCOUNTER — HOSPITAL ENCOUNTER (OUTPATIENT)
Dept: INFUSION THERAPY | Age: 44
Discharge: HOME | End: 2019-07-09
Payer: MEDICARE

## 2019-07-09 VITALS
SYSTOLIC BLOOD PRESSURE: 115 MMHG | DIASTOLIC BLOOD PRESSURE: 72 MMHG | HEART RATE: 97 BPM | RESPIRATION RATE: 17 BRPM | OXYGEN SATURATION: 100 % | TEMPERATURE: 97.9 F

## 2019-07-09 VITALS
TEMPERATURE: 97.9 F | OXYGEN SATURATION: 98 % | HEART RATE: 98 BPM | DIASTOLIC BLOOD PRESSURE: 60 MMHG | SYSTOLIC BLOOD PRESSURE: 90 MMHG

## 2019-07-09 DIAGNOSIS — M86.071 ACUTE HEMATOGENOUS OSTEOMYELITIS OF RIGHT FOOT (HCC): Primary | ICD-10-CM

## 2019-07-09 PROCEDURE — 74011000258 HC RX REV CODE- 258: Performed by: INTERNAL MEDICINE

## 2019-07-09 PROCEDURE — 74011250636 HC RX REV CODE- 250/636: Performed by: INTERNAL MEDICINE

## 2019-07-09 PROCEDURE — 96365 THER/PROPH/DIAG IV INF INIT: CPT

## 2019-07-09 RX ORDER — HEPARIN 100 UNIT/ML
500 SYRINGE INTRAVENOUS ONCE
Status: COMPLETED | OUTPATIENT
Start: 2019-07-09 | End: 2019-07-09

## 2019-07-09 RX ADMIN — CEFTRIAXONE 2 G: 2 INJECTION, POWDER, FOR SOLUTION INTRAMUSCULAR; INTRAVENOUS at 14:13

## 2019-07-09 RX ADMIN — SODIUM CHLORIDE, PRESERVATIVE FREE 500 UNITS: 5 INJECTION INTRAVENOUS at 14:46

## 2019-07-09 NOTE — PROGRESS NOTES
Infectious Disease Follow-up Note      Date of Admission: 7/9/2019     Date of Note:  7/9/2019      Summary:      38 y/o AAm w/DM, HTN chronic R lateral plantar ulcer admitted to [facility] for dorsolateral R foot ulcer and OM 5th MT, transferred to [facility] 6/6. BSI group C Streptococci 1 of 2 on 5/29. S/p rsxn R 5th MTH, base proximal phalanx, 2nd pass bx 5th MT, and flap closure on 6/2 bone cx no growth. 2nd pass prox phalanx: chronic osteomyelitis. S/p 6 days vancomycin,  zosyn, then Ceftriaxone 6/4 and transferred to [facility] 6/6. dc'd 6/28 then daily infusion as OP.      Interval History:      Has been going     Current Antimicrobials: Prior Antimicrobials   Ceftriaxone 6/4 - 34  Post amp 6/2 - 36  Vancom, zosyn 5/29 - 5       Assessment / Plan:      Osteomyelitis, right 5th metatarsal  - acute.  Secondary to new dorsolateral ulcer  - xray 5/29: lytic changes distal 5th metatarsal  - MRI 5/31: OM distal 5th MT, base 5th prox phalanx, septic arthritis 5th MTP  - s/p Resxn R 5th MTH, base prox phalanx. 2nd pass bx 5th MT, flap closure 6/2  cx NGTD  - 2nd pass prox phalanx: chronic osteomyelitis  - Podiatry following -> continue Ceftriaxone 2 gm IV q 24 x 6 weeks (end date: 7/14/2019).    -> CBC, BMP, ESR, CRP tomorrow at OP infusion center  -> continue non-weight bearing per Podiatry  -> will see 7/16 and remove PICC if not yet done.      Positive blood culture GC Strep  - 1 of 2 blcx 5/29 Group C Streptococcus  - unclear significance (contaminant vs from OM wound infection)   - covered by current abx     Chronic non-healing ulcer, lateral plantar, R foot     DM     HTN        Microbiology:   5/29      blcx IP x2     Lines / Catheters:   R PICC          Patient Active Problem List   Diagnosis Code    Right foot ulcer, with necrosis of muscle ([address] L97.513    Diabetic polyneuropathy ([address] E11.42    Right foot ulcer, with fat layer exposed ([address] L97.512    Osteomyelitis of right foot ([address] M86.9    Sepsis ([address] A41.9    Left arm weakness R29.898           Current Outpatient Medications   Medication Sig Dispense    Lactobacillus Acidoph & Bulgar (FLORANEX) 1 million cell tab tablet Take 2 Tabs by mouth two (2) times a day. 120 Tab    glucose 4 gram chewable tablet Take 4 Tabs by mouth as needed (hypoglycemia). 30 Tab    docusate sodium (COLACE) 100 mg capsule Take 1 Cap by mouth daily for 90 days. 30 Cap    insulin lispro (HUMALOG KWIKPEN INSULIN) 100 unit/mL kwikpen 6 Units by SubCUTAneous route Before breakfast, lunch, and dinner. 10 Pen    Insulin Needles, Disposable, 31 gauge x 5/16\" ndle by SubCUTAneous route three (3) times daily. 90 Package    gabapentin (NEURONTIN) 300 mg capsule Take 1 Cap by mouth three (3) times daily. Indications: Neuropathic Pain 90 Cap    insulin lispro (HUMALOG KWIKPEN INSULIN) 100 unit/mL kwikpen Very Insulin Resistant  For Blood Sugar (mg/dL) of:              Less than 150 =   0 units  150 -199 =   3 units  200 -249 =   6 units  250 -299 =   9 units  300 -349 =   12 units  350 and above =   15 units  Initiate Hypoglycemic protocol if blood glucose is <70 mg/dL. 1 Package    insulin glargine (BASAGLAR KWIKPEN U-100 INSULIN) 100 unit/mL (3 mL) inpn 15 Units by SubCUTAneous route two (2) times a day. 10 Adjustable Dose Pre-filled Pen Syringe    calcium-cholecalciferol, d3, (CALCIUM 600 + D) 600-125 mg-unit tab Take  by mouth.  simvastatin (ZOCOR) 20 mg tablet Take 40 mg by mouth nightly.  aspirin 81 mg chewable tablet Take 81 mg by mouth daily. Indications: MYOCARDIAL INFARCTION PREVENTION      No current facility-administered medications for this encounter.       Facility-Administered Medications Ordered in Other Encounters   Medication Dose Route Frequency    sodium chloride (NS) flush 10-40 mL  10-40 mL IntraVENous PRN    [START ON 7/13/2019] cefTRIAXone (ROCEPHIN) 2 g in sterile water (preservative free) 20 mL IV syringe  2 g IntraVENous ONCE    [START ON 7/14/2019] cefTRIAXone (ROCEPHIN) 2 g in sterile water (preservative free) 20 mL IV syringe  2 g IntraVENous ONCE         Review of Systems - Negative except as in interval history       Objective:     Visit Vitals  BP 90/60   Pulse 98   Temp 97.9 °F (36.6 °C)   SpO2 98%       Temp (24hrs), Av.9 °F (36.6 °C), Min:97.9 °F (36.6 °C), Max:97.9 °F (36.6 °C)      GEN: well developed 38year-old AA male in no distress  HEENT: no scleral icterus, no oral lesions  CHEST: no crackles or wheezes  CVS:regular, no murmurs  ABD: soft, non-tender  EXT: right foot lateral wound healing well with some crusting, small shallow raw area where scab was dislodged    Lab results     Chemistry  No results for input(s): GLU, NA, K, CL, CO2, BUN, CREA, CA, AGAP, BUCR, TBIL, GPT, AP, TP, ALB, GLOB, AGRAT in the last 72 hours. CBC w/ Diff  No results for input(s): WBC, RBC, HGB, HCT, PLT, GRANS, LYMPH, EOS, HGBEXT, HCTEXT, PLTEXT in the last 72 hours.     Microbiology  All Micro Results     None             Christopher Che MD, [facility]  Infectious Disease Specialist  Pager [phone]

## 2019-07-09 NOTE — PROGRESS NOTES
[facility] Progress Note    Date: 2019    Name: Laureano Mtz    MRN: [id number]         : 1975    Mr. Vance Montoya was assessed and education was provided. Mr. Benja Gonzalez vitals were reviewed. Visit Vitals  /72 (BP 1 Location: Left arm, BP Patient Position: Sitting)   Pulse 97   Temp 97.9 °F (36.6 °C)   Resp 17   SpO2 100%     Right upper arm single lumen PICC flushed easily and had brisk blood return via both ports. PICC dressing c/d/i and not due to be changed. No swelling, redness, streaking, warmth or drainage noted in arm. Pt denied c/o pain in arm. .       []  Vancomycin     []  Invanz     []  Cubicin     [x]  Rocephin 2g    was infused at  100 ml/hr. Mr. Vance Montoya tolerated infusion, and had no complaints at this time. Lumen of PICC flushed with NS 10 ml and Heparin 500 units. Patient armband removed and shredded. Mr. Vance Montoya was discharged from [place] [address] in stable condition at 1450. He is to return on 7/10/19 at 1100 for his next Rocephin infusion appointment.     Nader Monk RN  2019  1450

## 2019-07-10 ENCOUNTER — HOSPITAL ENCOUNTER (OUTPATIENT)
Dept: INFUSION THERAPY | Age: 44
Discharge: HOME | End: 2019-07-10
Payer: MEDICARE

## 2019-07-10 VITALS
SYSTOLIC BLOOD PRESSURE: 100 MMHG | OXYGEN SATURATION: 100 % | TEMPERATURE: 98.1 F | HEART RATE: 93 BPM | RESPIRATION RATE: 18 BRPM | DIASTOLIC BLOOD PRESSURE: 64 MMHG

## 2019-07-10 PROCEDURE — 74011000258 HC RX REV CODE- 258: Performed by: INTERNAL MEDICINE

## 2019-07-10 PROCEDURE — 74011250636 HC RX REV CODE- 250/636: Performed by: INTERNAL MEDICINE

## 2019-07-10 PROCEDURE — 96365 THER/PROPH/DIAG IV INF INIT: CPT

## 2019-07-10 RX ORDER — HEPARIN 100 UNIT/ML
500 SYRINGE INTRAVENOUS ONCE
Status: COMPLETED | OUTPATIENT
Start: 2019-07-10 | End: 2019-07-10

## 2019-07-10 RX ORDER — SODIUM CHLORIDE 0.9 % (FLUSH) 0.9 %
10-40 SYRINGE (ML) INJECTION AS NEEDED
Status: DISCONTINUED | OUTPATIENT
Start: 2019-07-10 | End: 2019-07-14 | Stop reason: HOSPADM

## 2019-07-10 RX ADMIN — Medication 10 ML: at 11:30

## 2019-07-10 RX ADMIN — SODIUM CHLORIDE, PRESERVATIVE FREE 500 UNITS: 5 INJECTION INTRAVENOUS at 12:03

## 2019-07-10 RX ADMIN — Medication 10 ML: at 12:03

## 2019-07-10 RX ADMIN — CEFTRIAXONE 2 G: 2 INJECTION, POWDER, FOR SOLUTION INTRAMUSCULAR; INTRAVENOUS at 11:35

## 2019-07-10 NOTE — PROGRESS NOTES
[facility] Progress Note    Date: July 10, 2019    Name: Pankaj Louis    MRN: [id number]         : 1975    Antibiotic Infusion    Mr. Handy to [facility], ambulatory using scooter at 1100 accompanied by self. Pt was assessed and education was provided. Mr. Shantell Kimbrough vitals were reviewed. Visit Vitals  /64   Pulse 93   Temp 98.1 °F (36.7 °C)   Resp 18   SpO2 100%       right upper arm PICC flushed easily and had brisk blood return via single ports. PICC dressing c/d/i and not due to be changed. No swelling, redness, streaking, warmth or drainage noted in arm. Pt denied c/o pain in arm.      []  Vancomycin     []  Invanz     []  Cubicin     [x]  Rocephin 2g    was infused at 100 ml/hr (over approximately 30 minutes). No redness, streaking, warmth, or drainage noted at site. Mr. Cecile Iraheta tolerated infusion, and had no complaints at this time. single lumens of PICC flushed with NS 10 ml and Heparin 250 units. Green end caps applied, and lumens wrapped with guaze and paper tape. Stockinette placed over dressing for protection. Patient armband removed and shredded. Mr. Cecile Iraheta was discharged from [place] [address] in stable condition at 1205. He is to return on 2019 at 1500 for his next antibiotic appointment.     Daniel Castaneda RN  July 10, 2019

## 2019-07-11 ENCOUNTER — HOSPITAL ENCOUNTER (OUTPATIENT)
Dept: INFUSION THERAPY | Age: 44
Discharge: HOME | End: 2019-07-11
Payer: MEDICARE

## 2019-07-11 VITALS
OXYGEN SATURATION: 97 % | HEART RATE: 105 BPM | TEMPERATURE: 98.3 F | DIASTOLIC BLOOD PRESSURE: 57 MMHG | SYSTOLIC BLOOD PRESSURE: 85 MMHG | RESPIRATION RATE: 18 BRPM

## 2019-07-11 PROCEDURE — 74011000258 HC RX REV CODE- 258: Performed by: INTERNAL MEDICINE

## 2019-07-11 PROCEDURE — 74011250636 HC RX REV CODE- 250/636: Performed by: INTERNAL MEDICINE

## 2019-07-11 PROCEDURE — 96365 THER/PROPH/DIAG IV INF INIT: CPT

## 2019-07-11 RX ORDER — HEPARIN 100 UNIT/ML
500 SYRINGE INTRAVENOUS ONCE
Status: COMPLETED | OUTPATIENT
Start: 2019-07-11 | End: 2019-07-11

## 2019-07-11 RX ORDER — SODIUM CHLORIDE 0.9 % (FLUSH) 0.9 %
10-40 SYRINGE (ML) INJECTION AS NEEDED
Status: DISCONTINUED | OUTPATIENT
Start: 2019-07-11 | End: 2019-07-15 | Stop reason: HOSPADM

## 2019-07-11 RX ADMIN — CEFTRIAXONE 2 G: 2 INJECTION, POWDER, FOR SOLUTION INTRAMUSCULAR; INTRAVENOUS at 14:50

## 2019-07-11 RX ADMIN — SODIUM CHLORIDE, PRESERVATIVE FREE 500 UNITS: 5 INJECTION INTRAVENOUS at 15:20

## 2019-07-11 RX ADMIN — Medication 10 ML: at 15:19

## 2019-07-11 NOTE — PROGRESS NOTES
[facility] Progress Note    Date: 2019    Name: Claudene Spitz    MRN: [id number]         : 1975    Antibiotic Infusion    Mr. Handy to [facility], ambulatory using scooter at 1430 accompanied by self. Pt was assessed and education was provided. Mr. Victor M Rand vitals were reviewed. Visit Vitals  BP (!) 85/57 (BP 1 Location: Left arm, BP Patient Position: Sitting)   Pulse (!) 105   Temp 98.3 °F (36.8 °C)   Resp 18   SpO2 97%       right upper arm PICC flushed easily and had brisk blood return via single ports. PICC dressing c/d/i and not due to be changed. No swelling, redness, streaking, warmth or drainage noted in arm. Pt denied c/o pain in arm.      []  Vancomycin     []  Invanz     []  Cubicin     [x]  Rocephin 2g    was infused at 100 ml/hr (over approximately 30 minutes). No redness, streaking, warmth, or drainage noted at site. Mr. Taylor Abad tolerated infusion, and had no complaints at this time. single lumens of PICC flushed with NS 10 ml and Heparin 250 units. Green end caps applied, and lumens wrapped with guaze and paper tape. Stockinette placed over dressing for protection. Patient armband removed and shredded. Mr. Taylor Abad was discharged from [place] [address] in stable condition at 1525. He is to return on 2019 at 1300 for his next antibiotic appointment.     Marguerite Gaytan RN  2019

## 2019-07-12 ENCOUNTER — HOSPITAL ENCOUNTER (OUTPATIENT)
Dept: INFUSION THERAPY | Age: 44
Discharge: HOME | End: 2019-07-12
Payer: MEDICARE

## 2019-07-12 VITALS
SYSTOLIC BLOOD PRESSURE: 85 MMHG | HEART RATE: 99 BPM | RESPIRATION RATE: 18 BRPM | OXYGEN SATURATION: 98 % | TEMPERATURE: 97.5 F | DIASTOLIC BLOOD PRESSURE: 54 MMHG

## 2019-07-12 PROCEDURE — 74011000258 HC RX REV CODE- 258: Performed by: INTERNAL MEDICINE

## 2019-07-12 PROCEDURE — 77030020847 HC STATLOK BARD -A

## 2019-07-12 PROCEDURE — 74011250636 HC RX REV CODE- 250/636

## 2019-07-12 PROCEDURE — 74011250636 HC RX REV CODE- 250/636: Performed by: INTERNAL MEDICINE

## 2019-07-12 PROCEDURE — 96365 THER/PROPH/DIAG IV INF INIT: CPT

## 2019-07-12 RX ORDER — SODIUM CHLORIDE 0.9 % (FLUSH) 0.9 %
10-40 SYRINGE (ML) INJECTION AS NEEDED
Status: DISCONTINUED | OUTPATIENT
Start: 2019-07-12 | End: 2019-07-16 | Stop reason: HOSPADM

## 2019-07-12 RX ORDER — HEPARIN 100 UNIT/ML
500 SYRINGE INTRAVENOUS AS NEEDED
Status: DISCONTINUED | OUTPATIENT
Start: 2019-07-12 | End: 2019-07-16 | Stop reason: HOSPADM

## 2019-07-12 RX ORDER — HEPARIN 100 UNIT/ML
SYRINGE INTRAVENOUS
Status: COMPLETED
Start: 2019-07-12 | End: 2019-07-12

## 2019-07-12 RX ADMIN — HEPARIN 500 UNITS: 100 SYRINGE at 14:21

## 2019-07-12 RX ADMIN — Medication 20 ML: at 13:45

## 2019-07-12 RX ADMIN — CEFTRIAXONE 2 G: 2 INJECTION, POWDER, FOR SOLUTION INTRAMUSCULAR; INTRAVENOUS at 13:45

## 2019-07-12 RX ADMIN — Medication 500 UNITS: at 14:21

## 2019-07-12 NOTE — PROGRESS NOTES
[facility] Progress Note    Date: 2019    Name: Soo Pickering    MRN: [id number]         : 1975    Mr. Thuy Lopez was assessed and education was provided. Mr. Rosa Baumann vitals were reviewed. Patient Vitals for the past 12 hrs:   Temp Pulse Resp BP SpO2   19 1348    (!) 85/54    19 1320 97.5 °F (36.4 °C) 99 18 (!) 75/53 98 %       Right upper arm single lumen PICC flushed easily and had brisk blood return via both ports. PICC dressing and stat lock changed per protocol. No redness, streaking, warmth, or drainage noted at site. Microclaves changed. Pt given new stockinette to protect dressing.       []  Vancomycin     []  Invanz     []  Cubicin     [x]  Rocephin 2g    was infused at  100 ml/hr. Mr. Thuy Lopez tolerated infusion, and had no complaints at this time. Lumen of PICC flushed with NS 10 ml and Heparin 500 units. Picc dressing was loose. Picc care performed under sterile technique, site cleansed using chloraprep, let dry, applied skin prep, applied statlock, applied biopatch, covered with tegaderm dressing. Patient armband removed and shredded. Mr. Thuy Lopez was discharged from [place] [address] in stable condition at 1435. He is to return on 19 at 0930 for his next Rocephin infusion appointment.     Wander Brandt RN  2019  5010

## 2019-07-13 ENCOUNTER — HOSPITAL ENCOUNTER (OUTPATIENT)
Dept: INFUSION THERAPY | Age: 44
Discharge: HOME | End: 2019-07-13
Payer: MEDICARE

## 2019-07-13 VITALS
SYSTOLIC BLOOD PRESSURE: 104 MMHG | OXYGEN SATURATION: 99 % | HEART RATE: 86 BPM | TEMPERATURE: 98.4 F | DIASTOLIC BLOOD PRESSURE: 71 MMHG | RESPIRATION RATE: 16 BRPM

## 2019-07-13 PROCEDURE — 74011000250 HC RX REV CODE- 250: Performed by: INTERNAL MEDICINE

## 2019-07-13 PROCEDURE — 74011250636 HC RX REV CODE- 250/636: Performed by: INTERNAL MEDICINE

## 2019-07-13 PROCEDURE — 96374 THER/PROPH/DIAG INJ IV PUSH: CPT

## 2019-07-13 RX ORDER — SODIUM CHLORIDE 0.9 % (FLUSH) 0.9 %
10-40 SYRINGE (ML) INJECTION AS NEEDED
Status: DISCONTINUED | OUTPATIENT
Start: 2019-07-13 | End: 2019-07-17 | Stop reason: HOSPADM

## 2019-07-13 RX ORDER — HEPARIN 100 UNIT/ML
SYRINGE INTRAVENOUS
Status: DISPENSED
Start: 2019-07-13 | End: 2019-07-13

## 2019-07-13 RX ORDER — HEPARIN 100 UNIT/ML
500 SYRINGE INTRAVENOUS AS NEEDED
Status: DISCONTINUED | OUTPATIENT
Start: 2019-07-13 | End: 2019-07-17 | Stop reason: HOSPADM

## 2019-07-13 RX ADMIN — HEPARIN 500 UNITS: 100 SYRINGE at 08:25

## 2019-07-13 RX ADMIN — Medication 20 ML: at 08:25

## 2019-07-13 RX ADMIN — CEFTRIAXONE SODIUM 2 G: 2 INJECTION, POWDER, FOR SOLUTION INTRAMUSCULAR; INTRAVENOUS at 08:18

## 2019-07-13 RX ADMIN — Medication 10 ML: at 08:15

## 2019-07-13 NOTE — PROGRESS NOTES
[facility] Progress Note    Date: 2019    Name: Aj Barnes    MRN: [id number]         : 1975      Mr. Abigail Ferreira arrived in the [facility] today at 0800, in stable condition, here for Daily IV Rocephin antibiotic therapy. He was assessed and education was provided. Mr. Lincoln Pappas vitals were reviewed. Visit Vitals  /71 (BP 1 Location: Left arm, BP Patient Position: Sitting)   Pulse 86   Temp 98.4 °F (36.9 °C)   Resp 16   SpO2 99%            His right upper arm single lumen PICC line was noted to be dry and intact. Rocephin (Ceftriaxone) 2 grams IV, was administered over approximately 3 minutes, per order, and without incident. After completion of the IV Rocephin, the PICC line was flushed well with NS & Heparin, and then left dry and intact        Mr. Handy tolerated well, and had no complaints. Mr. Abigail Ferreira was discharged from [place] [address] in stable condition at [address] [place] [address]. He is to return on tomorrow, , 19,  at 0800, for his next appointment, for daily IV antibiotic therapy.      Narciso Parekh RN  2019  8:15 AM

## 2019-07-14 ENCOUNTER — HOSPITAL ENCOUNTER (OUTPATIENT)
Dept: INFUSION THERAPY | Age: 44
Discharge: HOME | End: 2019-07-14
Payer: MEDICARE

## 2019-07-14 VITALS
DIASTOLIC BLOOD PRESSURE: 86 MMHG | OXYGEN SATURATION: 99 % | HEART RATE: 77 BPM | TEMPERATURE: 98.5 F | SYSTOLIC BLOOD PRESSURE: 127 MMHG | RESPIRATION RATE: 16 BRPM

## 2019-07-14 PROCEDURE — 74011250636 HC RX REV CODE- 250/636: Performed by: INTERNAL MEDICINE

## 2019-07-14 PROCEDURE — 96374 THER/PROPH/DIAG INJ IV PUSH: CPT

## 2019-07-14 PROCEDURE — 74011000250 HC RX REV CODE- 250: Performed by: INTERNAL MEDICINE

## 2019-07-14 RX ORDER — HEPARIN 100 UNIT/ML
500 SYRINGE INTRAVENOUS AS NEEDED
Status: DISCONTINUED | OUTPATIENT
Start: 2019-07-14 | End: 2019-07-18 | Stop reason: HOSPADM

## 2019-07-14 RX ORDER — SODIUM CHLORIDE 0.9 % (FLUSH) 0.9 %
10-40 SYRINGE (ML) INJECTION AS NEEDED
Status: DISCONTINUED | OUTPATIENT
Start: 2019-07-14 | End: 2019-07-18 | Stop reason: HOSPADM

## 2019-07-14 RX ADMIN — HEPARIN 500 UNITS: 100 SYRINGE at 08:20

## 2019-07-14 RX ADMIN — Medication 10 ML: at 08:10

## 2019-07-14 RX ADMIN — Medication 20 ML: at 08:20

## 2019-07-14 RX ADMIN — CEFTRIAXONE SODIUM 2 G: 2 INJECTION, POWDER, FOR SOLUTION INTRAMUSCULAR; INTRAVENOUS at 08:15

## 2019-07-14 NOTE — PROGRESS NOTES
[facility] Progress Note    Date: 2019    Name: Tiffany Ahmadi    MRN: [id number]         : 1975      Mr. Nubia Chris arrived in the [facility] today at 0800, in stable condition, here for Daily IV Rocephin antibiotic therapy. He was assessed and education was provided. Mr. Petty Knight vitals were reviewed. Visit Vitals  /86 (BP 1 Location: Left arm, BP Patient Position: Sitting)   Pulse 77   Temp 98.5 °F (36.9 °C)   Resp 16   SpO2 99%            His right upper arm single lumen PICC line was noted to be dry and intact. Rocephin (Ceftriaxone) 2 grams IV, was administered over approximately 3 minutes, per order, and without incident. After completion of the IV Rocephin, the PICC line was flushed well with NS & Heparin, and then left dry and intact        Mr. Handy tolerated well, and had no complaints. Mr. Nubia Chris was discharged from [place] [address] in stable condition at 1. He has no further OPIC appointments scheduled at this time, because as of today, he has completed all ordered doses of Rocephin antibiotic therapy. However, he is scheduled to follow up with Dr. kP Angela (Infectious Disease), on Tuesday, 19 at 0930, and Mr. Nubia Chris was instructed to ask Dr. Pk Angela about having his PICC line removed, and he verbalized understanding.      Barbara June RN  2019  8:15 AM

## 2019-07-16 ENCOUNTER — HOSPITAL ENCOUNTER (OUTPATIENT)
Dept: WOUND CARE | Age: 44
Discharge: HOME | End: 2019-07-16

## 2019-07-16 VITALS
OXYGEN SATURATION: 100 % | TEMPERATURE: 98.9 F | HEART RATE: 88 BPM | SYSTOLIC BLOOD PRESSURE: 118 MMHG | DIASTOLIC BLOOD PRESSURE: 78 MMHG

## 2019-07-16 NOTE — WOUND CARE
Patient presented to wound clinic for follow up with Dr Fabricio Menjivar for ID. Wound is closed at today's appointment. Right foot washed and lotion applied. Educated patient on importance of proper foot care and protection, he verbalized understanding. No follow up needed at this time.

## 2019-07-16 NOTE — PROGRESS NOTES
Infectious Disease Follow-up Note      Date of Admission: 7/16/2019     Date of Note:  7/16/2019      Summary:      38 y/o AAm w/DM, HTN chronic R lateral plantar ulcer admitted to [facility] for dorsolateral R foot ulcer and OM 5th MT, transferred to [facility] 6/6.  BSI group C Streptococci 1 of 2 on 5/29.  S/p rsxn R 5th MTH, base proximal phalanx, 2nd pass bx 5th MT, and flap closure on 6/2 bone cx no growth. 2nd pass prox phalanx: chronic osteomyelitis.  S/p 6 days vancomycin,  zosyn, then Ceftriaxone 6/4 and transferred to [facility] 6/6. dc'd 6/28 then daily infusion as OP.      Interval History:      No new complaints. Had last dose of Ceftriaxone 7/14.       Current Antimicrobials: Prior Antimicrobials   None 7/14 - 2  Vancom, zosyn 5/29 - 6 Ceftriaxone 6/4 - 40  Post amp 6/2 - 42      Assessment / Plan:      Osteomyelitis, right 5th metatarsal  - acute.  Secondary to new dorsolateral ulcer  - xray 5/29: lytic changes distal 5th metatarsal  - MRI 5/31: OM distal 5th MT, base 5th prox phalanx, septic arthritis 5th MTP  - s/p Resxn R 5th MTH, base prox phalanx.  2nd pass bx 5th MT, flap closure 6/2  cx NGTD  - 2nd pass prox phalanx: chronic osteomyelitis  - Podiatry following -> ended IV Ceftriaxone 7/14  -> remove PICC today - done  -> will discharge from ID care  -> follow up with Podiatry      Positive blood culture GC Strep  - 1 of 2 blcx 5/29 Group C Streptococcus  - unclear significance (contaminant vs from OM wound infection)   - covered by current abx     Chronic non-healing ulcer, lateral plantar, R foot     DM     HTN        Microbiology:   5/29      blcx IP x2     Lines / Catheters:   R PICC        Patient Active Problem List   Diagnosis Code    Right foot ulcer, with necrosis of muscle ([address] L97.513    Diabetic polyneuropathy ([address] E11.42    Right foot ulcer, with fat layer exposed ([address] L97.512    Osteomyelitis of right foot ([address] M86.9    Sepsis ([address] A41.9    Left arm weakness R29.898           Current Outpatient Medications   Medication Sig Dispense    Lactobacillus Acidoph & Bulgar (FLORANEX) 1 million cell tab tablet Take 2 Tabs by mouth two (2) times a day. 120 Tab    glucose 4 gram chewable tablet Take 4 Tabs by mouth as needed (hypoglycemia). 30 Tab    docusate sodium (COLACE) 100 mg capsule Take 1 Cap by mouth daily for 90 days. 30 Cap    insulin lispro (HUMALOG KWIKPEN INSULIN) 100 unit/mL kwikpen 6 Units by SubCUTAneous route Before breakfast, lunch, and dinner. 10 Pen    Insulin Needles, Disposable, 31 gauge x 5/16\" ndle by SubCUTAneous route three (3) times daily. 90 Package    gabapentin (NEURONTIN) 300 mg capsule Take 1 Cap by mouth three (3) times daily. Indications: Neuropathic Pain 90 Cap    insulin lispro (HUMALOG KWIKPEN INSULIN) 100 unit/mL kwikpen Very Insulin Resistant  For Blood Sugar (mg/dL) of:              Less than 150 =   0 units  150 -199 =   3 units  200 -249 =   6 units  250 -299 =   9 units  300 -349 =   12 units  350 and above =   15 units  Initiate Hypoglycemic protocol if blood glucose is <70 mg/dL. 1 Package    insulin glargine (BASAGLAR KWIKPEN U-100 INSULIN) 100 unit/mL (3 mL) inpn 15 Units by SubCUTAneous route two (2) times a day. 10 Adjustable Dose Pre-filled Pen Syringe    calcium-cholecalciferol, d3, (CALCIUM 600 + D) 600-125 mg-unit tab Take  by mouth.  simvastatin (ZOCOR) 20 mg tablet Take 40 mg by mouth nightly.  aspirin 81 mg chewable tablet Take 81 mg by mouth daily. Indications: MYOCARDIAL INFARCTION PREVENTION      No current facility-administered medications for this encounter.       Facility-Administered Medications Ordered in Other Encounters   Medication Dose Route Frequency    sodium chloride (NS) flush 10-40 mL  10-40 mL IntraVENous PRN    heparin (porcine) pf 500 Units  500 Units InterCATHeter PRN    sodium chloride (NS) flush 10-40 mL  10-40 mL IntraVENous PRN    heparin (porcine) pf 500 Units  500 Units InterCATHeter PRN         Review of Systems - Negative except as in interval history       Objective:     Visit Vitals  /78   Pulse 88   Temp 98.9 °F (37.2 °C)   SpO2 100%       Temp (24hrs), Av.9 °F (37.2 °C), Min:98.9 °F (37.2 °C), Max:98.9 °F (37.2 °C)    GEN: 37 y/o AAM ambulating with scooter  HEENT: no scleral icterus  CHEST: no crackles or wheezes  CVS:regular no murmurs  ABD: soft, nontender  EXT: right foot wound almost completely healed  (see photo)          Lab results     Chemistry  No results for input(s): GLU, NA, K, CL, CO2, BUN, CREA, CA, AGAP, BUCR, TBIL, GPT, AP, TP, ALB, GLOB, AGRAT in the last 72 hours. CBC w/ Diff  No results for input(s): WBC, RBC, HGB, HCT, PLT, GRANS, LYMPH, EOS, HGBEXT, HCTEXT, PLTEXT in the last 72 hours.     Microbiology  All Micro Results     None             Elizabeth Davis MD, [facility]  Infectious Disease Specialist  Pager [phone]

## 2022-06-22 NOTE — PROGRESS NOTES
Internal Medicine Progress Note        NAME: Leanna Pham   :  1975  MRM:  [id number]    Date/Time: 2019        ASSESSMENT/PLAN:       #   Osteomyelitis of right foot ([facility] (2019). MRI positive for  OM. Possible the source of the infection. ID and podiatry consulted. As above. Pt had surgery by Dr. Carolee Pan on  (right 5th metatarsal head resection). Pathology of bone biopsy shows osteomyelitis. D/w Dr. Whitley Brunson who said pt would need 6 weeks of IV rocephin. Pt electing to go to SNF, CM working on placement. PICC consult placed.        #  Sepsis ([facility] (2019) , likely from foot infection. Blood and ulcer CS. ID consulted, cont vanc and zosyn. Tele bed. Maintain vital signs. IVF. Lactic acid. Presented with hypotension and tachycardia  - blood C/S , one bottle positive for GPC, anaerobic positive for  BHS      # Dehydration. IVF. Strict I/O      #   Right foot ulcer, with necrosis of muscle ([facility] (10/18/2018). Podiatrist consulted and performed right foot 5th metatarsal head resection and bone biopsies       #   Left arm weakness (2019) from previous injury in his neck. Stable.      # DM . Provide SSI, hypoglycemia protocol and frequent Accu checks.      # Leukopenia. trend     -DVT prophylaxis :  heparin.   - Code Status : FULL      Lab Review:     Recent Labs     19  0438   WBC 4.9   HGB 9.5*   HCT 31.0*        Recent Labs     19  0438      K 4.0      CO2 30   *   BUN 16   CREA 1.17   CA 8.2*   MG 2.3   PHOS 4.0     Lab Results   Component Value Date/Time    Glucose (POC) 215 (H) 2019 08:04 AM    Glucose (POC) 230 (H) 2019 10:39 PM    Glucose (POC) 270 (H) 2019 04:57 PM    Glucose (POC) 183 (H) 2019 11:39 AM    Glucose (POC) 229 (H) 2019 08:05 AM    Glucose, POC 75 2019 12:25 PM     No results for input(s): PH, PCO2, PO2, HCO3, FIO2 in the last 72 hours.   No results for input(s): INR in the last 72 Raul carlson - rx set up    hours.    No lab exists for component: INREXT, INREXT    No results found for: SDES  Lab Results   Component Value Date/Time    Culture result: NO GROWTH 3 DAYS 06/02/2019 12:06 PM    Culture result: NO ANAEROBES ISOLATED 3 DAYS 06/02/2019 12:06 PM    Culture result: NO GROWTH 6 DAYS 05/29/2019 12:25 PM           Subjective:     Chief Complaint:      Pt eating breakfast.  Denies complaints  Understand he will need long term IV AB   Wants to go to SNF for IVAB         Objective:     Vitals:  Last 24hrs VS reviewed since prior progress note. Most recent are:    Visit Vitals  /60   Pulse 82   Temp 98.4 °F (36.9 °C)   Resp 16   Ht 5' 6\" (1.676 m)   Wt 65.9 kg (145 lb 4.8 oz)   SpO2 99%   BMI 23.45 kg/m²     SpO2 Readings from Last 6 Encounters:   06/05/19 99%   11/15/18 98%   11/01/18 100%   10/18/18 100%   02/23/13 99%            Intake/Output Summary (Last 24 hours) at 6/5/2019 1201  Last data filed at 6/5/2019 1119  Gross per 24 hour   Intake 2450 ml   Output 3126 ml   Net -676 ml          Physical Exam:   Gen:  Appear stated age, Well-developed, well-nourished, in no acute distress  HEENT:  Head atraumatic, normocephalic , hearing intact to voice, moist mucous membranes. Neck:  Supple, no masses I appreciate, thyroid non-tender. Resp:  No accessory muscle use,Bilateral BS present, clear breath sounds without wheezes rales or rhonchi  Card:  No murmurs, normal S1, S2 without thrills, bruits or peripheral edema. Abd:  Soft, non-tender, non-distended, normoactive bowel sounds are present, no palpable organomegaly    Musc:  No cyanosis or clubbing. Skin: right foot wrapped in ACE, skin turgor is good. Neuro:  Cranial nerves are grossly intact,  follows commands appropriately. alert.      Medications Reviewed: (see below)    Lab Data Reviewed: (see below)    ______________________________________________________________________    Medications:     Current Facility-Administered Medications   Medication Dose Route Frequency    insulin lispro (HUMALOG) injection 3 Units  3 Units SubCUTAneous TIDPC    insulin lispro (HUMALOG) injection   SubCUTAneous QID AFTER MEALS    insulin glargine (LANTUS) injection 9 Units  9 Units SubCUTAneous BID    cefTRIAXone (ROCEPHIN) 2 g in 0.9% sodium chloride (MBP/ADV) 50 mL MBP  2 g IntraVENous Q24H    acetaminophen (TYLENOL) tablet 650 mg  650 mg Oral Q6H PRN    melatonin tablet 3 mg  3 mg Oral QHS PRN    dextrose 10 % infusion 125-250 mL  125-250 mL IntraVENous PRN    sodium chloride (NS) flush 5-10 mL  5-10 mL IntraVENous PRN    aspirin chewable tablet 81 mg  81 mg Oral DAILY    calcium-vitamin D 600 mg(1,500mg) -200 unit per tablet 1 Tab  1 Tab Oral DAILY    gabapentin (NEURONTIN) capsule 300 mg  300 mg Oral TID    simvastatin (ZOCOR) tablet 40 mg  40 mg Oral QHS    0.45% sodium chloride infusion  75 mL/hr IntraVENous CONTINUOUS    heparin (porcine) injection 5,000 Units  5,000 Units SubCUTAneous Q8H    glucose chewable tablet 16 g  4 Tab Oral PRN    glucagon (GLUCAGEN) injection 1 mg  1 mg IntraMUSCular PRN          Total time spent with patient: 25 minutes                  Care Plan discussed with: Patient and Nursing Staff    Discussed:  Care Plan    Prophylaxis:  Hep SQ    Disposition:  Home w/Family             Attending Physician: Brenda Simon MD

## 2023-06-05 NOTE — PROGRESS NOTES
NUTRITION follow-up/Plan of care    RECOMMENDATIONS:     1. Diabetic Consistent Carb diet (2000 Kcal)  2. HS Snack  3. Monitor weight, labs and PO intake  4. RD to follow    GOALS:     1. Met/Ongoing: PO intake meets >75% of protein/calorie needs by 6/11  2. Ongoing: Maintain weight (+/- 1-2 lb) by 6/11    ASSESSMENT:      Weight status is classified as normal per BMI of 23.5. PO intake is adequate. Labs noted. BG range from 183-398 over past 24 hours. Being followed by glycemic control team. Nutrition recommendations listed. RD to follow. Nutrition Risk:  []   High []  Moderate [x] Low    SUBJECTIVE/OBJECTIVE:     Admitted with right foot ulcer. Has history of diabetes. S/P right 5th metatarsal head and base of phalanx resection on 6/2. Patient reports having a good appetite and is eating all of meals. 100% intake of lunch meal today and 100% of all meals per vitals. Noted 9083-1631 Kcal diet ordered. Will adjust to 2000 Kcal to meet nutrition needs. States weight has been stable PTA and UBW is between 145-150 lb. No complaints. Will monitor. Information Obtained From:   [x] Chart Review  [x] Patient  [] Family/Caregiver  [] Nurse/Physician   [] Patient Rounds/Interdisciplinary Meeting    Diet: Diabetic Consistent Carb 4791-8000 Kcal  Patient Vitals for the past 100 hrs:   % Diet Eaten   06/04/19 0907 100 %   06/03/19 1816 100 %   06/03/19 1316 100 %   06/03/19 0928 100 %   06/03/19 0913 100 %   06/02/19 1813 100 %     Medications: [x] Reviewed   Encounter Diagnoses     ICD-10-CM ICD-9-CM   1.  Right foot ulcer, with necrosis of muscle ([facility] L97.513 707.15     Past Medical History:   Diagnosis Date    Diabetes ([facility]      Labs:    Lab Results   Component Value Date/Time    Sodium 139 06/03/2019 04:38 AM    Potassium 4.0 06/03/2019 04:38 AM    Chloride 102 06/03/2019 04:38 AM    CO2 30 06/03/2019 04:38 AM    Anion gap 7 06/03/2019 04:38 AM    Glucose 167 (H) 06/03/2019 04:38 AM    BUN 16 06/03/2019 04:38 Pt calling, stated that Rhianna directions for the Victoza med is to use 1.2 mg, but pt thinks that she is using 1.8 mg instead with each injection.  Therefore she is running out of med early.  Please call pt for clarification.  [phone]         AM    Creatinine 1.17 06/03/2019 04:38 AM    Calcium 8.2 (L) 06/03/2019 04:38 AM    Magnesium 2.3 06/03/2019 04:38 AM    Phosphorus 4.0 06/03/2019 04:38 AM    Albumin 3.0 (L) 05/30/2019 05:30 AM     Anthropometrics: BMI (calculated): 23.5   Last 3 Recorded Weights in this Encounter    05/29/19 1206 06/03/19 1737   Weight: 68 kg (150 lb) 65.9 kg (145 lb 4.8 oz)      Ht Readings from Last 1 Encounters:   05/29/19 5' 6\" (1.676 m)     Weight Metrics 6/3/2019 2/22/2013   Weight 145 lb 4.8 oz 150 lb   BMI 23.45 kg/m2 23.49 kg/m2      [x]  Weight Loss  []  Weight Gain  []  Weight Stable   []  New wt n/a on record     Estimated Nutrition Needs:   2040 Kcals/day  Protein (g): 80 g    Nutrition Problems Identified:   [] Suboptimal PO intake   [] Food Allergies  [] Difficulty chewing/swallowing/poor dentition  [] Constipation/Diarrhea   [] Nausea/Vomiting   [] None  [x] Other: Wound healing    Plan:   [x] Therapeutic Diet  []  Obtained/adjusted food preferences/tolerances and/or snacks options   []  Supplements added   [] Occupational therapy following for feeding techniques  [x]  HS snack  []  Modify diet texture   []  Modify diet for food allergies   []  Assist with menu selection   [x]  Monitor PO intake on meal rounds   [x]  Continue inpatient monitoring and intervention   []  Participated in discharge planning/Interdisciplinary rounds/Team meetings   []  Other:     Education Needs:   [] Not appropriate for teaching at this time due to:   [x] Identified and addressed    Nutrition Monitoring and Evaluation:   [x] Continue inpatient monitoring and interventions    [] Other:     [address]

## (undated) DEVICE — BNDG CMPR ELAS KNT VEL STD 4IN -- MEDICHOICE

## (undated) DEVICE — HANDPIECE SET WITH SOFT TISSUE TIP AND SUCTION TUBE: Brand: INTERPULSE

## (undated) DEVICE — NEEDLE HYPO 25GA L1.5IN BLU POLYPR HUB S STL REG BVL STR

## (undated) DEVICE — SUT ETHBND 2-0 30IN SH GRN --

## (undated) DEVICE — SUTURE ETHLN SZ 2-0 L18IN NONABSORBABLE BLK L26MM PS 3/8 585H

## (undated) DEVICE — REM POLYHESIVE ADULT PATIENT RETURN ELECTRODE: Brand: VALLEYLAB

## (undated) DEVICE — 12FR FRAZIER SUCTION HANDLE: Brand: CARDINAL HEALTH

## (undated) DEVICE — KERLIX BANDAGE ROLL: Brand: KERLIX

## (undated) DEVICE — Device

## (undated) DEVICE — BANDAGE COMPR W4INXL5YD BGE COHESIVE SELF ADH ADBAN CBN1104] AVCOR HEALTHCARE PRODUCTS INC]

## (undated) DEVICE — INTENDED FOR TISSUE SEPARATION, AND OTHER PROCEDURES THAT REQUIRE A SHARP SURGICAL BLADE TO PUNCTURE OR CUT.: Brand: BARD-PARKER ® CARBON RIB-BACK BLADES

## (undated) DEVICE — PREP SKN CHLRAPRP 26ML TNT -- CONVERT TO ITEM 373320

## (undated) DEVICE — X-RAY DETECTABLE SPONGES,12 PLY: Brand: VISTEC

## (undated) DEVICE — 10FR FRAZIER SUCTION HANDLE: Brand: CARDINAL HEALTH

## (undated) DEVICE — BLADE SAW OSC COARSE 25X9MM --

## (undated) DEVICE — 10 FRENCH DRAIN SYSTEM, STERILE: Brand: TLS

## (undated) DEVICE — STOCKINETTE IMPERV REG 9X48IN --

## (undated) DEVICE — OCCLUSIVE GAUZE STRIP,3% BISMUTH TRIBROMOPHENATE IN PETROLATUM BLEND: Brand: XEROFORM

## (undated) DEVICE — SYRINGE BLB 50CC IRRIG PLIABLE FNGR FLNG GRAD FLSK DISP

## (undated) DEVICE — ROCKER SWITCH PENCIL HOLSTER: Brand: VALLEYLAB

## (undated) DEVICE — STERILE POLYISOPRENE POWDER-FREE SURGICAL GLOVES: Brand: PROTEXIS

## (undated) DEVICE — SOLUTION IV 1000ML 0.9% SOD CHL

## (undated) DEVICE — DRAPE,EXTREMITY,89X128,STERILE: Brand: MEDLINE

## (undated) DEVICE — BANDAGE COMPR 9 FTX4 IN SMOOTH COMFORTABLE SYNTH ESMRK LF

## (undated) DEVICE — KIT PROC EXTRM HND FT CUST LF --

## (undated) DEVICE — TABLE COVER: Brand: CONVERTORS

## (undated) DEVICE — CONVERTORS STOCKINETTE: Brand: CONVERTORS

## (undated) DEVICE — SUTURE S STL SZ 2-0 L18IN NONABSORBABLE TIE MFIL 600 PER BX DS28

## (undated) DEVICE — DISPOSABLE TOURNIQUET CUFF SINGLE BLADDER, SINGLE PORT AND QUICK CONNECT CONNECTOR: Brand: COLOR CUFF

## (undated) DEVICE — DRAPE,U/ SHT,SPLIT,PLAS,STERIL: Brand: MEDLINE